# Patient Record
Sex: MALE | Race: WHITE | NOT HISPANIC OR LATINO | Employment: OTHER | URBAN - METROPOLITAN AREA
[De-identification: names, ages, dates, MRNs, and addresses within clinical notes are randomized per-mention and may not be internally consistent; named-entity substitution may affect disease eponyms.]

---

## 2017-03-12 DIAGNOSIS — J20.9 BRONCHITIS WITH BRONCHOSPASM: ICD-10-CM

## 2017-03-12 DIAGNOSIS — J44.9 CHRONIC OBSTRUCTIVE PULMONARY DISEASE, UNSPECIFIED COPD TYPE: ICD-10-CM

## 2017-03-13 RX ORDER — FLUTICASONE PROPIONATE 50 MCG
SPRAY, SUSPENSION (ML) NASAL
Refills: 0 | OUTPATIENT
Start: 2017-03-13

## 2017-12-11 ENCOUNTER — HOSPITAL ENCOUNTER (INPATIENT)
Facility: HOSPITAL | Age: 67
LOS: 6 days | Discharge: HOME | DRG: 189 | End: 2017-12-17
Attending: INTERNAL MEDICINE | Admitting: INTERNAL MEDICINE
Payer: MEDICARE

## 2017-12-11 DIAGNOSIS — J96.01 ACUTE HYPOXEMIC RESPIRATORY FAILURE: Primary | ICD-10-CM

## 2017-12-11 DIAGNOSIS — J44.1 COPD EXACERBATION: ICD-10-CM

## 2017-12-11 DIAGNOSIS — R06.00 DYSPNEA: ICD-10-CM

## 2017-12-11 PROBLEM — Z87.891 HISTORY OF PRIOR CIGARETTE SMOKING: Status: ACTIVE | Noted: 2017-12-11

## 2017-12-11 PROBLEM — Z79.52 CURRENT CHRONIC USE OF SYSTEMIC STEROIDS: Status: ACTIVE | Noted: 2017-12-11

## 2017-12-11 LAB
BILIRUB UR QL STRIP: NEGATIVE
CLARITY UR: CLEAR
COLOR UR: YELLOW
FLUAV AG SPEC QL IA: NEGATIVE
FLUBV AG SPEC QL IA: NEGATIVE
GLUCOSE UR QL STRIP: ABNORMAL
HGB UR QL STRIP: NEGATIVE
KETONES UR QL STRIP: NEGATIVE
LEUKOCYTE ESTERASE UR QL STRIP: NEGATIVE
NITRITE UR QL STRIP: NEGATIVE
PH UR STRIP: 8 [PH] (ref 5–8)
PROT UR QL STRIP: ABNORMAL
SP GR UR STRIP: <=1.005 (ref 1–1.03)
SPECIMEN SOURCE: NORMAL
URN SPEC COLLECT METH UR: ABNORMAL
UROBILINOGEN UR STRIP-ACNC: NEGATIVE EU/DL

## 2017-12-11 PROCEDURE — 94761 N-INVAS EAR/PLS OXIMETRY MLT: CPT

## 2017-12-11 PROCEDURE — 12000002 HC ACUTE/MED SURGE SEMI-PRIVATE ROOM

## 2017-12-11 PROCEDURE — 63600175 PHARM REV CODE 636 W HCPCS: Performed by: INTERNAL MEDICINE

## 2017-12-11 PROCEDURE — 36415 COLL VENOUS BLD VENIPUNCTURE: CPT

## 2017-12-11 PROCEDURE — 93005 ELECTROCARDIOGRAM TRACING: CPT

## 2017-12-11 PROCEDURE — 87040 BLOOD CULTURE FOR BACTERIA: CPT | Mod: 59

## 2017-12-11 PROCEDURE — 27000221 HC OXYGEN, UP TO 24 HOURS

## 2017-12-11 PROCEDURE — 25000003 PHARM REV CODE 250: Performed by: INTERNAL MEDICINE

## 2017-12-11 PROCEDURE — 99900035 HC TECH TIME PER 15 MIN (STAT)

## 2017-12-11 PROCEDURE — 99900026 HC AIRWAY MAINTENANCE (STAT)

## 2017-12-11 PROCEDURE — 81003 URINALYSIS AUTO W/O SCOPE: CPT

## 2017-12-11 PROCEDURE — 99223 1ST HOSP IP/OBS HIGH 75: CPT | Mod: AI,,, | Performed by: INTERNAL MEDICINE

## 2017-12-11 PROCEDURE — 87400 INFLUENZA A/B EACH AG IA: CPT | Mod: 59

## 2017-12-11 RX ORDER — IBUPROFEN 200 MG
200 CAPSULE ORAL DAILY
Status: DISCONTINUED | OUTPATIENT
Start: 2017-12-12 | End: 2017-12-12

## 2017-12-11 RX ORDER — IBUPROFEN 200 MG
16 TABLET ORAL
Status: DISCONTINUED | OUTPATIENT
Start: 2017-12-11 | End: 2017-12-17 | Stop reason: HOSPADM

## 2017-12-11 RX ORDER — IBUPROFEN 200 MG
24 TABLET ORAL
Status: DISCONTINUED | OUTPATIENT
Start: 2017-12-11 | End: 2017-12-17 | Stop reason: HOSPADM

## 2017-12-11 RX ORDER — ACETAMINOPHEN 650 MG/1
650 SUPPOSITORY RECTAL EVERY 4 HOURS PRN
Status: DISCONTINUED | OUTPATIENT
Start: 2017-12-11 | End: 2017-12-17 | Stop reason: HOSPADM

## 2017-12-11 RX ORDER — IPRATROPIUM BROMIDE AND ALBUTEROL SULFATE 2.5; .5 MG/3ML; MG/3ML
3 SOLUTION RESPIRATORY (INHALATION) EVERY 6 HOURS PRN
Status: DISCONTINUED | OUTPATIENT
Start: 2017-12-11 | End: 2017-12-14

## 2017-12-11 RX ORDER — NAPROXEN SODIUM 220 MG/1
81 TABLET, FILM COATED ORAL DAILY
Status: DISCONTINUED | OUTPATIENT
Start: 2017-12-12 | End: 2017-12-17 | Stop reason: HOSPADM

## 2017-12-11 RX ORDER — METHYLPREDNISOLONE SOD SUCC 125 MG
62.5 VIAL (EA) INJECTION EVERY 8 HOURS
Status: DISCONTINUED | OUTPATIENT
Start: 2017-12-11 | End: 2017-12-13

## 2017-12-11 RX ORDER — ROFLUMILAST 500 UG/1
500 TABLET ORAL DAILY
Status: DISCONTINUED | OUTPATIENT
Start: 2017-12-12 | End: 2017-12-12

## 2017-12-11 RX ORDER — IPRATROPIUM BROMIDE AND ALBUTEROL SULFATE 2.5; .5 MG/3ML; MG/3ML
3 SOLUTION RESPIRATORY (INHALATION) EVERY 6 HOURS PRN
Status: DISCONTINUED | OUTPATIENT
Start: 2017-12-11 | End: 2017-12-11 | Stop reason: SDUPTHER

## 2017-12-11 RX ORDER — FLUTICASONE PROPIONATE 50 MCG
1 SPRAY, SUSPENSION (ML) NASAL DAILY
Status: DISCONTINUED | OUTPATIENT
Start: 2017-12-12 | End: 2017-12-17 | Stop reason: HOSPADM

## 2017-12-11 RX ORDER — GLUCAGON 1 MG
1 KIT INJECTION
Status: DISCONTINUED | OUTPATIENT
Start: 2017-12-11 | End: 2017-12-17 | Stop reason: HOSPADM

## 2017-12-11 RX ORDER — ENOXAPARIN SODIUM 100 MG/ML
40 INJECTION SUBCUTANEOUS EVERY 24 HOURS
Status: DISCONTINUED | OUTPATIENT
Start: 2017-12-11 | End: 2017-12-17 | Stop reason: HOSPADM

## 2017-12-11 RX ORDER — SODIUM CHLORIDE 0.9 % (FLUSH) 0.9 %
5 SYRINGE (ML) INJECTION
Status: DISCONTINUED | OUTPATIENT
Start: 2017-12-11 | End: 2017-12-17 | Stop reason: HOSPADM

## 2017-12-11 RX ORDER — CODEINE PHOSPHATE AND GUAIFENESIN 10; 100 MG/5ML; MG/5ML
10 SOLUTION ORAL EVERY 6 HOURS PRN
Status: DISCONTINUED | OUTPATIENT
Start: 2017-12-11 | End: 2017-12-11 | Stop reason: CLARIF

## 2017-12-11 RX ORDER — FLUTICASONE FUROATE AND VILANTEROL 100; 25 UG/1; UG/1
1 POWDER RESPIRATORY (INHALATION) DAILY
Status: DISCONTINUED | OUTPATIENT
Start: 2017-12-12 | End: 2017-12-17 | Stop reason: HOSPADM

## 2017-12-11 RX ORDER — HYDROCODONE POLISTIREX AND CHLORPHENIRAMINE POLISTIREX 10; 8 MG/5ML; MG/5ML
5 SUSPENSION, EXTENDED RELEASE ORAL EVERY 12 HOURS PRN
Status: DISCONTINUED | OUTPATIENT
Start: 2017-12-11 | End: 2017-12-17 | Stop reason: HOSPADM

## 2017-12-11 RX ORDER — ACETAMINOPHEN 325 MG/1
650 TABLET ORAL EVERY 6 HOURS PRN
Status: DISCONTINUED | OUTPATIENT
Start: 2017-12-11 | End: 2017-12-17 | Stop reason: HOSPADM

## 2017-12-11 RX ADMIN — METHYLPREDNISOLONE SODIUM SUCCINATE 62.5 MG: 125 INJECTION, POWDER, FOR SOLUTION INTRAMUSCULAR; INTRAVENOUS at 04:12

## 2017-12-11 RX ADMIN — CEFTRIAXONE 1 G: 1 INJECTION, POWDER, FOR SOLUTION INTRAMUSCULAR; INTRAVENOUS at 04:12

## 2017-12-11 RX ADMIN — ENOXAPARIN SODIUM 40 MG: 100 INJECTION SUBCUTANEOUS at 04:12

## 2017-12-11 RX ADMIN — METHYLPREDNISOLONE SODIUM SUCCINATE 62.5 MG: 125 INJECTION, POWDER, FOR SOLUTION INTRAMUSCULAR; INTRAVENOUS at 11:12

## 2017-12-11 RX ADMIN — AZITHROMYCIN MONOHYDRATE 500 MG: 500 INJECTION, POWDER, LYOPHILIZED, FOR SOLUTION INTRAVENOUS at 05:12

## 2017-12-11 NOTE — NURSING
Patient arrived from EMS from [facility]. Patient on 3LO2 via NC, primary nurse, Kelsey at the bedside performing head to toe assessment, patient appears aaox3 and piv intact in rue. Yellow socks applied and will continue with assessment.

## 2017-12-11 NOTE — H&P
"PCP: Jory Glez MD    History & Physical    Chief Complaint: Worsening SOB    History of Present Illness:  Patient is a 67 y.o. male admitted to Hospitalist Service from [facility] Emergency Room with complaint of worsening SOB. Patient reportedly has past medical history significant for COPD and prior history of smoking. Patient reported progressive worsening in SOB for few days. Symptoms started with "cold" symptoms. Patient has been using breathing inhalers many times and nebulizer without much improvement. No sick contact or travel history. Patient denied chest pain, abdominal pain, nausea, vomiting, headache, vision changes, focal neuro-deficits, cough or fever.     Past Medical History:   Diagnosis Date    Bronchitis     COPD (chronic obstructive pulmonary disease)      Past Surgical History:   Procedure Laterality Date    HERNIA REPAIR      WRIST SURGERY       Family History   Problem Relation Age of Onset    Cancer Mother     Asthma Mother     Heart disease Father     COPD Father     Cancer Father     Cancer Brother      colon cancer    No Known Problems Brother     No Known Problems Brother      Social History   Substance Use Topics    Smoking status: Former Smoker     Packs/day: 2.00     Years: 40.00     Quit date: 3/24/2009    Smokeless tobacco: Never Used    Alcohol use Yes      Comment: 6-12 beers/night; some days none.      Review of patient's allergies indicates:  No Known Allergies  PTA Medications   Medication Sig    albuterol (ACCUNEB) 1.25 mg/3 mL Nebu Take 1.25 mg by nebulization every 6 (six) hours as needed. Rescue    ALBUTEROL SULFATE (VENTOLIN INHL) Inhale into the lungs.    aspirin 81 MG Chew Take 1 tablet (81 mg total) by mouth once daily.    calcium citrate (CALCITRATE) 200 mg (950 mg) tablet Take 1 tablet by mouth once daily.    fluticasone (FLONASE) 50 mcg/actuation nasal spray 1 spray by Each Nare route once daily.    fluticasone-vilanterol (BREO " ELLIPTA) 100-25 mcg/dose diskus inhaler Inhale 1 puff into the lungs once daily.    LEVALBUTEROL HCL (XOPENEX INHL) Inhale into the lungs.    predniSONE (DELTASONE) 5 MG tablet     roflumilast (DALIRESP) 500 mcg Tab Take 1 tablet (500 mcg total) by mouth once daily.    guaifenesin-codeine 100-10 mg/5 ml (TUSSI-ORGANIDIN NR)  mg/5 mL syrup Take 10 mLs by mouth every 6 (six) hours as needed for Cough.     Review of Systems:  Constitutional: no fever or chills  Eyes: no visual changes  Ears, nose, mouth, throat, and face: no nasal congestion or sore throat  Respiratory: see HPI  Cardiovascular: no chest pain or palpitations  Gastrointestinal: No N/V/D or abdominal pain  Genitourinary: no hematuria or dysuria  Integument/breast: no rash or pruritis  Hematologic/lymphatic: no easy bruising or lymphadenopathy  Musculoskeletal: no arthralgias or myalgias  Neurological: no seizures or tremors.  Behavioral/Psych: no auditory or visual hallucinations  Endocrine: no heat or cold intolerance     OBJECTIVE:     Vital Signs (Most Recent)  Temp: 97 °F (36.1 °C) (12/11/17 1414)  Pulse: 98 (12/11/17 1414)  Resp: 20 (12/11/17 1414)  BP: (!) 154/76 (12/11/17 1414)  SpO2: 96 % (12/11/17 1414)    Physical Exam:  General appearance: well developed, appears stated age  Head: normocephalic, atraumatic  Eyes:  conjunctivae/corneas clear. PERRL.  Nose: Nares normal. Septum midline.  Throat: lips, mucosa, and tongue normal; teeth and gums normal, no throat erythema.  Neck: supple, symmetrical, trachea midline, no JVD and thyroid not enlarged, symmetric, no tenderness/mass/nodules  Lungs:  Bilateral scattered rhonchi with prolonged expirations.  Chest wall: no tenderness  Heart: regular rate and rhythm, S1, S2 normal, no murmur, click, rub or gallop  Abdomen: soft, non-tender non-distented; bowel sounds normal; no masses,  no organomegaly  Extremities: no cyanosis, clubbing or edema.   Pulses: 2+ and symmetric  Skin: Skin color,  texture, turgor normal. No rashes or lesions.  Lymph nodes: Cervical, supraclavicular, and axillary nodes normal.  Neurologic: Normal strength and tone. No focal numbness or weakness. CNII-XII intact.      Laboratory:   CBC:   Recent Labs  Lab 12/11/17 0514 12/11/17 0710   WBC 10.30  --    RBC 4.66  --    HGB 14.5  --    HCT 44.0 36     --    MCV 94  --    MCH 31.1*  --    MCHC 33.0  --      CMP:   Recent Labs  Lab 12/11/17 0514   *   CALCIUM 8.5*   ALBUMIN 4.1   PROT 7.3      K 3.4*   CO2 28*   *   BUN 11   CREATININE 0.8   ALKPHOS 59   ALT 23   AST 26   BILITOT 0.8     Coagulation:   Recent Labs  Lab 12/11/17 0514   LABPROT 10.2   INR 1.0     Cardiac markers:   Recent Labs  Lab 12/11/17 0514   TROPONINI 0.01     Hemoglobin A1C   Date Value Ref Range Status   08/27/2015 5.6 4.5 - 6.2 % Final     Microbiology Results (last 7 days)     ** No results found for the last 168 hours. **        Diagnostic Results:  Chest X-Ray: No   abnormality identified.    CTA chest:   1. No CT evidence for central or apparent peripheral pulmonary thromboembolus.   2. No aortic dissection or aneurysm identified.     3.  Centrilobular emphysema with bronchial wall thickening.  Assessment/Plan:     Active Hospital Problems    Diagnosis  POA    *Acute hypoxemic respiratory failure [J96.01]  Yes    COPD exacerbation [J44.1]  Yes    History of prior cigarette smoking [Z87.891]  Not Applicable    Current chronic use of systemic steroids [Z79.52]  Supplemental O2 via nasal canula; titrate O2 saturation to >92%.   Start Solumedrol 62.5 mg IV q 8 hrs.   DC PO Prednisone.   Continue beta 2 agonist bronchodilator treatments.   Continue IV antibiotics - Azithromycin and Ceftriaxone.  Continue Daliresp.  Check sputum GS and Cx.   Continue routine medications as before.   Check Influenza rapid screen.  Not Applicable        VTE Risk Mitigation         Ordered     enoxaparin injection 40 mg  Daily     Route:   Subcutaneous        12/11/17 1451     Medium Risk of VTE  Once      12/11/17 1451        Sharon Costello MD  Department of Hospital Medicine   [facility]

## 2017-12-12 LAB
ANION GAP SERPL CALC-SCNC: 10 MMOL/L
BASOPHILS # BLD AUTO: 0.1 K/UL
BASOPHILS NFR BLD: 0.4 %
BUN SERPL-MCNC: 14 MG/DL
CALCIUM SERPL-MCNC: 9.1 MG/DL
CHLORIDE SERPL-SCNC: 106 MMOL/L
CO2 SERPL-SCNC: 26 MMOL/L
CREAT SERPL-MCNC: 0.8 MG/DL
DIFFERENTIAL METHOD: ABNORMAL
EOSINOPHIL # BLD AUTO: 0 K/UL
EOSINOPHIL NFR BLD: 0 %
ERYTHROCYTE [DISTWIDTH] IN BLOOD BY AUTOMATED COUNT: 12.4 %
EST. GFR  (AFRICAN AMERICAN): >60 ML/MIN/1.73 M^2
EST. GFR  (NON AFRICAN AMERICAN): >60 ML/MIN/1.73 M^2
GLUCOSE SERPL-MCNC: 149 MG/DL
HCT VFR BLD AUTO: 40.6 %
HGB BLD-MCNC: 13.8 G/DL
LYMPHOCYTES # BLD AUTO: 0.8 K/UL
LYMPHOCYTES NFR BLD: 5.2 %
MCH RBC QN AUTO: 32 PG
MCHC RBC AUTO-ENTMCNC: 34.1 G/DL
MCV RBC AUTO: 94 FL
MONOCYTES # BLD AUTO: 0.3 K/UL
MONOCYTES NFR BLD: 1.9 %
NEUTROPHILS # BLD AUTO: 13.8 K/UL
NEUTROPHILS NFR BLD: 92.5 %
PLATELET # BLD AUTO: 292 K/UL
PMV BLD AUTO: 8.5 FL
POTASSIUM SERPL-SCNC: 4.7 MMOL/L
RBC # BLD AUTO: 4.33 M/UL
SODIUM SERPL-SCNC: 142 MMOL/L
WBC # BLD AUTO: 14.9 K/UL

## 2017-12-12 PROCEDURE — 25000003 PHARM REV CODE 250: Performed by: INTERNAL MEDICINE

## 2017-12-12 PROCEDURE — 27000221 HC OXYGEN, UP TO 24 HOURS

## 2017-12-12 PROCEDURE — 80048 BASIC METABOLIC PNL TOTAL CA: CPT

## 2017-12-12 PROCEDURE — 94640 AIRWAY INHALATION TREATMENT: CPT

## 2017-12-12 PROCEDURE — 63600175 PHARM REV CODE 636 W HCPCS: Performed by: INTERNAL MEDICINE

## 2017-12-12 PROCEDURE — 87070 CULTURE OTHR SPECIMN AEROBIC: CPT

## 2017-12-12 PROCEDURE — 36415 COLL VENOUS BLD VENIPUNCTURE: CPT

## 2017-12-12 PROCEDURE — 85025 COMPLETE CBC W/AUTO DIFF WBC: CPT

## 2017-12-12 PROCEDURE — 99232 SBSQ HOSP IP/OBS MODERATE 35: CPT | Mod: ,,, | Performed by: INTERNAL MEDICINE

## 2017-12-12 PROCEDURE — 87205 SMEAR GRAM STAIN: CPT

## 2017-12-12 PROCEDURE — 99900035 HC TECH TIME PER 15 MIN (STAT)

## 2017-12-12 PROCEDURE — 12000002 HC ACUTE/MED SURGE SEMI-PRIVATE ROOM

## 2017-12-12 PROCEDURE — 94761 N-INVAS EAR/PLS OXIMETRY MLT: CPT

## 2017-12-12 PROCEDURE — 25000242 PHARM REV CODE 250 ALT 637 W/ HCPCS: Performed by: INTERNAL MEDICINE

## 2017-12-12 RX ORDER — CALCIUM CARBONATE 500(1250)
500 TABLET ORAL ONCE
Status: COMPLETED | OUTPATIENT
Start: 2017-12-12 | End: 2017-12-12

## 2017-12-12 RX ADMIN — AZITHROMYCIN MONOHYDRATE 500 MG: 500 INJECTION, POWDER, LYOPHILIZED, FOR SOLUTION INTRAVENOUS at 06:12

## 2017-12-12 RX ADMIN — HYDROCODONE POLISTIREX AND CHLORPHENIRAMINE POLISITREX 5 ML: 10; 8 SUSPENSION, EXTENDED RELEASE ORAL at 06:12

## 2017-12-12 RX ADMIN — METHYLPREDNISOLONE SODIUM SUCCINATE 62.5 MG: 125 INJECTION, POWDER, FOR SOLUTION INTRAMUSCULAR; INTRAVENOUS at 02:12

## 2017-12-12 RX ADMIN — FLUTICASONE PROPIONATE 1 SPRAY: 50 SPRAY, METERED NASAL at 09:12

## 2017-12-12 RX ADMIN — IPRATROPIUM BROMIDE AND ALBUTEROL SULFATE 3 ML: .5; 3 SOLUTION RESPIRATORY (INHALATION) at 11:12

## 2017-12-12 RX ADMIN — METHYLPREDNISOLONE SODIUM SUCCINATE 62.5 MG: 125 INJECTION, POWDER, FOR SOLUTION INTRAMUSCULAR; INTRAVENOUS at 09:12

## 2017-12-12 RX ADMIN — FLUTICASONE FUROATE AND VILANTEROL TRIFENATATE 1 PUFF: 100; 25 POWDER RESPIRATORY (INHALATION) at 08:12

## 2017-12-12 RX ADMIN — METHYLPREDNISOLONE SODIUM SUCCINATE 62.5 MG: 125 INJECTION, POWDER, FOR SOLUTION INTRAMUSCULAR; INTRAVENOUS at 06:12

## 2017-12-12 RX ADMIN — ENOXAPARIN SODIUM 40 MG: 100 INJECTION SUBCUTANEOUS at 05:12

## 2017-12-12 RX ADMIN — Medication 500 MG: at 11:12

## 2017-12-12 RX ADMIN — ASPIRIN 81 MG CHEWABLE TABLET 81 MG: 81 TABLET CHEWABLE at 09:12

## 2017-12-12 RX ADMIN — CEFTRIAXONE 1 G: 1 INJECTION, POWDER, FOR SOLUTION INTRAMUSCULAR; INTRAVENOUS at 05:12

## 2017-12-12 NOTE — PROGRESS NOTES
"Progress Note  Hospital Medicine  Patient Name:Brad Nowak  MRN:  [id number]  Patient Class: IP- Inpatient  Admit Date: 12/11/2017  Length of Stay: 1 days  Expected Discharge Date:   Attending Physician: Sharon Costello MD  Primary Care Provider:  Jory Glez MD    SUBJECTIVE:     Principal Problem: Acute hypoxemic respiratory failure  Initial history of present illness: Patient is a 67 y.o. male admitted to Hospitalist Service from [facility] Emergency Room with complaint of worsening SOB. Patient reportedly has past medical history significant for COPD and prior history of smoking. Patient reported progressive worsening in SOB for few days. Symptoms started with "cold" symptoms. Patient has been using breathing inhalers many times and nebulizer without much improvement. No sick contact or travel history. Patient denied chest pain, abdominal pain, nausea, vomiting, headache, vision changes, focal neuro-deficits, cough or fever.     PMH/PSH/SH/FH/Meds: reviewed.    Symptoms/Review of Systems: Tmax 101F. Reports significant cough, wheezing and SOB. No chest pain or headache, fever or abdominal pain.     Diet:  Adequate intake.    Activity level: Normal.    Pain:  Patient reports no pain.       OBJECTIVE:   Vital Signs (Most Recent):      Temp: 96.3 °F (35.7 °C) (12/12/17 0800)  Pulse: 76 (12/12/17 0832)  Resp: 18 (12/12/17 0832)  BP: 133/85 (12/12/17 0800)  SpO2: 98 % (12/12/17 0832)       Vital Signs Range (Last 24H):  Temp:  [96.3 °F (35.7 °C)-101 °F (38.3 °C)]   Pulse:  []   Resp:  [16-20]   BP: (133-181)/(68-85)   SpO2:  [88 %-98 %]     Weight: 74 kg (163 lb 3.2 oz)  Body mass index is 25.56 kg/m².    Intake/Output Summary (Last 24 hours) at 12/12/17 0945  Last data filed at 12/12/17 0700   Gross per 24 hour   Intake              900 ml   Output             1050 ml   Net             -150 ml     Physical Examination:  General appearance: well developed, appears stated age  Head: normocephalic, " atraumatic  Eyes:  conjunctivae/corneas clear. PERRL.  Nose: Nares normal. Septum midline.  Throat: lips, mucosa, and tongue normal; teeth and gums normal, no throat erythema.  Neck: supple, symmetrical, trachea midline, no JVD and thyroid not enlarged, symmetric, no tenderness/mass/nodules  Lungs:  Bilateral scattered rhonchi with prolonged expirations.  Chest wall: no tenderness  Heart: regular rate and rhythm, S1, S2 normal, no murmur, click, rub or gallop  Abdomen: soft, non-tender non-distented; bowel sounds normal; no masses,  no organomegaly  Extremities: no cyanosis, clubbing or edema.   Pulses: 2+ and symmetric  Skin: Skin color, texture, turgor normal. No rashes or lesions.  Lymph nodes: Cervical, supraclavicular, and axillary nodes normal.  Neurologic: Normal strength and tone. No focal numbness or weakness. CNII-XII intact.    CBC:    Recent Labs  Lab 12/11/17  0514 12/11/17  0710 12/12/17  0551   WBC 10.30  --  14.90*   RBC 4.66  --  4.33*   HGB 14.5  --  13.8*   HCT 44.0 36 40.6     --  292   MCV 94  --  94   MCH 31.1*  --  32.0*   MCHC 33.0  --  34.1   BMP    Recent Labs  Lab 12/11/17  0514 12/12/17  0551   * 149*    142   K 3.4* 4.7   * 106   CO2 28* 26   BUN 11 14   CREATININE 0.8 0.8   CALCIUM 8.5* 9.1      Diagnostic Results:  Microbiology Results (last 7 days)     Procedure Component Value Units Date/Time    Culture, Respiratory with Gram Stain [[id number]] Collected:  12/12/17 0430    Order Status:  Sent Specimen:  Respiratory from Sputum Updated:  12/12/17 0937    Blood culture (site 1) [[id number]] Collected:  12/11/17 1542    Order Status:  Completed Specimen:  Blood from Antecubital, Left  Arm Updated:  12/12/17 0915     Blood Culture, Routine No Growth to date    Narrative:       Site # 1, aerobic and anaerobic    Blood culture (site 2) [[id number]] Collected:  12/11/17 1542    Order Status:  Completed Specimen:  Blood from Antecubital, Right  Arm Updated:  12/12/17  0915     Blood Culture, Routine No Growth to date    Narrative:       Site # 2, aerobic only         Chest X-Ray: No   abnormality identified.     CTA chest:   1. No CT evidence for central or apparent peripheral pulmonary thromboembolus.   2. No aortic dissection or aneurysm identified.     3.  Centrilobular emphysema with bronchial wall thickening.    Assessment/Plan:      *Acute hypoxemic respiratory failure [J96.01]   Yes    COPD exacerbation [J44.1]   Yes    History of prior cigarette smoking [Z87.891]   Not Applicable    Current chronic use of systemic steroids [Z79.52]  Supplemental O2 via nasal canula; titrate O2 saturation to >92%.   Continue Solumedrol 62.5 mg IV q 8 hrs.    Continue beta 2 agonist bronchodilator treatments.   Continue IV antibiotics - Azithromycin and Ceftriaxone.  Check sputum GS and Cx.   Continue routine medications as before.   Check Influenza rapid screen.    Hypokalemia - corrected  Follow BMP.   Not Applicable             VTE Risk Mitigation         Ordered     enoxaparin injection 40 mg  Daily     Route:  Subcutaneous        12/11/17 1451     Medium Risk of VTE  Once      12/11/17 1451        Sharon Costello MD  Department of Hospital Medicine   [facility]

## 2017-12-12 NOTE — PLAN OF CARE
Problem: Patient Care Overview  Goal: Plan of Care Review  Outcome: Ongoing (interventions implemented as appropriate)  Patient aao x 4. Denies pain. Dyspnea on exertion. Plan of care reviewed with patient, verbalized understanding. IV antibiotics given. Lovenox VTE prophylaxis. Remained free from fall and injury. Bed in lowest position and call light within reach. Vitals stable. Will continue to monitor.

## 2017-12-12 NOTE — PLAN OF CARE
12/12/17 1152   Patient Assessment/Suction   Level of Consciousness (AVPU) alert   Expansion/Accessory Muscles/Retractions abdominal muscle use   All Lung Fields Breath Sounds diminished   Rhythm/Pattern, Respiratory shortness of breath reported   PRE-TX-O2-ETCO2   O2 Device (Oxygen Therapy) nasal cannula   $ Is the patient on Low Flow Oxygen? Yes   SpO2 96 %   Pulse 94   Resp 16   Aerosol Therapy   $ Aerosol Therapy Charges Aerosol Treatment   Respiratory Treatment Status given   SVN/Inhaler Treatment Route mask   Position During Treatment HOB at 30 degrees   Patient Tolerance good   Post-Treatment   Post-treatment Heart Rate (beats/min) 94   Post-treatment Resp Rate (breaths/min) 18   All Fields Breath Sounds unchanged   prn tx given

## 2017-12-12 NOTE — PLAN OF CARE
Problem: Patient Care Overview  Goal: Plan of Care Review  Pt from [facility].  COPD exac.  Reports SOB on exertion or ambulation.  Oxygen in use and tolerating.  Urinating with out difficulty.  Flu neg.  UA sent as ordered.  Awaiting blood cultures.  Remained free from injury.  POC discussed and pt verbalized understanding.  Tele in use.  Call light in reach.  Denies c/o pain.

## 2017-12-12 NOTE — PLAN OF CARE
Patient lives alone, reports independence, still works full time, denies POA, living will or HH> next of kin is DaughterMaria C [facility]- [phone]. PCP is Dr. Glez, pharmacy is [facility] and patient plans to return home with no needs. Patient instructed on discharge planning folder ad left folder in room.        12/12/17 1238   Discharge Assessment   Assessment Type Discharge Planning Assessment   Confirmed/corrected address and phone number on facesheet? Yes   Assessment information obtained from? Patient   Communicated expected length of stay with patient/caregiver yes   Prior to hospitilization cognitive status: Alert/Oriented   Prior to hospitalization functional status: Independent   Current cognitive status: Alert/Oriented   Current Functional Status: Independent   Lives With alone   Able to Return to Prior Arrangements yes   Is patient able to care for self after discharge? Yes   Patient's perception of discharge disposition home or selfcare   Readmission Within The Last 30 Days no previous admission in last 30 days   Patient currently being followed by outpatient case management? No   Patient currently receives any other outside agency services? No   Equipment Currently Used at Home none   Do you have any problems affording any of your prescribed medications? No   Is the patient taking medications as prescribed? yes   Does the patient have transportation home? Yes   Transportation Available family or friend will provide   Does the patient receive services at the [facility]? No   Discharge Plan A Home   Patient/Family In Agreement With Plan yes   Does the patient have transportation to healthcare appointments? Yes

## 2017-12-12 NOTE — PLAN OF CARE
Problem: Patient Care Overview  Goal: Plan of Care Review  Outcome: Ongoing (interventions implemented as appropriate)  Pt AAOx4, PIV in place.  Tele monitored and maintained throughout shift.  I/O assessed and caluclated.  No c/o of pain during shift.  Explained need for respiratory culture, no productive sputum present at this time, will attempt to collect throughout remainder of shift.  VS stable.  Pt has remained free of injuries and falls throughout shift.  Environment free of clutter, side rails up x2, and call light within reach.  Pt has verbalized understanding of plan of care.

## 2017-12-12 NOTE — PLAN OF CARE
12/11/17 2055   Patient Assessment/Suction   Level of Consciousness (AVPU) alert   Respiratory Effort Normal;Unlabored   Expansion/Accessory Muscles/Retractions expansion symmetric;no retractions;no use of accessory muscles   All Lung Fields Breath Sounds clear;diminished   PRE-TX-O2-ETCO2   O2 Device (Oxygen Therapy) nasal cannula   Flow (L/min) 2   Oxygen Concentration (%) 28   SpO2 (!) 94 %   Pulse Oximetry Type Intermittent   Pulse 101   Resp 16   Aerosol Therapy   $ Aerosol Therapy Charges PRN treatment not required   Respiratory Treatment Status PRN treatment not required   Ready to Wean/Extubation Screen   FIO2<=50 (chart decimal) 0.28

## 2017-12-12 NOTE — PLAN OF CARE
12/12/17 0832   Patient Assessment/Suction   Level of Consciousness (AVPU) alert   PRE-TX-O2-ETCO2   O2 Device (Oxygen Therapy) nasal cannula   $ Is the patient on Low Flow Oxygen? Yes   Flow (L/min) 2   Oxygen Concentration (%) 28   SpO2 98 %   Pulse Oximetry Type Intermittent   $ Pulse Oximetry - Multiple Charge Pulse Oximetry - Multiple   Pulse 76   Resp 18   Inhaler   $ Inhaler Charges MDI (Metered Dose Inahler) Treatment;Mouth rinsed post treatment   Respiratory Treatment Status given   SVN/Inhaler Treatment Route mouthpiece   Patient Tolerance good   Ready to Wean/Extubation Screen   FIO2<=50 (chart decimal) 0.28

## 2017-12-13 PROBLEM — J44.1 ACUTE EXACERBATION OF COPD WITH ASTHMA: Status: ACTIVE | Noted: 2017-12-13

## 2017-12-13 PROBLEM — R09.89 CHRONIC SINUS COMPLAINTS: Status: ACTIVE | Noted: 2017-12-13

## 2017-12-13 PROBLEM — J82.83 EOSINOPHILIC ASTHMA: Status: ACTIVE | Noted: 2017-12-13

## 2017-12-13 PROBLEM — Z77.090 ASBESTOS EXPOSURE: Status: ACTIVE | Noted: 2017-12-13

## 2017-12-13 PROBLEM — J45.901 ACUTE EXACERBATION OF COPD WITH ASTHMA: Status: ACTIVE | Noted: 2017-12-13

## 2017-12-13 LAB
ANION GAP SERPL CALC-SCNC: 10 MMOL/L
BASOPHILS # BLD AUTO: 0.1 K/UL
BASOPHILS NFR BLD: 0.3 %
BUN SERPL-MCNC: 19 MG/DL
CALCIUM SERPL-MCNC: 8.6 MG/DL
CHLORIDE SERPL-SCNC: 106 MMOL/L
CO2 SERPL-SCNC: 25 MMOL/L
CREAT SERPL-MCNC: 0.7 MG/DL
DIFFERENTIAL METHOD: ABNORMAL
EOSINOPHIL # BLD AUTO: 0 K/UL
EOSINOPHIL NFR BLD: 0 %
ERYTHROCYTE [DISTWIDTH] IN BLOOD BY AUTOMATED COUNT: 12.8 %
EST. GFR  (AFRICAN AMERICAN): >60 ML/MIN/1.73 M^2
EST. GFR  (NON AFRICAN AMERICAN): >60 ML/MIN/1.73 M^2
GLUCOSE SERPL-MCNC: 135 MG/DL
HCT VFR BLD AUTO: 41.7 %
HGB BLD-MCNC: 14 G/DL
LYMPHOCYTES # BLD AUTO: 1 K/UL
LYMPHOCYTES NFR BLD: 4.8 %
MCH RBC QN AUTO: 31.5 PG
MCHC RBC AUTO-ENTMCNC: 33.6 G/DL
MCV RBC AUTO: 94 FL
MONOCYTES # BLD AUTO: 0.6 K/UL
MONOCYTES NFR BLD: 2.6 %
NEUTROPHILS # BLD AUTO: 20 K/UL
NEUTROPHILS NFR BLD: 92.3 %
PLATELET # BLD AUTO: 291 K/UL
PMV BLD AUTO: 9.5 FL
POTASSIUM SERPL-SCNC: 4.1 MMOL/L
RBC # BLD AUTO: 4.45 M/UL
SODIUM SERPL-SCNC: 141 MMOL/L
WBC # BLD AUTO: 21.7 K/UL

## 2017-12-13 PROCEDURE — 94761 N-INVAS EAR/PLS OXIMETRY MLT: CPT

## 2017-12-13 PROCEDURE — 12000002 HC ACUTE/MED SURGE SEMI-PRIVATE ROOM

## 2017-12-13 PROCEDURE — 25000242 PHARM REV CODE 250 ALT 637 W/ HCPCS: Performed by: INTERNAL MEDICINE

## 2017-12-13 PROCEDURE — 99223 1ST HOSP IP/OBS HIGH 75: CPT | Mod: ,,, | Performed by: INTERNAL MEDICINE

## 2017-12-13 PROCEDURE — 80048 BASIC METABOLIC PNL TOTAL CA: CPT

## 2017-12-13 PROCEDURE — 94640 AIRWAY INHALATION TREATMENT: CPT

## 2017-12-13 PROCEDURE — 25000003 PHARM REV CODE 250: Performed by: INTERNAL MEDICINE

## 2017-12-13 PROCEDURE — 36415 COLL VENOUS BLD VENIPUNCTURE: CPT

## 2017-12-13 PROCEDURE — 85025 COMPLETE CBC W/AUTO DIFF WBC: CPT

## 2017-12-13 PROCEDURE — 99900035 HC TECH TIME PER 15 MIN (STAT)

## 2017-12-13 PROCEDURE — 99232 SBSQ HOSP IP/OBS MODERATE 35: CPT | Mod: ,,, | Performed by: INTERNAL MEDICINE

## 2017-12-13 PROCEDURE — 63600175 PHARM REV CODE 636 W HCPCS: Performed by: INTERNAL MEDICINE

## 2017-12-13 PROCEDURE — 27000221 HC OXYGEN, UP TO 24 HOURS

## 2017-12-13 RX ORDER — FLUTICASONE FUROATE AND VILANTEROL 200; 25 UG/1; UG/1
1 POWDER RESPIRATORY (INHALATION) DAILY
Qty: 1 EACH | Refills: 11 | Status: SHIPPED | OUTPATIENT
Start: 2017-12-13 | End: 2018-07-05

## 2017-12-13 RX ORDER — MONTELUKAST SODIUM 10 MG/1
10 TABLET ORAL NIGHTLY
Qty: 30 TABLET | Refills: 11 | Status: SHIPPED | OUTPATIENT
Start: 2017-12-13 | End: 2018-09-20 | Stop reason: SDUPTHER

## 2017-12-13 RX ORDER — METHYLPREDNISOLONE SOD SUCC 125 MG
62.5 VIAL (EA) INJECTION EVERY 12 HOURS
Status: DISCONTINUED | OUTPATIENT
Start: 2017-12-13 | End: 2017-12-16

## 2017-12-13 RX ORDER — PREDNISONE 20 MG/1
TABLET ORAL
Qty: 42 TABLET | Refills: 1 | Status: SHIPPED | OUTPATIENT
Start: 2017-12-13 | End: 2017-12-28 | Stop reason: SDUPTHER

## 2017-12-13 RX ADMIN — ENOXAPARIN SODIUM 40 MG: 100 INJECTION SUBCUTANEOUS at 05:12

## 2017-12-13 RX ADMIN — IPRATROPIUM BROMIDE AND ALBUTEROL SULFATE 3 ML: .5; 3 SOLUTION RESPIRATORY (INHALATION) at 05:12

## 2017-12-13 RX ADMIN — FLUTICASONE PROPIONATE 1 SPRAY: 50 SPRAY, METERED NASAL at 08:12

## 2017-12-13 RX ADMIN — METHYLPREDNISOLONE SODIUM SUCCINATE 62.5 MG: 125 INJECTION, POWDER, FOR SOLUTION INTRAMUSCULAR; INTRAVENOUS at 09:12

## 2017-12-13 RX ADMIN — METHYLPREDNISOLONE SODIUM SUCCINATE 62.5 MG: 125 INJECTION, POWDER, FOR SOLUTION INTRAMUSCULAR; INTRAVENOUS at 05:12

## 2017-12-13 RX ADMIN — HYDROCODONE POLISTIREX AND CHLORPHENIRAMINE POLISITREX 5 ML: 10; 8 SUSPENSION, EXTENDED RELEASE ORAL at 12:12

## 2017-12-13 RX ADMIN — CEFTRIAXONE 1 G: 1 INJECTION, POWDER, FOR SOLUTION INTRAMUSCULAR; INTRAVENOUS at 05:12

## 2017-12-13 RX ADMIN — ASPIRIN 81 MG CHEWABLE TABLET 81 MG: 81 TABLET CHEWABLE at 08:12

## 2017-12-13 RX ADMIN — FLUTICASONE FUROATE AND VILANTEROL TRIFENATATE 1 PUFF: 100; 25 POWDER RESPIRATORY (INHALATION) at 08:12

## 2017-12-13 RX ADMIN — IPRATROPIUM BROMIDE AND ALBUTEROL SULFATE 3 ML: .5; 3 SOLUTION RESPIRATORY (INHALATION) at 11:12

## 2017-12-13 RX ADMIN — AZITHROMYCIN MONOHYDRATE 500 MG: 500 INJECTION, POWDER, LYOPHILIZED, FOR SOLUTION INTRAVENOUS at 06:12

## 2017-12-13 NOTE — PROGRESS NOTES
"Progress Note  Hospital Medicine  Patient Name:Brad Nowak  MRN:  [id number]  Patient Class: IP- Inpatient  Admit Date: 12/11/2017  Length of Stay: 2 days  Expected Discharge Date:   Attending Physician: Sharon Costello MD  Primary Care Provider:  Jory Glez MD    SUBJECTIVE:     Principal Problem: Acute hypoxemic respiratory failure  Initial history of present illness: Patient is a 67 y.o. male admitted to Hospitalist Service from [facility] Emergency Room with complaint of worsening SOB. Patient reportedly has past medical history significant for COPD and prior history of smoking. Patient reported progressive worsening in SOB for few days. Symptoms started with "cold" symptoms. Patient has been using breathing inhalers many times and nebulizer without much improvement. No sick contact or travel history. Patient denied chest pain, abdominal pain, nausea, vomiting, headache, vision changes, focal neuro-deficits, cough or fever.     PMH/PSH/SH/FH/Meds: reviewed.    Symptoms/Review of Systems: Fever better. Patient not feeling any better. Still with SOB and wheezing. No chest pain or headache, fever or abdominal pain.     Diet:  Adequate intake.    Activity level: Normal.    Pain:  Patient reports no pain.       OBJECTIVE:   Vital Signs (Most Recent):      Temp: 97.7 °F (36.5 °C) (12/13/17 0746)  Pulse: 75 (12/13/17 0814)  Resp: 18 (12/13/17 0814)  BP: 138/67 (12/13/17 0746)  SpO2: (!) 94 % (12/13/17 0814)       Vital Signs Range (Last 24H):  Temp:  [96.1 °F (35.6 °C)-97.7 °F (36.5 °C)]   Pulse:  []   Resp:  [14-18]   BP: (136-148)/(67-79)   SpO2:  [93 %-97 %]     Weight: 77 kg (169 lb 12.8 oz)  Body mass index is 26.59 kg/m².    Intake/Output Summary (Last 24 hours) at 12/13/17 0950  Last data filed at 12/13/17 0800   Gross per 24 hour   Intake             1615 ml   Output              675 ml   Net              940 ml     Physical Examination:  General appearance: well developed, appears stated " age  Head: normocephalic, atraumatic  Eyes:  conjunctivae/corneas clear. PERRL.  Nose: Nares normal. Septum midline.  Throat: lips, mucosa, and tongue normal; teeth and gums normal, no throat erythema.  Neck: supple, symmetrical, trachea midline, no JVD and thyroid not enlarged, symmetric, no tenderness/mass/nodules  Lungs:  Bilateral scattered rhonchi with prolonged expirations.  Chest wall: no tenderness  Heart: regular rate and rhythm, S1, S2 normal, no murmur, click, rub or gallop  Abdomen: soft, non-tender non-distented; bowel sounds normal; no masses,  no organomegaly  Extremities: no cyanosis, clubbing or edema.   Pulses: 2+ and symmetric  Skin: Skin color, texture, turgor normal. No rashes or lesions.  Lymph nodes: Cervical, supraclavicular, and axillary nodes normal.  Neurologic: Normal strength and tone. No focal numbness or weakness. CNII-XII intact.    CBC:    Recent Labs  Lab 12/11/17  0514 12/11/17  0710 12/12/17  0551 12/13/17  0541   WBC 10.30  --  14.90* 21.70*   RBC 4.66  --  4.33* 4.45*   HGB 14.5  --  13.8* 14.0   HCT 44.0 36 40.6 41.7     --  292 291   MCV 94  --  94 94   MCH 31.1*  --  32.0* 31.5*   MCHC 33.0  --  34.1 33.6   BMP    Recent Labs  Lab 12/11/17  0514 12/12/17  0551 12/13/17  0541   * 149* 135*    142 141   K 3.4* 4.7 4.1   * 106 106   CO2 28* 26 25   BUN 11 14 19   CREATININE 0.8 0.8 0.7   CALCIUM 8.5* 9.1 8.6*      Diagnostic Results:  Microbiology Results (last 7 days)     Procedure Component Value Units Date/Time    Culture, Respiratory with Gram Stain [[id number]] Collected:  12/12/17 0430    Order Status:  Completed Specimen:  Respiratory from Sputum Updated:  12/13/17 0801     Respiratory Culture Normal respiratory aakash     Gram Stain (Respiratory) <10 epithelial cells per low power field.     Gram Stain (Respiratory) Rare WBC's     Gram Stain (Respiratory) Rare Gram positive rods    Blood culture (site 1) [[id number]] Collected:  12/11/17 1225     Order Status:  Completed Specimen:  Blood from Antecubital, Left  Arm Updated:  12/13/17 0612     Blood Culture, Routine No Growth to date     Blood Culture, Routine No Growth to date    Narrative:       Site # 1, aerobic and anaerobic    Blood culture (site 2) [[id number]] Collected:  12/11/17 1542    Order Status:  Completed Specimen:  Blood from Antecubital, Right  Arm Updated:  12/13/17 0612     Blood Culture, Routine No Growth to date     Blood Culture, Routine No Growth to date    Narrative:       Site # 2, aerobic only         Chest X-Ray: No   abnormality identified.     CTA chest:   1. No CT evidence for central or apparent peripheral pulmonary thromboembolus.   2. No aortic dissection or aneurysm identified.     3.  Centrilobular emphysema with bronchial wall thickening.    Assessment/Plan:      *Acute hypoxemic respiratory failure [J96.01]   Yes    COPD exacerbation [J44.1]   Yes    History of prior cigarette smoking [Z87.891]   Not Applicable    Current chronic use of systemic steroids [Z79.52]  Supplemental O2 via nasal canula; titrate O2 saturation to >92%.   Reduce Solumedrol 62.5 mg IV q 12 hrs.  Consult Dr. Eddy from pulmonary medicine.  Continue beta 2 agonist bronchodilator treatments.   Continue IV antibiotics - Azithromycin and Ceftriaxone.  Check sputum GS and Cx.   Continue routine medications as before.   Influenza rapid screen - negative.    Hypokalemia - corrected  Follow BMP.   Not Applicable             VTE Risk Mitigation         Ordered     enoxaparin injection 40 mg  Daily     Route:  Subcutaneous        12/11/17 1451     Medium Risk of VTE  Once      12/11/17 1451        Sharon Costello MD  Department of Hospital Medicine   [facility]

## 2017-12-13 NOTE — PLAN OF CARE
12/13/17 0814   PRE-TX-O2-ETCO2   O2 Device (Oxygen Therapy) nasal cannula   $ Is the patient on Low Flow Oxygen? Yes   Flow (L/min) 3   Oxygen Concentration (%) 32   SpO2 (!) 94 %   Pulse Oximetry Type Intermittent   $ Pulse Oximetry - Multiple Charge Pulse Oximetry - Multiple   Pulse 75   Resp 18   Inhaler   $ Inhaler Charges MDI (Metered Dose Inahler) Treatment   Respiratory Treatment Status given;mouth rinsed post treatment   SVN/Inhaler Treatment Route mouthpiece   Patient Tolerance good   Ready to Wean/Extubation Screen   FIO2<=50 (chart decimal) 0.32

## 2017-12-13 NOTE — PLAN OF CARE
12/12/17 2040   Patient Assessment/Suction   Level of Consciousness (AVPU) alert   All Lung Fields Breath Sounds diminished   PRE-TX-O2-ETCO2   O2 Device (Oxygen Therapy) nasal cannula   Flow (L/min) 3   Oxygen Concentration (%) 32   SpO2 97 %   Pulse Oximetry Type Intermittent   Pulse 87   Resp 16   Aerosol Therapy   $ Aerosol Therapy Charges PRN treatment not required   Respiratory Treatment Status PRN treatment not required   Ready to Wean/Extubation Screen   FIO2<=50 (chart decimal) 0.32

## 2017-12-13 NOTE — PLAN OF CARE
Problem: Patient Care Overview  Goal: Plan of Care Review  Outcome: Ongoing (interventions implemented as appropriate)  Patient AAOx4. POC reviewed with patient. Verbalized understanding. 02 at 3/l min Nc. No complaints of SOB. Tele monitor intact.  Patient denies pain during shift.  Patient tolerating a regular diet well. No complaints of N/V.  Patient up to the bathroom with stand by assistance. Hourly rounding on patient to promote safety. Safety maintained throughout the shift. Patient positions and repositions self independently. No Skin break down.   Bed locked and in lowest position. Call light in reach. Side rails up x1; patient refuses 2 side rails up. NON skid socks on when OOB. Patient remained free of falls/ trauma.  Will continue to monitor.

## 2017-12-13 NOTE — PLAN OF CARE
Prn tx given     12/13/17 1106   Patient Assessment/Suction   Level of Consciousness (AVPU) alert   All Lung Fields Breath Sounds crackles fine   PRE-TX-O2-ETCO2   O2 Device (Oxygen Therapy) nasal cannula   $ Is the patient on Low Flow Oxygen? Yes   Flow (L/min) 3   Oxygen Concentration (%) 32   SpO2 96 %   Pulse Oximetry Type Intermittent   $ Pulse Oximetry - Multiple Charge Pulse Oximetry - Multiple   Pulse 78   Resp 18   Aerosol Therapy   $ Aerosol Therapy Charges Aerosol Treatment   Respiratory Treatment Status given   SVN/Inhaler Treatment Route mask   Position During Treatment HOB at 30 degrees   Patient Tolerance good   Post-Treatment   Post-treatment Heart Rate (beats/min) 81   Post-treatment Resp Rate (breaths/min) 18   All Fields Breath Sounds aeration increased   Ready to Wean/Extubation Screen   FIO2<=50 (chart decimal) 0.32

## 2017-12-13 NOTE — CONSULTS
12/13/2017      Admit Date: 12/11/2017  Brad Nowak  New Patient Consult    No chief complaint on file.      History of Present Illness:  Cc is sob x 3 days.    Pt worked refinery as .  Stopped smoking yrs ago. FH + asthma with no prior h/o asthma.  Has had cough and wheezes and sob going back prior to [place].  Pt noted needed freq steroids so was kept on prednisone 5 mg daily with need to increase dose ever 2-3 months because of increase sob.  Pt uses breo 100 and xopenex works better than ventolin.  Pt has chr nasal drainage but no bad infections.  abx not used.  He denies spiriva helped in past and denies using daliresp.     Pt rides bike to work - bike weighs 300 lbs with tools for trade.  Pt very active and still works - lives with daughter.        PFSH:  Past Medical History:   Diagnosis Date    Bronchitis     COPD (chronic obstructive pulmonary disease)      Past Surgical History:   Procedure Laterality Date    HERNIA REPAIR      WRIST SURGERY       Social History   Substance Use Topics    Smoking status: Former Smoker     Packs/day: 2.00     Years: 40.00     Quit date: 3/24/2009    Smokeless tobacco: Never Used    Alcohol use Yes      Comment: 6-12 beers/night; some days none.     Family History   Problem Relation Age of Onset    Cancer Mother     Asthma Mother     Heart disease Father     COPD Father     Cancer Father     Cancer Brother      colon cancer    No Known Problems Brother     No Known Problems Brother      Review of patient's allergies indicates:  No Known Allergies    Performance Status:Performance Status:The patient's activity level is no limits with regular activity.    Review of Systems:  a review of eleven systems covering constitutional, Psych, Eye, HEENT, Respiratory, Cardiac, GI, , Musculoskeletal, Endocrine, Dermatologicwas negative except the above mentioned abnormalities and for any pertinent findings as listed below: as above, rest good.   "      Exam:Comprehensive exam done. BP (!) 152/71   Pulse 86   Temp 97.5 °F (36.4 °C)   Resp 20   Ht 5' 7" (1.702 m)   Wt 77 kg (169 lb 12.8 oz)   SpO2 95%   BMI 26.59 kg/m²   Exam included Vitals as listed, and patient's appearance and affect and alertness and mood, oral exam for yeast and hygiene and pharynx lesions and Mallapatti (M) score, neck with inspection for jvd and masses and thyroid abnormalities and lymph nodes (supraclavicular and infraclavicular nodes also examined and noted if abn), chest exam included symmetry and effort and fremitus and percussion and auscultation, cardiac exam included rhythm and gallops and murmur and rubs and jvd and edema, abdominal exam for mass and hepatosplenomegaly and tenderness and hernias and bowel sounds, Musculoskeletal exam with muscle tone and posture and mobility/gait and  strenght, and skin for rashes and cyanosis and pallor and turgor, extremity for clubbing.  Findings were normal except as listed below:  M1, sl obese, no neck abn, no nodes neck/arm, no distress, chest symmetric, nl fremitus/percussion, good bs, RRR with no murmur or gallop or rub. No hs megaly nor mass, no clubbing nor deformity.  Good affect, moves all 4, euthymic.nares and pharynx good with few teeth.    Radiographs reviewed: view by direct vision  Diffused emphysema seen on cxr.  Results for orders placed during the hospital encounter of 08/26/14   X-Ray Chest 1 View    Narrative COMPARISON: Chest radiograph and CT thorax 8/10/14    FINDINGS: AP portable view of the chest. Monitoring leads and tubing overlie the chest.  No detrimental change.  The lungs are symmetrically hyperinflated with increased lucency of the upper zones suggesting sequela of underlying COPD.  No large   consolidation or new focal opacity.  No large pleural effusion or pneumothorax.  Cardiomediastinal silhouette is within normal limits.  No acute osseous process seen.    Impression No detrimental change or " radiographic acute intrathoracic process seen on this single view.      Electronically signed by: NICOLE JAMES MD, MD  Date:     08/26/14  Time:    23:04    ]    Labs       Recent Labs  Lab 12/13/17 0541   WBC 21.70*   HGB 14.0   HCT 41.7          Recent Labs  Lab 12/13/17 0541      K 4.1      CO2 25   BUN 19   CREATININE 0.7   *   CALCIUM 8.6*   No results for input(s): PH, PCO2, PO2, HCO3 in the last 24 hours.  Microbiology Results (last 7 days)     Procedure Component Value Units Date/Time    Culture, Respiratory with Gram Stain [[id number]] Collected:  12/12/17 0430    Order Status:  Completed Specimen:  Respiratory from Sputum Updated:  12/13/17 0801     Respiratory Culture Normal respiratory aakash     Gram Stain (Respiratory) <10 epithelial cells per low power field.     Gram Stain (Respiratory) Rare WBC's     Gram Stain (Respiratory) Rare Gram positive rods    Blood culture (site 1) [[id number]] Collected:  12/11/17 1542    Order Status:  Completed Specimen:  Blood from Antecubital, Left  Arm Updated:  12/13/17 0612     Blood Culture, Routine No Growth to date     Blood Culture, Routine No Growth to date    Narrative:       Site # 1, aerobic and anaerobic    Blood culture (site 2) [[id number]] Collected:  12/11/17 1542    Order Status:  Completed Specimen:  Blood from Antecubital, Right  Arm Updated:  12/13/17 0612     Blood Culture, Routine No Growth to date     Blood Culture, Routine No Growth to date    Narrative:       Site # 2, aerobic only        Results for MADELEINE EVANS (MRN [id number]) as of 12/13/2017 16:21   Ref. Range 8/26/2014 22:00   WBC Latest Ref Range: 3.90 - 12.70 K/uL 8.34   RBC Latest Ref Range: 4.60 - 6.20 M/uL 4.51 (L)   Hemoglobin Latest Ref Range: 14.0 - 18.0 g/dL 13.9 (L)   Hematocrit Latest Ref Range: 40.0 - 54.0 % 42.5   MCV Latest Ref Range: 82 - 98 fL 94   MCH Latest Ref Range: 27.0 - 31.0 pg 30.8   MCHC Latest Ref Range: 32.0 - 36.0 % 32.7   RDW Latest Ref  Range: 11.5 - 14.5 % 12.7   Platelets Latest Ref Range: 150 - 350 K/uL 272   MPV Latest Ref Range: 9.2 - 12.9 fL 10.8   Gran% Latest Ref Range: 38.0 - 73.0 % 54.6   Gran # Latest Ref Range: 1.8 - 7.7 K/uL 4.6   Lymph% Latest Ref Range: 18.0 - 48.0 % 22.5   Lymph # Latest Ref Range: 1.0 - 4.8 K/uL 1.9   Mono% Latest Ref Range: 4.0 - 15.0 % 9.5   Mono # Latest Ref Range: 0.3 - 1.0 K/uL 0.8   Eosinophil% Latest Ref Range: 0.0 - 8.0 % 12.2 (H)   Eos # Latest Ref Range: 0.0 - 0.5 K/uL 1.0 (H)   Basophil% Latest Ref Range: 0.0 - 1.9 % 0.8   Baso # Latest Ref Range: 0.00 - 0.20 K/uL 0.07     Impression:  Active Hospital Problems    Diagnosis  POA    *Acute hypoxemic respiratory failure [J96.01]  Yes    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               Plan:   Pt has eosinophillic asthma and copd.  Pt should respond to singulair.  Higher dose steroid action plan needed.  He would likely do very well with il5 rx.      outpt soon on prednisone 60/d x 7 with taper, going to breo 200, and singulair would be good with current rx.  Would not strop prednisone.  outpt f/u recommended. I do no see asbestos complications.

## 2017-12-14 LAB
ANION GAP SERPL CALC-SCNC: 6 MMOL/L
BACTERIA SPEC AEROBE CULT: NORMAL
BASOPHILS # BLD AUTO: 0 K/UL
BASOPHILS NFR BLD: 0.3 %
BUN SERPL-MCNC: 17 MG/DL
CALCIUM SERPL-MCNC: 8.3 MG/DL
CHLORIDE SERPL-SCNC: 105 MMOL/L
CO2 SERPL-SCNC: 29 MMOL/L
CREAT SERPL-MCNC: 0.7 MG/DL
DIFFERENTIAL METHOD: ABNORMAL
EOSINOPHIL # BLD AUTO: 0 K/UL
EOSINOPHIL NFR BLD: 0 %
ERYTHROCYTE [DISTWIDTH] IN BLOOD BY AUTOMATED COUNT: 12.7 %
EST. GFR  (AFRICAN AMERICAN): >60 ML/MIN/1.73 M^2
EST. GFR  (NON AFRICAN AMERICAN): >60 ML/MIN/1.73 M^2
GLUCOSE SERPL-MCNC: 133 MG/DL
GRAM STN SPEC: NORMAL
HCT VFR BLD AUTO: 39.7 %
HGB BLD-MCNC: 13.3 G/DL
LYMPHOCYTES # BLD AUTO: 0.6 K/UL
LYMPHOCYTES NFR BLD: 4.1 %
MCH RBC QN AUTO: 31.8 PG
MCHC RBC AUTO-ENTMCNC: 33.6 G/DL
MCV RBC AUTO: 95 FL
MONOCYTES # BLD AUTO: 0.3 K/UL
MONOCYTES NFR BLD: 1.8 %
NEUTROPHILS # BLD AUTO: 13.6 K/UL
NEUTROPHILS NFR BLD: 93.8 %
PLATELET # BLD AUTO: 292 K/UL
PMV BLD AUTO: 8.5 FL
POTASSIUM SERPL-SCNC: 5.3 MMOL/L
RBC # BLD AUTO: 4.2 M/UL
SODIUM SERPL-SCNC: 140 MMOL/L
WBC # BLD AUTO: 14.5 K/UL

## 2017-12-14 PROCEDURE — 25000242 PHARM REV CODE 250 ALT 637 W/ HCPCS: Performed by: INTERNAL MEDICINE

## 2017-12-14 PROCEDURE — 99232 SBSQ HOSP IP/OBS MODERATE 35: CPT | Mod: ,,, | Performed by: INTERNAL MEDICINE

## 2017-12-14 PROCEDURE — 36415 COLL VENOUS BLD VENIPUNCTURE: CPT

## 2017-12-14 PROCEDURE — 94640 AIRWAY INHALATION TREATMENT: CPT

## 2017-12-14 PROCEDURE — 85025 COMPLETE CBC W/AUTO DIFF WBC: CPT

## 2017-12-14 PROCEDURE — 25000003 PHARM REV CODE 250: Performed by: INTERNAL MEDICINE

## 2017-12-14 PROCEDURE — 80048 BASIC METABOLIC PNL TOTAL CA: CPT

## 2017-12-14 PROCEDURE — 27000221 HC OXYGEN, UP TO 24 HOURS

## 2017-12-14 PROCEDURE — 12000002 HC ACUTE/MED SURGE SEMI-PRIVATE ROOM

## 2017-12-14 PROCEDURE — 94761 N-INVAS EAR/PLS OXIMETRY MLT: CPT

## 2017-12-14 PROCEDURE — 63600175 PHARM REV CODE 636 W HCPCS: Performed by: INTERNAL MEDICINE

## 2017-12-14 RX ORDER — AZITHROMYCIN 250 MG/1
500 TABLET, FILM COATED ORAL DAILY
Status: DISCONTINUED | OUTPATIENT
Start: 2017-12-15 | End: 2017-12-17 | Stop reason: HOSPADM

## 2017-12-14 RX ORDER — IPRATROPIUM BROMIDE AND ALBUTEROL SULFATE 2.5; .5 MG/3ML; MG/3ML
3 SOLUTION RESPIRATORY (INHALATION) EVERY 4 HOURS
Status: DISCONTINUED | OUTPATIENT
Start: 2017-12-14 | End: 2017-12-16

## 2017-12-14 RX ADMIN — ASPIRIN 81 MG CHEWABLE TABLET 81 MG: 81 TABLET CHEWABLE at 08:12

## 2017-12-14 RX ADMIN — CEFTRIAXONE 1 G: 1 INJECTION, POWDER, FOR SOLUTION INTRAMUSCULAR; INTRAVENOUS at 03:12

## 2017-12-14 RX ADMIN — ENOXAPARIN SODIUM 40 MG: 100 INJECTION SUBCUTANEOUS at 06:12

## 2017-12-14 RX ADMIN — METHYLPREDNISOLONE SODIUM SUCCINATE 62.5 MG: 125 INJECTION, POWDER, FOR SOLUTION INTRAMUSCULAR; INTRAVENOUS at 08:12

## 2017-12-14 RX ADMIN — METHYLPREDNISOLONE SODIUM SUCCINATE 62.5 MG: 125 INJECTION, POWDER, FOR SOLUTION INTRAMUSCULAR; INTRAVENOUS at 10:12

## 2017-12-14 RX ADMIN — HYDROCODONE POLISTIREX AND CHLORPHENIRAMINE POLISITREX 5 ML: 10; 8 SUSPENSION, EXTENDED RELEASE ORAL at 01:12

## 2017-12-14 RX ADMIN — FLUTICASONE FUROATE AND VILANTEROL TRIFENATATE 1 PUFF: 100; 25 POWDER RESPIRATORY (INHALATION) at 08:12

## 2017-12-14 RX ADMIN — AZITHROMYCIN MONOHYDRATE 500 MG: 500 INJECTION, POWDER, LYOPHILIZED, FOR SOLUTION INTRAVENOUS at 06:12

## 2017-12-14 RX ADMIN — IPRATROPIUM BROMIDE AND ALBUTEROL SULFATE 3 ML: .5; 3 SOLUTION RESPIRATORY (INHALATION) at 08:12

## 2017-12-14 RX ADMIN — FLUTICASONE PROPIONATE 1 SPRAY: 50 SPRAY, METERED NASAL at 08:12

## 2017-12-14 NOTE — PLAN OF CARE
Problem: Patient Care Overview  Goal: Plan of Care Review  Outcome: Ongoing (interventions implemented as appropriate)  Patient aao x 4. Denies pain. Plan of care reviewed with patient, verbalized understanding. IV antibiotics given. Lovenox as VTE prophylaxis. Pulmonology consult today.  Respiratory treatments. O2 intact. Patient ambulates to restroom independently. Remained free from fall and injury. Bed in lowest position and call light within reach.

## 2017-12-14 NOTE — PLAN OF CARE
Problem: Patient Care Overview  Goal: Plan of Care Review  Outcome: Outcome(s) achieved Date Met: 12/14/17  Patient AAOx4. POC reviewed with patient. Verbalized understanding. IV antibiotics.  02 at 2/l min nasal canula. No complaints of SOB during shift. Tele monitor intact.  Patient denies pain during shift.  Patient tolerating a regular diet well. No complaints of N/V.  Patient up to the bathroom with stand by assistance. Hourly rounding on patient to promote safety. Safety maintained throughout the shift. Patient positions and repositions self independently. No Skin break down during shift.   Bed locked and in lowest position. Call light in reach. Side rails up x1; patient refuses 2 side rails up. NON skid socks on when OOB. Patient remained free of falls/ trauma.  Will continue to monitor.

## 2017-12-14 NOTE — PROGRESS NOTES
"Progress Note  Hospital Medicine  Patient Name:Brad Nowak  MRN:  [id number]  Patient Class: IP- Inpatient  Admit Date: 12/11/2017  Length of Stay: 3 days  Expected Discharge Date:   Attending Physician: Sharon Costello MD  Primary Care Provider:  Jory Glez MD    SUBJECTIVE:     Principal Problem: Acute hypoxemic respiratory failure  Initial history of present illness: Patient is a 67 y.o. male admitted to Hospitalist Service from [facility] Emergency Room with complaint of worsening SOB. Patient reportedly has past medical history significant for COPD and prior history of smoking. Patient reported progressive worsening in SOB for few days. Symptoms started with "cold" symptoms. Patient has been using breathing inhalers many times and nebulizer without much improvement. No sick contact or travel history. Patient denied chest pain, abdominal pain, nausea, vomiting, headache, vision changes, focal neuro-deficits, cough or fever.     PMH/PSH/SH/FH/Meds: reviewed.    Symptoms/Review of Systems: Fever better. Lungs sound better but patient not feeling any better. Still with SOB and SHOEMAKER. No chest pain or headache, fever or abdominal pain.     Diet:  Adequate intake.    Activity level: Normal.    Pain:  Patient reports no pain.       OBJECTIVE:   Vital Signs (Most Recent):      Temp: 98.1 °F (36.7 °C) (12/14/17 0831)  Pulse: 77 (12/14/17 0831)  Resp: 18 (12/14/17 0831)  BP: (!) 141/65 (12/14/17 0831)  SpO2: (!) 94 % (12/14/17 0831)       Vital Signs Range (Last 24H):  Temp:  [97.5 °F (36.4 °C)-98.6 °F (37 °C)]   Pulse:  [60-92]   Resp:  [18-20]   BP: (130-156)/(63-72)   SpO2:  [94 %-98 %]     Weight: 74.1 kg (163 lb 4.8 oz)  Body mass index is 25.58 kg/m².    Intake/Output Summary (Last 24 hours) at 12/14/17 0913  Last data filed at 12/14/17 0512   Gross per 24 hour   Intake             1100 ml   Output              550 ml   Net              550 ml     Physical Examination:  General appearance: well " developed, appears stated age  Head: normocephalic, atraumatic  Eyes:  conjunctivae/corneas clear. PERRL.  Nose: Nares normal. Septum midline.  Throat: lips, mucosa, and tongue normal; teeth and gums normal, no throat erythema.  Neck: supple, symmetrical, trachea midline, no JVD and thyroid not enlarged, symmetric, no tenderness/mass/nodules  Lungs:  Improved air movement B/L, no significant wheezing  Chest wall: no tenderness  Heart: regular rate and rhythm, S1, S2 normal, no murmur, click, rub or gallop  Abdomen: soft, non-tender non-distented; bowel sounds normal; no masses,  no organomegaly  Extremities: no cyanosis, clubbing or edema.   Pulses: 2+ and symmetric  Skin: Skin color, texture, turgor normal. No rashes or lesions.  Lymph nodes: Cervical, supraclavicular, and axillary nodes normal.  Neurologic: Normal strength and tone. No focal numbness or weakness. CNII-XII intact.    CBC:    Recent Labs  Lab 12/12/17  0551 12/13/17  0541 12/14/17  0528   WBC 14.90* 21.70* 14.50*   RBC 4.33* 4.45* 4.20*   HGB 13.8* 14.0 13.3*   HCT 40.6 41.7 39.7*    291 292   MCV 94 94 95   MCH 32.0* 31.5* 31.8*   MCHC 34.1 33.6 33.6   BMP    Recent Labs  Lab 12/12/17  0551 12/13/17  0541 12/14/17  0528   * 135* 133*    141 140   K 4.7 4.1 5.3*    106 105   CO2 26 25 29   BUN 14 19 17   CREATININE 0.8 0.7 0.7   CALCIUM 9.1 8.6* 8.3*      Diagnostic Results:  Microbiology Results (last 7 days)     Procedure Component Value Units Date/Time    Blood culture (site 2) [[id number]] Collected:  12/11/17 1542    Order Status:  Completed Specimen:  Blood from Antecubital, Right  Arm Updated:  12/14/17 0612     Blood Culture, Routine No Growth to date     Blood Culture, Routine No Growth to date     Blood Culture, Routine No Growth to date    Narrative:       Site # 2, aerobic only    Blood culture (site 1) [[id number]] Collected:  12/11/17 1542    Order Status:  Completed Specimen:  Blood from Antecubital, Left  Arm  Updated:  12/14/17 0612     Blood Culture, Routine No Growth to date     Blood Culture, Routine No Growth to date     Blood Culture, Routine No Growth to date    Narrative:       Site # 1, aerobic and anaerobic    Culture, Respiratory with Gram Stain [[id number]] Collected:  12/12/17 0430    Order Status:  Completed Specimen:  Respiratory from Sputum Updated:  12/13/17 0801     Respiratory Culture Normal respiratory aakash     Gram Stain (Respiratory) <10 epithelial cells per low power field.     Gram Stain (Respiratory) Rare WBC's     Gram Stain (Respiratory) Rare Gram positive rods         Chest X-Ray: No   abnormality identified.     CTA chest:   1. No CT evidence for central or apparent peripheral pulmonary thromboembolus.   2. No aortic dissection or aneurysm identified.     3.  Centrilobular emphysema with bronchial wall thickening.    Assessment/Plan:      *Acute hypoxemic respiratory failure [J96.01]   Yes    COPD exacerbation [J44.1]   Yes    History of prior cigarette smoking [Z87.891]   Not Applicable    Current chronic use of systemic steroids [Z79.52]  Supplemental O2 via nasal canula; titrate O2 saturation to >92%.   On Solumedrol 62.5 mg IV q 12 hrs.  Follow Dr. Eddy's recommendations.  Continue beta 2 agonist bronchodilator treatments.   Continue IV antibiotics - Azithromycin and Ceftriaxone.  Check sputum GS and Cx.   Continue routine medications as before.   Influenza rapid screen - negative.    Hypokalemia - corrected  Follow BMP.   Not Applicable      Disposition: DC home once okay with Dr. Eddy         VTE Risk Mitigation         Ordered     enoxaparin injection 40 mg  Daily     Route:  Subcutaneous        12/11/17 1451     Medium Risk of VTE  Once      12/11/17 1451        Sharon Costello MD  Department of Hospital Medicine   [facility]

## 2017-12-14 NOTE — PLAN OF CARE
12/13/17 2059   PRE-TX-O2-ETCO2   O2 Device (Oxygen Therapy) nasal cannula   $ Is the patient on Low Flow Oxygen? Yes   Flow (L/min) 3   Oxygen Concentration (%) 32   SpO2 96 %   Pulse Oximetry Type Intermittent   $ Pulse Oximetry - Multiple Charge Pulse Oximetry - Multiple   Ready to Wean/Extubation Screen   FIO2<=50 (chart decimal) 0.32

## 2017-12-15 ENCOUNTER — OUTSIDE PLACE OF SERVICE (OUTPATIENT)
Dept: PULMONOLOGY | Facility: CLINIC | Age: 67
End: 2017-12-15
Payer: MEDICARE

## 2017-12-15 LAB
ANION GAP SERPL CALC-SCNC: 8 MMOL/L
BASOPHILS # BLD AUTO: 0 K/UL
BASOPHILS NFR BLD: 0 %
BUN SERPL-MCNC: 16 MG/DL
CALCIUM SERPL-MCNC: 8.2 MG/DL
CHLORIDE SERPL-SCNC: 105 MMOL/L
CO2 SERPL-SCNC: 26 MMOL/L
CREAT SERPL-MCNC: 0.7 MG/DL
DIASTOLIC DYSFUNCTION: YES
DIFFERENTIAL METHOD: ABNORMAL
EOSINOPHIL # BLD AUTO: 0 K/UL
EOSINOPHIL NFR BLD: 0 %
ERYTHROCYTE [DISTWIDTH] IN BLOOD BY AUTOMATED COUNT: 12.3 %
EST. GFR  (AFRICAN AMERICAN): >60 ML/MIN/1.73 M^2
EST. GFR  (NON AFRICAN AMERICAN): >60 ML/MIN/1.73 M^2
GLUCOSE SERPL-MCNC: 240 MG/DL
HCT VFR BLD AUTO: 39.1 %
HGB BLD-MCNC: 13.3 G/DL
LYMPHOCYTES # BLD AUTO: 0.5 K/UL
LYMPHOCYTES NFR BLD: 3.7 %
MCH RBC QN AUTO: 31.8 PG
MCHC RBC AUTO-ENTMCNC: 33.9 G/DL
MCV RBC AUTO: 94 FL
MONOCYTES # BLD AUTO: 0.2 K/UL
MONOCYTES NFR BLD: 1.9 %
NEUTROPHILS # BLD AUTO: 11.7 K/UL
NEUTROPHILS NFR BLD: 94.4 %
PLATELET # BLD AUTO: 269 K/UL
PMV BLD AUTO: 8.7 FL
POTASSIUM SERPL-SCNC: 4.1 MMOL/L
RBC # BLD AUTO: 4.17 M/UL
RETIRED EF AND QEF - SEE NOTES: 67 (ref 55–65)
SODIUM SERPL-SCNC: 139 MMOL/L
WBC # BLD AUTO: 12.4 K/UL

## 2017-12-15 PROCEDURE — 25000003 PHARM REV CODE 250: Performed by: INTERNAL MEDICINE

## 2017-12-15 PROCEDURE — 93306 TTE W/DOPPLER COMPLETE: CPT

## 2017-12-15 PROCEDURE — 94640 AIRWAY INHALATION TREATMENT: CPT

## 2017-12-15 PROCEDURE — 99232 SBSQ HOSP IP/OBS MODERATE 35: CPT | Mod: ,,, | Performed by: INTERNAL MEDICINE

## 2017-12-15 PROCEDURE — 80048 BASIC METABOLIC PNL TOTAL CA: CPT

## 2017-12-15 PROCEDURE — 12000002 HC ACUTE/MED SURGE SEMI-PRIVATE ROOM

## 2017-12-15 PROCEDURE — 25000242 PHARM REV CODE 250 ALT 637 W/ HCPCS: Performed by: INTERNAL MEDICINE

## 2017-12-15 PROCEDURE — 94761 N-INVAS EAR/PLS OXIMETRY MLT: CPT

## 2017-12-15 PROCEDURE — 94060 EVALUATION OF WHEEZING: CPT

## 2017-12-15 PROCEDURE — 63600175 PHARM REV CODE 636 W HCPCS: Performed by: NURSE PRACTITIONER

## 2017-12-15 PROCEDURE — 36415 COLL VENOUS BLD VENIPUNCTURE: CPT

## 2017-12-15 PROCEDURE — 63600175 PHARM REV CODE 636 W HCPCS: Performed by: INTERNAL MEDICINE

## 2017-12-15 PROCEDURE — 85025 COMPLETE CBC W/AUTO DIFF WBC: CPT

## 2017-12-15 PROCEDURE — 27000221 HC OXYGEN, UP TO 24 HOURS

## 2017-12-15 RX ORDER — MORPHINE SULFATE 2 MG/ML
1 INJECTION, SOLUTION INTRAMUSCULAR; INTRAVENOUS ONCE
Status: COMPLETED | OUTPATIENT
Start: 2017-12-15 | End: 2017-12-15

## 2017-12-15 RX ADMIN — IPRATROPIUM BROMIDE AND ALBUTEROL SULFATE 3 ML: .5; 3 SOLUTION RESPIRATORY (INHALATION) at 04:12

## 2017-12-15 RX ADMIN — ASPIRIN 81 MG CHEWABLE TABLET 81 MG: 81 TABLET CHEWABLE at 09:12

## 2017-12-15 RX ADMIN — AZITHROMYCIN 500 MG: 250 TABLET, FILM COATED ORAL at 09:12

## 2017-12-15 RX ADMIN — IPRATROPIUM BROMIDE AND ALBUTEROL SULFATE 3 ML: .5; 3 SOLUTION RESPIRATORY (INHALATION) at 07:12

## 2017-12-15 RX ADMIN — HYDROCODONE POLISTIREX AND CHLORPHENIRAMINE POLISITREX 5 ML: 10; 8 SUSPENSION, EXTENDED RELEASE ORAL at 04:12

## 2017-12-15 RX ADMIN — FLUTICASONE FUROATE AND VILANTEROL TRIFENATATE 1 PUFF: 100; 25 POWDER RESPIRATORY (INHALATION) at 01:12

## 2017-12-15 RX ADMIN — ENOXAPARIN SODIUM 40 MG: 100 INJECTION SUBCUTANEOUS at 04:12

## 2017-12-15 RX ADMIN — IPRATROPIUM BROMIDE AND ALBUTEROL SULFATE 3 ML: .5; 3 SOLUTION RESPIRATORY (INHALATION) at 03:12

## 2017-12-15 RX ADMIN — METHYLPREDNISOLONE SODIUM SUCCINATE 62.5 MG: 125 INJECTION, POWDER, FOR SOLUTION INTRAMUSCULAR; INTRAVENOUS at 09:12

## 2017-12-15 RX ADMIN — Medication 1 MG: at 08:12

## 2017-12-15 RX ADMIN — IPRATROPIUM BROMIDE AND ALBUTEROL SULFATE 3 ML: .5; 3 SOLUTION RESPIRATORY (INHALATION) at 01:12

## 2017-12-15 RX ADMIN — FLUTICASONE PROPIONATE 1 SPRAY: 50 SPRAY, METERED NASAL at 09:12

## 2017-12-15 RX ADMIN — METHYLPREDNISOLONE SODIUM SUCCINATE 62.5 MG: 125 INJECTION, POWDER, FOR SOLUTION INTRAMUSCULAR; INTRAVENOUS at 08:12

## 2017-12-15 NOTE — PROGRESS NOTES
Progress Note  PULMONARY    Admit Date: 12/11/2017 12/14/2017      SUBJECTIVE:     Dec 14, miserable sob/mahmood.      PFSH and Allergies reviewed.    OBJECTIVE:     Vitals (Most recent):  Vitals:    12/14/17 1553   BP: 122/87   Pulse: 79   Resp: 18   Temp: 98.1 °F (36.7 °C)       Vitals (24 hour range):  Temp:  [97.5 °F (36.4 °C)-98.6 °F (37 °C)]   Pulse:  [60-92]   Resp:  [18-20]   BP: (122-156)/(65-87)   SpO2:  [94 %-98 %]       Intake/Output Summary (Last 24 hours) at 12/14/17 1825  Last data filed at 12/14/17 0512   Gross per 24 hour   Intake              550 ml   Output              550 ml   Net                0 ml          Physical Exam:  The patient's neuro status (alertness,orientation,cognitive function,motor skills,), pharyngeal exam (oral lesions, hygiene, abn dentition,), Neck (jvd,mass,thyroid,nodes in neck and above/below clavicle),RESPIRATORY(symmetry,effort,fremitus,percussion,auscultation),  Cor(rhythm,heart tones including gallops,perfusion,edema)ABD(distention,hepatic&splenomegaly,tenderness,masses), Skin(rash,cyanosis),Psyc(affect,judgement,).  Exam negative except for these pertinent findings:    Alert,no c/o save sob, nl jvd nor edema, good bs, no distress, nl symmetry/percussion, no clubbing.    Radiographs reviewed: view by direct vision  No new  Results for orders placed during the hospital encounter of 08/26/14   X-Ray Chest 1 View    Narrative COMPARISON: Chest radiograph and CT thorax 8/10/14    FINDINGS: AP portable view of the chest. Monitoring leads and tubing overlie the chest.  No detrimental change.  The lungs are symmetrically hyperinflated with increased lucency of the upper zones suggesting sequela of underlying COPD.  No large   consolidation or new focal opacity.  No large pleural effusion or pneumothorax.  Cardiomediastinal silhouette is within normal limits.  No acute osseous process seen.    Impression No detrimental change or radiographic acute intrathoracic process seen on  this single view.      Electronically signed by: NICOLE JAMES MD, MD  Date:     08/26/14  Time:    23:04    ]    Labs       Recent Labs  Lab 12/14/17 0528   WBC 14.50*   HGB 13.3*   HCT 39.7*        Recent Labs  Lab 12/14/17 0528      K 5.3*      CO2 29   BUN 17   CREATININE 0.7   *   CALCIUM 8.3*   No results for input(s): PH, PCO2, PO2, HCO3 in the last 24 hours.  Microbiology Results (last 7 days)     Procedure Component Value Units Date/Time    Culture, Respiratory with Gram Stain [[id number]] Collected:  12/12/17 0430    Order Status:  Completed Specimen:  Respiratory from Sputum Updated:  12/14/17 0926     Respiratory Culture Normal respiratory aakash     Gram Stain (Respiratory) <10 epithelial cells per low power field.     Gram Stain (Respiratory) Rare WBC's     Gram Stain (Respiratory) Rare Gram positive rods    Blood culture (site 2) [[id number]] Collected:  12/11/17 1542    Order Status:  Completed Specimen:  Blood from Antecubital, Right  Arm Updated:  12/14/17 0612     Blood Culture, Routine No Growth to date     Blood Culture, Routine No Growth to date     Blood Culture, Routine No Growth to date    Narrative:       Site # 2, aerobic only    Blood culture (site 1) [[id number]] Collected:  12/11/17 1542    Order Status:  Completed Specimen:  Blood from Antecubital, Left  Arm Updated:  12/14/17 0612     Blood Culture, Routine No Growth to date     Blood Culture, Routine No Growth to date     Blood Culture, Routine No Growth to date    Narrative:       Site # 1, aerobic and anaerobic          Impression:  Active Hospital Problems    Diagnosis  POA    *Acute hypoxemic respiratory failure [J96.01]  Yes    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               Plan:   Dec 14, still miserable mhamood despite good exam.    Cta good save severe looking emphysema- pt relates dramatic more sob from baseline- works .      Check raman am.  Nebs q 4.  Steroids (pt had eos 1000 2014 and usually takes 5 mg/d).    bnp was nl.  If still sob - ?echo?     He should have pul f/u - il5 rx may help. May need eval for hypereosinophilia???  Needs no smokes.                                    .

## 2017-12-15 NOTE — PROGRESS NOTES
"Progress Note  Pulmonary/Critical Care      Admit Date: 12/11/2017    SUBJECTIVE:     HPI/Interval history (See H&P for complete P,F,SHx) :     Stable overnight, is better but still dyspneic with minimal activity.  To get spirometry today.    Review of Systems: List if applicable    Pain scale: 0/10    Review of Systems   Respiratory: Positive for cough, shortness of breath and wheezing.    All other systems reviewed and are negative.      OBJECTIVE:     Vital Signs Range (Last 24H):  Temp:  [97 °F (36.1 °C)-98.2 °F (36.8 °C)]   Pulse:  [72-96]   Resp:  [14-20]   BP: (122-154)/(69-87)   SpO2:  [94 %-98 %]     I & O (Last 24H):    Intake/Output Summary (Last 24 hours) at 12/15/17 1027  Last data filed at 12/15/17 0600   Gross per 24 hour   Intake             1820 ml   Output             1375 ml   Net              445 ml       Estimated body mass index is 25.58 kg/m² as calculated from the following:    Height as of this encounter: 5' 7" (1.702 m).    Weight as of this encounter: 74.1 kg (163 lb 4.8 oz).    Vent Settings- Oxygen Concentration (%):  [32] 32    ABG    Recent Labs  Lab 12/11/17  0710   PH 7.415   PO2 54*   PCO2 41.6   HCO3 26.7   BE 2       Physical Exam:  Physical Exam   Constitutional: He is oriented to person, place, and time and well-developed, well-nourished, and in no distress. No distress.   HENT:   Head: Normocephalic and atraumatic.   Eyes: Conjunctivae are normal. Pupils are equal, round, and reactive to light.   Neck: Normal range of motion. No JVD present. No tracheal deviation present.   Cardiovascular: Normal rate, regular rhythm and normal heart sounds.    Pulmonary/Chest: Effort normal. No stridor. No respiratory distress. He has wheezes. He has no rales. He exhibits no tenderness.   Late expiratory, worse with forced breath   Abdominal: Soft. Bowel sounds are normal. He exhibits no distension. There is no tenderness.   Musculoskeletal: Normal range of motion. He exhibits no edema, " tenderness or deformity.   Neurological: He is alert and oriented to person, place, and time.   Skin: He is not diaphoretic.   Vitals reviewed.      Laboratory/Diagnostic Data:    Recent Results (from the past 336 hour(s))   CBC with Automated Differential    Collection Time: 12/15/17  5:46 AM   Result Value Ref Range    WBC 12.40 3.90 - 12.70 K/uL    Hemoglobin 13.3 (L) 14.0 - 18.0 g/dL    Hematocrit 39.1 (L) 40.0 - 54.0 %    Platelets 269 150 - 350 K/uL   CBC with Automated Differential    Collection Time: 12/14/17  5:28 AM   Result Value Ref Range    WBC 14.50 (H) 3.90 - 12.70 K/uL    Hemoglobin 13.3 (L) 14.0 - 18.0 g/dL    Hematocrit 39.7 (L) 40.0 - 54.0 %    Platelets 292 150 - 350 K/uL   CBC with Automated Differential    Collection Time: 12/13/17  5:41 AM   Result Value Ref Range    WBC 21.70 (H) 3.90 - 12.70 K/uL    Hemoglobin 14.0 14.0 - 18.0 g/dL    Hematocrit 41.7 40.0 - 54.0 %    Platelets 291 150 - 350 K/uL       Recent Results (from the past 336 hour(s))   Basic Metabolic Panel (BMP)    Collection Time: 12/15/17  5:46 AM   Result Value Ref Range    Sodium 139 136 - 145 mmol/L    Potassium 4.1 3.5 - 5.1 mmol/L    Chloride 105 95 - 110 mmol/L    CO2 26 23 - 29 mmol/L    BUN, Bld 16 8 - 23 mg/dL    Creatinine 0.7 0.5 - 1.4 mg/dL    Calcium 8.2 (L) 8.7 - 10.5 mg/dL    Anion Gap 8 8 - 16 mmol/L   Basic Metabolic Panel (BMP)    Collection Time: 12/14/17  5:28 AM   Result Value Ref Range    Sodium 140 136 - 145 mmol/L    Potassium 5.3 (H) 3.5 - 5.1 mmol/L    Chloride 105 95 - 110 mmol/L    CO2 29 23 - 29 mmol/L    BUN, Bld 17 8 - 23 mg/dL    Creatinine 0.7 0.5 - 1.4 mg/dL    Calcium 8.3 (L) 8.7 - 10.5 mg/dL    Anion Gap 6 (L) 8 - 16 mmol/L   Basic Metabolic Panel (BMP)    Collection Time: 12/13/17  5:41 AM   Result Value Ref Range    Sodium 141 136 - 145 mmol/L    Potassium 4.1 3.5 - 5.1 mmol/L    Chloride 106 95 - 110 mmol/L    CO2 25 23 - 29 mmol/L    BUN, Bld 19 8 - 23 mg/dL    Creatinine 0.7 0.5 - 1.4  mg/dL    Calcium 8.6 (L) 8.7 - 10.5 mg/dL    Anion Gap 10 8 - 16 mmol/L       Lab Results   Component Value Date    ALT 23 12/11/2017    AST 26 12/11/2017    ALKPHOS 59 12/11/2017    BILITOT 0.8 12/11/2017       No results for input(s): PT in the last 24 hours.    Invalid input(s):  INR,  APTT    Microbiology    Microbiology Results (last 7 days)     Procedure Component Value Units Date/Time    Blood culture (site 2) [[id number]] Collected:  12/11/17 1542    Order Status:  Completed Specimen:  Blood from Antecubital, Right  Arm Updated:  12/15/17 0612     Blood Culture, Routine No Growth to date     Blood Culture, Routine No Growth to date     Blood Culture, Routine No Growth to date     Blood Culture, Routine No Growth to date    Narrative:       Site # 2, aerobic only    Blood culture (site 1) [[id number]] Collected:  12/11/17 1542    Order Status:  Completed Specimen:  Blood from Antecubital, Left  Arm Updated:  12/15/17 0612     Blood Culture, Routine No Growth to date     Blood Culture, Routine No Growth to date     Blood Culture, Routine No Growth to date     Blood Culture, Routine No Growth to date    Narrative:       Site # 1, aerobic and anaerobic    Culture, Respiratory with Gram Stain [[id number]] Collected:  12/12/17 0430    Order Status:  Completed Specimen:  Respiratory from Sputum Updated:  12/14/17 0926     Respiratory Culture Normal respiratory aakash     Gram Stain (Respiratory) <10 epithelial cells per low power field.     Gram Stain (Respiratory) Rare WBC's     Gram Stain (Respiratory) Rare Gram positive rods          Radiology    Prior studies reviewed      Imaging Results    None       .  pts (as a   Medications:     albuterol-ipratropium 2.5mg-0.5mg/3mL  3 mL Nebulization Q4H    aspirin  81 mg Oral Daily    azithromycin  500 mg Oral Daily    enoxaparin  40 mg Subcutaneous Daily    fluticasone  1 spray Each Nare Daily    fluticasone-vilanterol  1 puff Inhalation Daily    methylPREDNISolone  sodium succinate  62.5 mg Intravenous Q12H           acetaminophen, acetaminophen, dextrose 50%, dextrose 50%, glucagon (human recombinant), glucose, glucose, hydrocodone-chlorpheniramine, influenza, sodium chloride 0.9%    ASSESSMENT/PLAN:     Active Problems:    Active Hospital Problems    Diagnosis  POA    *Acute hypoxemic respiratory failure [J96.01]  Yes    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     COPD/emphysema - with asthma  - probably better but likely with severe disease  - continue present treatments  - await spirometry  - would consider checking ECHO to assess cardiac status as well  - once stable will need to look at outpt options for therapy  - may be a candidate for anti IL-5 treatment ( elevated eosinophils)    I will continue to follow with the pt.  Please call me at [phone] if you have any questions.      Loy Blackburn MD

## 2017-12-15 NOTE — PLAN OF CARE
Problem: Patient Care Overview  Goal: Plan of Care Review  Outcome: Ongoing (interventions implemented as appropriate)  Pt is easily exerted with locomotion, clear diminished breathe sounds auscultated, pt is encourage to cough and deep breathe, less than 3 seconds capillary refills, maintaining strict I &O's, telemetry monitoring in progress, continue to monitor, observe and note any changes, safety maintain

## 2017-12-15 NOTE — PROGRESS NOTES
"Progress Note  Hospital Medicine  Patient Name:Brad Nowak  MRN:  [id number]  Patient Class: IP- Inpatient  Admit Date: 12/11/2017  Length of Stay: 4 days  Expected Discharge Date:   Attending Physician: Sharon Costello MD  Primary Care Provider:  Jory Glez MD    SUBJECTIVE:     Principal Problem: Acute hypoxemic respiratory failure  Initial history of present illness: Patient is a 67 y.o. male admitted to Hospitalist Service from [facility] Emergency Room with complaint of worsening SOB. Patient reportedly has past medical history significant for COPD and prior history of smoking. Patient reported progressive worsening in SOB for few days. Symptoms started with "cold" symptoms. Patient has been using breathing inhalers many times and nebulizer without much improvement. No sick contact or travel history. Patient denied chest pain, abdominal pain, nausea, vomiting, headache, vision changes, focal neuro-deficits, cough or fever.     PMH/PSH/SH/FH/Meds: reviewed.    Symptoms/Review of Systems: Fever better. Lungs sound better but patient not feeling any better. Still with SOB and SHOEMAKER. Went for PFTs. No chest pain or headache, fever or abdominal pain.     Diet:  Adequate intake.    Activity level: Normal.    Pain:  Patient reports no pain.       OBJECTIVE:   Vital Signs (Most Recent):      Temp: 97.7 °F (36.5 °C) (12/15/17 0325)  Pulse: 92 (12/15/17 0348)  Resp: 19 (12/15/17 0348)  BP: (!) 144/73 (12/15/17 0325)  SpO2: (!) 94 % (12/15/17 0348)       Vital Signs Range (Last 24H):  Temp:  [97 °F (36.1 °C)-98.2 °F (36.8 °C)]   Pulse:  [75-94]   Resp:  [14-20]   BP: (122-154)/(65-87)   SpO2:  [94 %-98 %]     Weight: 74.1 kg (163 lb 4.8 oz)  Body mass index is 25.58 kg/m².    Intake/Output Summary (Last 24 hours) at 12/15/17 0736  Last data filed at 12/14/17 2006   Gross per 24 hour   Intake             1140 ml   Output              575 ml   Net              565 ml     Physical Examination:  General " appearance: well developed, appears stated age  Head: normocephalic, atraumatic  Eyes:  conjunctivae/corneas clear. PERRL.  Nose: Nares normal. Septum midline.  Throat: lips, mucosa, and tongue normal; teeth and gums normal, no throat erythema.  Neck: supple, symmetrical, trachea midline, no JVD and thyroid not enlarged, symmetric, no tenderness/mass/nodules  Lungs:  Improved air movement B/L, no significant wheezing  Chest wall: no tenderness  Heart: regular rate and rhythm, S1, S2 normal, no murmur, click, rub or gallop  Abdomen: soft, non-tender non-distented; bowel sounds normal; no masses,  no organomegaly  Extremities: no cyanosis, clubbing or edema.   Pulses: 2+ and symmetric  Skin: Skin color, texture, turgor normal. No rashes or lesions.  Lymph nodes: Cervical, supraclavicular, and axillary nodes normal.  Neurologic: Normal strength and tone. No focal numbness or weakness. CNII-XII intact.    CBC:    Recent Labs  Lab 12/13/17  0541 12/14/17  0528 12/15/17  0546   WBC 21.70* 14.50* 12.40   RBC 4.45* 4.20* 4.17*   HGB 14.0 13.3* 13.3*   HCT 41.7 39.7* 39.1*    292 269   MCV 94 95 94   MCH 31.5* 31.8* 31.8*   MCHC 33.6 33.6 33.9   BMP    Recent Labs  Lab 12/13/17  0541 12/14/17  0528 12/15/17  0546   * 133* 240*    140 139   K 4.1 5.3* 4.1    105 105   CO2 25 29 26   BUN 19 17 16   CREATININE 0.7 0.7 0.7   CALCIUM 8.6* 8.3* 8.2*      Diagnostic Results:  Microbiology Results (last 7 days)     Procedure Component Value Units Date/Time    Blood culture (site 2) [[id number]] Collected:  12/11/17 1542    Order Status:  Completed Specimen:  Blood from Antecubital, Right  Arm Updated:  12/15/17 0612     Blood Culture, Routine No Growth to date     Blood Culture, Routine No Growth to date     Blood Culture, Routine No Growth to date     Blood Culture, Routine No Growth to date    Narrative:       Site # 2, aerobic only    Blood culture (site 1) [[id number]] Collected:  12/11/17 1542    Order  Status:  Completed Specimen:  Blood from Antecubital, Left  Arm Updated:  12/15/17 0612     Blood Culture, Routine No Growth to date     Blood Culture, Routine No Growth to date     Blood Culture, Routine No Growth to date     Blood Culture, Routine No Growth to date    Narrative:       Site # 1, aerobic and anaerobic    Culture, Respiratory with Gram Stain [[id number]] Collected:  12/12/17 0430    Order Status:  Completed Specimen:  Respiratory from Sputum Updated:  12/14/17 0926     Respiratory Culture Normal respiratory aakash     Gram Stain (Respiratory) <10 epithelial cells per low power field.     Gram Stain (Respiratory) Rare WBC's     Gram Stain (Respiratory) Rare Gram positive rods         Chest X-Ray: No   abnormality identified.     CTA chest:   1. No CT evidence for central or apparent peripheral pulmonary thromboembolus.   2. No aortic dissection or aneurysm identified.     3.  Centrilobular emphysema with bronchial wall thickening.    Assessment/Plan:      *Acute hypoxemic respiratory failure [J96.01]   Yes    COPD exacerbation [J44.1]   Yes    History of prior cigarette smoking [Z87.891]   Not Applicable    Current chronic use of systemic steroids [Z79.52]  Supplemental O2 via nasal canula; titrate O2 saturation to >92%.   On Solumedrol 62.5 mg IV q 12 hrs.  Follow pulmonary recommendations. Follow PFTs.  Obtain 2 D ECHO.  Continue beta 2 agonist bronchodilator treatments.   Continue IV antibiotics - Azithromycin and Ceftriaxone.  Check sputum GS and Cx.   Continue routine medications as before.   Influenza rapid screen - negative.    Hypokalemia - corrected  Follow BMP.   Not Applicable      Disposition: DC home once okay pulmonary team.         VTE Risk Mitigation         Ordered     enoxaparin injection 40 mg  Daily     Route:  Subcutaneous        12/11/17 1451     Medium Risk of VTE  Once      12/11/17 1451        Sharon Costello MD  Department of Hospital Medicine   [facility]

## 2017-12-15 NOTE — PROGRESS NOTES
12/14/17 2011   Patient Assessment/Suction   Level of Consciousness (AVPU) alert   Respiratory Effort Normal;Unlabored   Expansion/Accessory Muscles/Retractions no use of accessory muscles;no retractions   All Lung Fields Breath Sounds clear;diminished   Rhythm/Pattern, Respiratory dyspnea on exertion   Cough Frequency infrequent   Cough Type good;nonproductive   PRE-TX-O2-ETCO2   O2 Device (Oxygen Therapy) nasal cannula   Flow (L/min) 2   Oxygen Concentration (%) 28   SpO2 97 %   Pulse Oximetry Type Intermittent   Pulse 94   Resp 18   Aerosol Therapy   $ Aerosol Therapy Charges Aerosol Treatment   Respiratory Treatment Status given   SVN/Inhaler Treatment Route mask   Position During Treatment HOB at 45 degrees   Patient Tolerance good   Post-Treatment   Post-treatment Heart Rate (beats/min) 95   Post-treatment Resp Rate (breaths/min) 17   All Fields Breath Sounds unchanged   Ready to Wean/Extubation Screen   FIO2<=50 (chart decimal) 0.28

## 2017-12-15 NOTE — PLAN OF CARE
Problem: Patient Care Overview  Goal: Plan of Care Review  Outcome: Ongoing (interventions implemented as appropriate)  Plan of care reviewed with the patient. Pt verbalized understanding. Pt AAO x4. VSS, afebrile on shift. No complaints of pain on shift. Pt did not want cough syrup. IV antibiotics given as ordered. Pt had BM today. Safety maintained. No falls/trauma on shift. Pt repositions independently. Tele monitoring. Will continue to monitor.

## 2017-12-16 LAB
ANION GAP SERPL CALC-SCNC: 7 MMOL/L
BASOPHILS # BLD AUTO: 0 K/UL
BASOPHILS NFR BLD: 0 %
BUN SERPL-MCNC: 16 MG/DL
CALCIUM SERPL-MCNC: 8.8 MG/DL
CHLORIDE SERPL-SCNC: 105 MMOL/L
CO2 SERPL-SCNC: 27 MMOL/L
CREAT SERPL-MCNC: 0.7 MG/DL
DIFFERENTIAL METHOD: ABNORMAL
EOSINOPHIL # BLD AUTO: 0 K/UL
EOSINOPHIL NFR BLD: 0 %
ERYTHROCYTE [DISTWIDTH] IN BLOOD BY AUTOMATED COUNT: 12.3 %
EST. GFR  (AFRICAN AMERICAN): >60 ML/MIN/1.73 M^2
EST. GFR  (NON AFRICAN AMERICAN): >60 ML/MIN/1.73 M^2
GLUCOSE SERPL-MCNC: 155 MG/DL
HCT VFR BLD AUTO: 39.1 %
HGB BLD-MCNC: 13.3 G/DL
LYMPHOCYTES # BLD AUTO: 0.5 K/UL
LYMPHOCYTES NFR BLD: 3.6 %
MCH RBC QN AUTO: 31.9 PG
MCHC RBC AUTO-ENTMCNC: 34.1 G/DL
MCV RBC AUTO: 94 FL
MONOCYTES # BLD AUTO: 0.4 K/UL
MONOCYTES NFR BLD: 3 %
NEUTROPHILS # BLD AUTO: 13.6 K/UL
NEUTROPHILS NFR BLD: 93.4 %
PLATELET # BLD AUTO: 284 K/UL
PMV BLD AUTO: 8.6 FL
POTASSIUM SERPL-SCNC: 4.9 MMOL/L
RBC # BLD AUTO: 4.18 M/UL
SODIUM SERPL-SCNC: 139 MMOL/L
WBC # BLD AUTO: 14.5 K/UL

## 2017-12-16 PROCEDURE — 25000003 PHARM REV CODE 250: Performed by: INTERNAL MEDICINE

## 2017-12-16 PROCEDURE — 99232 SBSQ HOSP IP/OBS MODERATE 35: CPT | Mod: ,,, | Performed by: INTERNAL MEDICINE

## 2017-12-16 PROCEDURE — 94640 AIRWAY INHALATION TREATMENT: CPT

## 2017-12-16 PROCEDURE — 63600175 PHARM REV CODE 636 W HCPCS: Performed by: INTERNAL MEDICINE

## 2017-12-16 PROCEDURE — 12000002 HC ACUTE/MED SURGE SEMI-PRIVATE ROOM

## 2017-12-16 PROCEDURE — 27000221 HC OXYGEN, UP TO 24 HOURS

## 2017-12-16 PROCEDURE — 80048 BASIC METABOLIC PNL TOTAL CA: CPT

## 2017-12-16 PROCEDURE — 85025 COMPLETE CBC W/AUTO DIFF WBC: CPT

## 2017-12-16 PROCEDURE — 94761 N-INVAS EAR/PLS OXIMETRY MLT: CPT

## 2017-12-16 PROCEDURE — 36415 COLL VENOUS BLD VENIPUNCTURE: CPT

## 2017-12-16 PROCEDURE — 25000242 PHARM REV CODE 250 ALT 637 W/ HCPCS: Performed by: INTERNAL MEDICINE

## 2017-12-16 RX ORDER — PREDNISONE 20 MG/1
60 TABLET ORAL DAILY
Status: DISCONTINUED | OUTPATIENT
Start: 2017-12-17 | End: 2017-12-17 | Stop reason: HOSPADM

## 2017-12-16 RX ORDER — RAMELTEON 8 MG/1
8 TABLET ORAL NIGHTLY PRN
Status: DISCONTINUED | OUTPATIENT
Start: 2017-12-16 | End: 2017-12-17 | Stop reason: HOSPADM

## 2017-12-16 RX ORDER — IPRATROPIUM BROMIDE AND ALBUTEROL SULFATE 2.5; .5 MG/3ML; MG/3ML
3 SOLUTION RESPIRATORY (INHALATION) EVERY 6 HOURS
Status: DISCONTINUED | OUTPATIENT
Start: 2017-12-17 | End: 2017-12-17 | Stop reason: HOSPADM

## 2017-12-16 RX ADMIN — FLUTICASONE FUROATE AND VILANTEROL TRIFENATATE 1 PUFF: 100; 25 POWDER RESPIRATORY (INHALATION) at 07:12

## 2017-12-16 RX ADMIN — ASPIRIN 81 MG CHEWABLE TABLET 81 MG: 81 TABLET CHEWABLE at 08:12

## 2017-12-16 RX ADMIN — IPRATROPIUM BROMIDE AND ALBUTEROL SULFATE 3 ML: .5; 3 SOLUTION RESPIRATORY (INHALATION) at 03:12

## 2017-12-16 RX ADMIN — ENOXAPARIN SODIUM 40 MG: 100 INJECTION SUBCUTANEOUS at 04:12

## 2017-12-16 RX ADMIN — IPRATROPIUM BROMIDE AND ALBUTEROL SULFATE 3 ML: .5; 3 SOLUTION RESPIRATORY (INHALATION) at 12:12

## 2017-12-16 RX ADMIN — AZITHROMYCIN 500 MG: 250 TABLET, FILM COATED ORAL at 08:12

## 2017-12-16 RX ADMIN — IPRATROPIUM BROMIDE AND ALBUTEROL SULFATE 3 ML: .5; 3 SOLUTION RESPIRATORY (INHALATION) at 04:12

## 2017-12-16 RX ADMIN — RAMELTEON 8 MG: 8 TABLET, FILM COATED ORAL at 09:12

## 2017-12-16 RX ADMIN — METHYLPREDNISOLONE SODIUM SUCCINATE 62.5 MG: 125 INJECTION, POWDER, FOR SOLUTION INTRAMUSCULAR; INTRAVENOUS at 08:12

## 2017-12-16 RX ADMIN — IPRATROPIUM BROMIDE AND ALBUTEROL SULFATE 3 ML: .5; 3 SOLUTION RESPIRATORY (INHALATION) at 11:12

## 2017-12-16 RX ADMIN — FLUTICASONE PROPIONATE 1 SPRAY: 50 SPRAY, METERED NASAL at 08:12

## 2017-12-16 RX ADMIN — IPRATROPIUM BROMIDE AND ALBUTEROL SULFATE 3 ML: .5; 3 SOLUTION RESPIRATORY (INHALATION) at 06:12

## 2017-12-16 NOTE — PLAN OF CARE
Problem: Patient Care Overview  Goal: Plan of Care Review  Outcome: Ongoing (interventions implemented as appropriate)  Patient receiving aerosol tx via duoneb now and q4hr and Breo mdi x 1 puff now and qday.  Hr 92 and 02 saturation 98% on nc at 2lpm.

## 2017-12-16 NOTE — PLAN OF CARE
12/15/17 1901   Patient Assessment/Suction   Level of Consciousness (AVPU) alert   Respiratory Effort Normal;Unlabored   Expansion/Accessory Muscles/Retractions no use of accessory muscles   All Lung Fields Breath Sounds clear;diminished   PRE-TX-O2-ETCO2   O2 Device (Oxygen Therapy) nasal cannula   Flow (L/min) 3   SpO2 97 %   Pulse 107   Resp 20   Aerosol Therapy   $ Aerosol Therapy Charges Aerosol Treatment   Respiratory Treatment Status given   SVN/Inhaler Treatment Route mask;with oxygen   Position During Treatment HOB at 45 degrees   Patient Tolerance good   Post-Treatment   Post-treatment Heart Rate (beats/min) 103   Post-treatment Resp Rate (breaths/min) 20   All Fields Breath Sounds unchanged   Pt tolerates NC and treatments well.

## 2017-12-16 NOTE — PLAN OF CARE
Problem: Patient Care Overview  Goal: Plan of Care Review  Outcome: Ongoing (interventions implemented as appropriate)  Plan of care reviewed with the patient. Verbalized understanding. BP elevated. Breath sounds diminished. No complaints of pain on shift. Pt ambulated in [place]. Tele monitoring continuing. Pt ran some sinus tachycardic on shift. Safety maintained. IV steroids given per order and PO antibiotics given. Pt coughing up clear mucus. Will continue to monitor.

## 2017-12-17 VITALS
WEIGHT: 162.81 LBS | DIASTOLIC BLOOD PRESSURE: 70 MMHG | HEIGHT: 67 IN | OXYGEN SATURATION: 96 % | HEART RATE: 84 BPM | RESPIRATION RATE: 18 BRPM | TEMPERATURE: 98 F | BODY MASS INDEX: 25.55 KG/M2 | SYSTOLIC BLOOD PRESSURE: 151 MMHG

## 2017-12-17 LAB
ANION GAP SERPL CALC-SCNC: 7 MMOL/L
BACTERIA BLD CULT: NORMAL
BACTERIA BLD CULT: NORMAL
BASOPHILS # BLD AUTO: 0 K/UL
BASOPHILS NFR BLD: 0 %
BUN SERPL-MCNC: 21 MG/DL
CALCIUM SERPL-MCNC: 8.6 MG/DL
CHLORIDE SERPL-SCNC: 107 MMOL/L
CO2 SERPL-SCNC: 30 MMOL/L
CREAT SERPL-MCNC: 0.7 MG/DL
DIFFERENTIAL METHOD: ABNORMAL
EOSINOPHIL # BLD AUTO: 0 K/UL
EOSINOPHIL NFR BLD: 0 %
ERYTHROCYTE [DISTWIDTH] IN BLOOD BY AUTOMATED COUNT: 12.7 %
EST. GFR  (AFRICAN AMERICAN): >60 ML/MIN/1.73 M^2
EST. GFR  (NON AFRICAN AMERICAN): >60 ML/MIN/1.73 M^2
GLUCOSE SERPL-MCNC: 101 MG/DL
HCT VFR BLD AUTO: 37.1 %
HGB BLD-MCNC: 12.5 G/DL
LYMPHOCYTES # BLD AUTO: 1.4 K/UL
LYMPHOCYTES NFR BLD: 9.7 %
MCH RBC QN AUTO: 31.7 PG
MCHC RBC AUTO-ENTMCNC: 33.6 G/DL
MCV RBC AUTO: 94 FL
MONOCYTES # BLD AUTO: 0.9 K/UL
MONOCYTES NFR BLD: 6.6 %
NEUTROPHILS # BLD AUTO: 11.9 K/UL
NEUTROPHILS NFR BLD: 83.7 %
PLATELET # BLD AUTO: 268 K/UL
PMV BLD AUTO: 8.5 FL
POTASSIUM SERPL-SCNC: 4.3 MMOL/L
RBC # BLD AUTO: 3.93 M/UL
SODIUM SERPL-SCNC: 144 MMOL/L
WBC # BLD AUTO: 14.2 K/UL

## 2017-12-17 PROCEDURE — 90471 IMMUNIZATION ADMIN: CPT | Performed by: INTERNAL MEDICINE

## 2017-12-17 PROCEDURE — 94761 N-INVAS EAR/PLS OXIMETRY MLT: CPT

## 2017-12-17 PROCEDURE — 27000221 HC OXYGEN, UP TO 24 HOURS

## 2017-12-17 PROCEDURE — 25000003 PHARM REV CODE 250: Performed by: INTERNAL MEDICINE

## 2017-12-17 PROCEDURE — 63600175 PHARM REV CODE 636 W HCPCS: Performed by: INTERNAL MEDICINE

## 2017-12-17 PROCEDURE — 99232 SBSQ HOSP IP/OBS MODERATE 35: CPT | Mod: ,,, | Performed by: INTERNAL MEDICINE

## 2017-12-17 PROCEDURE — 94640 AIRWAY INHALATION TREATMENT: CPT

## 2017-12-17 PROCEDURE — 85025 COMPLETE CBC W/AUTO DIFF WBC: CPT

## 2017-12-17 PROCEDURE — 3E0234Z INTRODUCTION OF SERUM, TOXOID AND VACCINE INTO MUSCLE, PERCUTANEOUS APPROACH: ICD-10-PCS | Performed by: HOSPITALIST

## 2017-12-17 PROCEDURE — 36415 COLL VENOUS BLD VENIPUNCTURE: CPT

## 2017-12-17 PROCEDURE — 90662 IIV NO PRSV INCREASED AG IM: CPT | Performed by: INTERNAL MEDICINE

## 2017-12-17 PROCEDURE — G0008 ADMIN INFLUENZA VIRUS VAC: HCPCS | Performed by: INTERNAL MEDICINE

## 2017-12-17 PROCEDURE — 63600175 PHARM REV CODE 636 W HCPCS: Performed by: HOSPITALIST

## 2017-12-17 PROCEDURE — 80048 BASIC METABOLIC PNL TOTAL CA: CPT

## 2017-12-17 PROCEDURE — 25000242 PHARM REV CODE 250 ALT 637 W/ HCPCS: Performed by: HOSPITALIST

## 2017-12-17 RX ORDER — HYDROCODONE POLISTIREX AND CHLORPHENIRAMINE POLISTIREX 10; 8 MG/5ML; MG/5ML
5 SUSPENSION, EXTENDED RELEASE ORAL EVERY 12 HOURS PRN
Qty: 115 ML | Refills: 0 | Status: ON HOLD | OUTPATIENT
Start: 2017-12-17 | End: 2020-05-06 | Stop reason: HOSPADM

## 2017-12-17 RX ADMIN — IPRATROPIUM BROMIDE AND ALBUTEROL SULFATE 3 ML: .5; 3 SOLUTION RESPIRATORY (INHALATION) at 07:12

## 2017-12-17 RX ADMIN — ASPIRIN 81 MG CHEWABLE TABLET 81 MG: 81 TABLET CHEWABLE at 08:12

## 2017-12-17 RX ADMIN — FLUTICASONE FUROATE AND VILANTEROL TRIFENATATE 1 PUFF: 100; 25 POWDER RESPIRATORY (INHALATION) at 07:12

## 2017-12-17 RX ADMIN — INFLUENZA A VIRUSA/MICHIGAN/45/2015 X-275 (H1N1) ANTIGEN (FORMALDEHYDE INACTIVATED), INFLUENZA A VIRUS A/HONG KONG/4801/2014 X-263B (H3N2) ANTIGEN (FORMALDEHYDE INACTIVATED), AND INFLUENZA B VIRUS B/BRISBANE/60/2008 ANTIGEN (FORMALDEHYDE INACTIVATED) 0.5 ML: 60; 60; 60 INJECTION, SUSPENSION INTRAMUSCULAR at 12:12

## 2017-12-17 RX ADMIN — PREDNISONE 60 MG: 20 TABLET ORAL at 08:12

## 2017-12-17 RX ADMIN — IPRATROPIUM BROMIDE AND ALBUTEROL SULFATE 3 ML: .5; 3 SOLUTION RESPIRATORY (INHALATION) at 12:12

## 2017-12-17 RX ADMIN — FLUTICASONE PROPIONATE 1 SPRAY: 50 SPRAY, METERED NASAL at 08:12

## 2017-12-17 RX ADMIN — AZITHROMYCIN 500 MG: 250 TABLET, FILM COATED ORAL at 08:12

## 2017-12-17 NOTE — PROGRESS NOTES
"Progress Note  Pulmonary/Critical Care      Admit Date: 12/11/2017    SUBJECTIVE:     HPI/Interval history (See H&P for complete P,F,SHx) :     12/15/2017 - Stable overnight, is better but still dyspneic with minimal activity.  To get spirometry today.    12/16/2017 - Feels better, has been able to ambulate today without much trouble.  Neded a dose of morphine last night due to some pain.  Could not locate spirometry in EMR    12/17/2017 - Feels much better and would like to go home today.  Has been able to ambulate without difficulty.    Review of Systems: List if applicable    Pain scale: 0/10    Review of Systems   Respiratory: Positive for cough, shortness of breath and wheezing.    All other systems reviewed and are negative.      OBJECTIVE:     Vital Signs Range (Last 24H):  Temp:  [97.6 °F (36.4 °C)-98.1 °F (36.7 °C)]   Pulse:  []   Resp:  [18-20]   BP: (143-162)/(67-75)   SpO2:  [93 %-98 %]     I & O (Last 24H):    Intake/Output Summary (Last 24 hours) at 12/17/17 0811  Last data filed at 12/17/17 0555   Gross per 24 hour   Intake              630 ml   Output             1800 ml   Net            -1170 ml       Estimated body mass index is 25.5 kg/m² as calculated from the following:    Height as of this encounter: 5' 7" (1.702 m).    Weight as of this encounter: 73.8 kg (162 lb 12.8 oz).    Vent Settings- Oxygen Concentration (%):  [28] 28    ABG    Recent Labs  Lab 12/11/17  0710   PH 7.415   PO2 54*   PCO2 41.6   HCO3 26.7   BE 2       Physical Exam:  Physical Exam   Constitutional: He is oriented to person, place, and time and well-developed, well-nourished, and in no distress. No distress.   HENT:   Head: Normocephalic and atraumatic.   Eyes: Conjunctivae are normal. Pupils are equal, round, and reactive to light.   Neck: Normal range of motion. No JVD present. No tracheal deviation present.   Cardiovascular: Normal rate, regular rhythm and normal heart sounds.    Pulmonary/Chest: Effort normal. No " stridor. No respiratory distress. He has wheezes. He has no rales. He exhibits no tenderness.   Late expiratory, worse with forced breath   Abdominal: Soft. Bowel sounds are normal. He exhibits no distension. There is no tenderness.   Musculoskeletal: Normal range of motion. He exhibits no edema, tenderness or deformity.   Neurological: He is alert and oriented to person, place, and time.   Skin: He is not diaphoretic.   Vitals reviewed.      Laboratory/Diagnostic Data:    Recent Results (from the past 336 hour(s))   CBC with Automated Differential    Collection Time: 12/17/17  5:19 AM   Result Value Ref Range    WBC 14.20 (H) 3.90 - 12.70 K/uL    Hemoglobin 12.5 (L) 14.0 - 18.0 g/dL    Hematocrit 37.1 (L) 40.0 - 54.0 %    Platelets 268 150 - 350 K/uL   CBC with Automated Differential    Collection Time: 12/16/17  5:46 AM   Result Value Ref Range    WBC 14.50 (H) 3.90 - 12.70 K/uL    Hemoglobin 13.3 (L) 14.0 - 18.0 g/dL    Hematocrit 39.1 (L) 40.0 - 54.0 %    Platelets 284 150 - 350 K/uL   CBC with Automated Differential    Collection Time: 12/15/17  5:46 AM   Result Value Ref Range    WBC 12.40 3.90 - 12.70 K/uL    Hemoglobin 13.3 (L) 14.0 - 18.0 g/dL    Hematocrit 39.1 (L) 40.0 - 54.0 %    Platelets 269 150 - 350 K/uL       Recent Results (from the past 336 hour(s))   Basic Metabolic Panel (BMP)    Collection Time: 12/17/17  5:19 AM   Result Value Ref Range    Sodium 144 136 - 145 mmol/L    Potassium 4.3 3.5 - 5.1 mmol/L    Chloride 107 95 - 110 mmol/L    CO2 30 (H) 23 - 29 mmol/L    BUN, Bld 21 8 - 23 mg/dL    Creatinine 0.7 0.5 - 1.4 mg/dL    Calcium 8.6 (L) 8.7 - 10.5 mg/dL    Anion Gap 7 (L) 8 - 16 mmol/L   Basic Metabolic Panel (BMP)    Collection Time: 12/16/17  5:46 AM   Result Value Ref Range    Sodium 139 136 - 145 mmol/L    Potassium 4.9 3.5 - 5.1 mmol/L    Chloride 105 95 - 110 mmol/L    CO2 27 23 - 29 mmol/L    BUN, Bld 16 8 - 23 mg/dL    Creatinine 0.7 0.5 - 1.4 mg/dL    Calcium 8.8 8.7 - 10.5 mg/dL     Anion Gap 7 (L) 8 - 16 mmol/L   Basic Metabolic Panel (BMP)    Collection Time: 12/15/17  5:46 AM   Result Value Ref Range    Sodium 139 136 - 145 mmol/L    Potassium 4.1 3.5 - 5.1 mmol/L    Chloride 105 95 - 110 mmol/L    CO2 26 23 - 29 mmol/L    BUN, Bld 16 8 - 23 mg/dL    Creatinine 0.7 0.5 - 1.4 mg/dL    Calcium 8.2 (L) 8.7 - 10.5 mg/dL    Anion Gap 8 8 - 16 mmol/L       Lab Results   Component Value Date    ALT 23 12/11/2017    AST 26 12/11/2017    ALKPHOS 59 12/11/2017    BILITOT 0.8 12/11/2017       No results for input(s): PT in the last 24 hours.    Invalid input(s):  INR,  APTT    Microbiology    Microbiology Results (last 7 days)     Procedure Component Value Units Date/Time    Blood culture (site 1) [[id number]] Collected:  12/11/17 1542    Order Status:  Completed Specimen:  Blood from Antecubital, Left  Arm Updated:  12/17/17 0612     Blood Culture, Routine No growth after 5 days.    Narrative:       Site # 1, aerobic and anaerobic    Blood culture (site 2) [[id number]] Collected:  12/11/17 1542    Order Status:  Completed Specimen:  Blood from Antecubital, Right  Arm Updated:  12/17/17 0612     Blood Culture, Routine No growth after 5 days.    Narrative:       Site # 2, aerobic only    Culture, Respiratory with Gram Stain [[id number]] Collected:  12/12/17 0430    Order Status:  Completed Specimen:  Respiratory from Sputum Updated:  12/14/17 0926     Respiratory Culture Normal respiratory aakash     Gram Stain (Respiratory) <10 epithelial cells per low power field.     Gram Stain (Respiratory) Rare WBC's     Gram Stain (Respiratory) Rare Gram positive rods          Radiology    Prior studies reviewed      Imaging Results    None       .  pts (as a   Medications:     albuterol-ipratropium 2.5mg-0.5mg/3mL  3 mL Nebulization Q6H    aspirin  81 mg Oral Daily    azithromycin  500 mg Oral Daily    enoxaparin  40 mg Subcutaneous Daily    fluticasone  1 spray Each Nare Daily    fluticasone-vilanterol  1  puff Inhalation Daily    predniSONE  60 mg Oral Daily           acetaminophen, acetaminophen, dextrose 50%, dextrose 50%, glucagon (human recombinant), glucose, glucose, hydrocodone-chlorpheniramine, influenza, ramelteon, sodium chloride 0.9%    ASSESSMENT/PLAN:     Active Problems:    Active Hospital Problems    Diagnosis  POA    *Acute hypoxemic respiratory failure [J96.01]  Yes    Eosinophilic asthma [J82]  Yes    Acute exacerbation of COPD with asthma [J44.1, J45.901]  Yes      Resolved Hospital Problems    Diagnosis Date Resolved POA   No resolved problems to display.     COPD/emphysema - with asthma  - probably better but likely with severe disease  - will change to oral medications  - Spirometry pending  - ECHO report pending  - once stable will need to look at outpt options for therapy  - may be a candidate for anti IL-5 treatment ( elevated eosinophils)  - ok to DC  On      Prednisone (60 x 3, 50 x 3, 40 x 3, 20 x 3, 10 x 3)    Breo 100/25 1 puff daily    Prn beta agonist    Po zithromax x 5 days    F/u with Dr Eddy in 1-2 weeks    I will continue to follow with the pt.  Please call me at [phone] if you have any questions.      Loy Blackburn MD

## 2017-12-17 NOTE — PLAN OF CARE
12/17/17 0016   Patient Assessment/Suction   Level of Consciousness (AVPU) alert   Respiratory Effort Normal;Unlabored   Expansion/Accessory Muscles/Retractions no use of accessory muscles   All Lung Fields Breath Sounds clear;diminished   PRE-TX-O2-ETCO2   O2 Device (Oxygen Therapy) room air   SpO2 (!) 93 %   Pulse 88   Resp 18   Aerosol Therapy   $ Aerosol Therapy Charges Aerosol Treatment   Respiratory Treatment Status given   SVN/Inhaler Treatment Route mask;with oxygen   Position During Treatment HOB at 30 degrees   Patient Tolerance good   Post-Treatment   Post-treatment Heart Rate (beats/min) 86   Post-treatment Resp Rate (breaths/min) 18   All Fields Breath Sounds unchanged   Pt tolerates NC/RA and treatments well.

## 2017-12-17 NOTE — PROGRESS NOTES
"Progress Note  [facility] Medicine    Admit Date: 12/11/2017    SUBJECTIVE:     Follow-up For:  Acute hypoxemic respiratory failure      Interval history (See H&P for complete P,F,SHx) :  Patient doing well, no acute events. Ambulation without oxygen.    Review of Systems: Systems were reviewed and otherwise negative unless indicated below.  Resp: Cough        OBJECTIVE:     Vital Signs Range (Last 24H):  Temp:  [97.6 °F (36.4 °C)-98.2 °F (36.8 °C)]   Pulse:  []   Resp:  [18-20]   BP: (140-164)/(67-75)   SpO2:  [94 %-99 %]     I & O (Last 24H):    Intake/Output Summary (Last 24 hours) at 12/16/17 2236  Last data filed at 12/16/17 1800   Gross per 24 hour   Intake              630 ml   Output             1200 ml   Net             -570 ml       Estimated body mass index is 25.64 kg/m² as calculated from the following:    Height as of this encounter: 5' 7" (1.702 m).    Weight as of this encounter: 74.3 kg (163 lb 11.2 oz).    Physical Exam:  General- Patient alert and oriented x3 in NAD  HEENT- PERRLA, EOMI, OP clear, MMM  Neck- No JVD, Lymphadenopathy, Thyromegaly  CV- Regular rate and rhythm, No Murmur/griffin/rubs  Resp- Lungs CTA Bilaterally, No increased WOB  GI- Non tender/non-distended, BS normoactive x4 quads, no HSM  Extrem- No cyanosis, clubbing, edema. Pulses 2+ and symmetric  Neuro- Strength 5/5 flexors/extensors, DTRs 2+ and symmetric, Intact sensation to light touch grossly  Skin-  No rashes, masses or lesions noted    Laboratory/Diagnostic Data:  Reviewed and noted in plan where applicable- Please see chart for full lab data.    Medications:  Medication list was reviewed and changes noted under Assessment/Plan.    ASSESSMENT/PLAN:     Active Problems:    Active Hospital Problems    Diagnosis  POA    *Acute hypoxemic respiratory failure [J96.01]-  Likely d/t COPD. Improving. Wean steroids, space nebs to q6. Pulmonary consulted- discussed case with them. Likely able to D/C if doing " well in AM.  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  Yes    Acute exacerbation of COPD with asthma [J44.1, J45.901]-  Tx per above.  Yes      Resolved Hospital Problems    Diagnosis Date Resolved POA   No resolved problems to display.         Disposition- Home    VTE Risk Mitigation         Ordered     enoxaparin injection 40 mg  Daily     Route:  Subcutaneous        12/11/17 1451     Medium Risk of VTE  Once      12/11/17 1451

## 2017-12-17 NOTE — DISCHARGE INSTRUCTIONS
Thank you for choosing [facility] for your medical care. The primary doctor who is taking care of you at the time of your discharge is Sharon Costello MD.     You were admitted to the hospital with Acute hypoxemic respiratory failure.     Please note your discharge instructions, including diet/activity restrictions, follow-up appointments, and medication changes.  If you have any questions about your medical issues, prescriptions, or any other questions, please feel free to contact the [facility] Medicine Dept at [phone] and we will help.    If you are previously with Home health, outpatient PT/OT or under a therapy program, you are cleared to return to those programs.    Please direct all long term medication refills and follow up to your primary care provider, Jory Glez MD. Thank you again for letting us take care of your health care needs.

## 2017-12-17 NOTE — NURSING
Plan of care reviewed with the patient. Pt AAOx4. Vss, afebrile. Denies pain throughout shift. Breath sounds clear. Flu shot administered. IV discontinued. Discharge instructions given, pharmacy verified for medications. Pt verbalized understanding. Pt remained free of falls/trauma. Pt's brother transporting pt home. All items gathered at bedside. Wheelchair assistance provided.

## 2017-12-17 NOTE — PROGRESS NOTES
"Progress Note  Pulmonary/Critical Care      Admit Date: 12/11/2017    SUBJECTIVE:     HPI/Interval history (See H&P for complete P,F,SHx) :     12/15/2017 - Stable overnight, is better but still dyspneic with minimal activity.  To get spirometry today.    12/16/2017 - Feels better, has been able to ambulate today without much trouble.  Neded a dose of morphine last night due to some pain.  Could not locate spirometry in EMR    Review of Systems: List if applicable    Pain scale: 0/10    Review of Systems   Respiratory: Positive for cough, shortness of breath and wheezing.    All other systems reviewed and are negative.      OBJECTIVE:     Vital Signs Range (Last 24H):  Temp:  [97.6 °F (36.4 °C)-98.2 °F (36.8 °C)]   Pulse:  []   Resp:  [18-20]   BP: (140-164)/(67-75)   SpO2:  [94 %-99 %]     I & O (Last 24H):    Intake/Output Summary (Last 24 hours) at 12/16/17 1800  Last data filed at 12/16/17 0455   Gross per 24 hour   Intake                0 ml   Output              600 ml   Net             -600 ml       Estimated body mass index is 25.64 kg/m² as calculated from the following:    Height as of this encounter: 5' 7" (1.702 m).    Weight as of this encounter: 74.3 kg (163 lb 11.2 oz).    Vent Settings- Oxygen Concentration (%):  [28] 28    ABG    Recent Labs  Lab 12/11/17  0710   PH 7.415   PO2 54*   PCO2 41.6   HCO3 26.7   BE 2       Physical Exam:  Physical Exam   Constitutional: He is oriented to person, place, and time and well-developed, well-nourished, and in no distress. No distress.   HENT:   Head: Normocephalic and atraumatic.   Eyes: Conjunctivae are normal. Pupils are equal, round, and reactive to light.   Neck: Normal range of motion. No JVD present. No tracheal deviation present.   Cardiovascular: Normal rate, regular rhythm and normal heart sounds.    Pulmonary/Chest: Effort normal. No stridor. No respiratory distress. He has wheezes. He has no rales. He exhibits no tenderness.   Late expiratory, " worse with forced breath   Abdominal: Soft. Bowel sounds are normal. He exhibits no distension. There is no tenderness.   Musculoskeletal: Normal range of motion. He exhibits no edema, tenderness or deformity.   Neurological: He is alert and oriented to person, place, and time.   Skin: He is not diaphoretic.   Vitals reviewed.      Laboratory/Diagnostic Data:    Recent Results (from the past 336 hour(s))   CBC with Automated Differential    Collection Time: 12/16/17  5:46 AM   Result Value Ref Range    WBC 14.50 (H) 3.90 - 12.70 K/uL    Hemoglobin 13.3 (L) 14.0 - 18.0 g/dL    Hematocrit 39.1 (L) 40.0 - 54.0 %    Platelets 284 150 - 350 K/uL   CBC with Automated Differential    Collection Time: 12/15/17  5:46 AM   Result Value Ref Range    WBC 12.40 3.90 - 12.70 K/uL    Hemoglobin 13.3 (L) 14.0 - 18.0 g/dL    Hematocrit 39.1 (L) 40.0 - 54.0 %    Platelets 269 150 - 350 K/uL   CBC with Automated Differential    Collection Time: 12/14/17  5:28 AM   Result Value Ref Range    WBC 14.50 (H) 3.90 - 12.70 K/uL    Hemoglobin 13.3 (L) 14.0 - 18.0 g/dL    Hematocrit 39.7 (L) 40.0 - 54.0 %    Platelets 292 150 - 350 K/uL       Recent Results (from the past 336 hour(s))   Basic Metabolic Panel (BMP)    Collection Time: 12/16/17  5:46 AM   Result Value Ref Range    Sodium 139 136 - 145 mmol/L    Potassium 4.9 3.5 - 5.1 mmol/L    Chloride 105 95 - 110 mmol/L    CO2 27 23 - 29 mmol/L    BUN, Bld 16 8 - 23 mg/dL    Creatinine 0.7 0.5 - 1.4 mg/dL    Calcium 8.8 8.7 - 10.5 mg/dL    Anion Gap 7 (L) 8 - 16 mmol/L   Basic Metabolic Panel (BMP)    Collection Time: 12/15/17  5:46 AM   Result Value Ref Range    Sodium 139 136 - 145 mmol/L    Potassium 4.1 3.5 - 5.1 mmol/L    Chloride 105 95 - 110 mmol/L    CO2 26 23 - 29 mmol/L    BUN, Bld 16 8 - 23 mg/dL    Creatinine 0.7 0.5 - 1.4 mg/dL    Calcium 8.2 (L) 8.7 - 10.5 mg/dL    Anion Gap 8 8 - 16 mmol/L   Basic Metabolic Panel (BMP)    Collection Time: 12/14/17  5:28 AM   Result Value Ref  Range    Sodium 140 136 - 145 mmol/L    Potassium 5.3 (H) 3.5 - 5.1 mmol/L    Chloride 105 95 - 110 mmol/L    CO2 29 23 - 29 mmol/L    BUN, Bld 17 8 - 23 mg/dL    Creatinine 0.7 0.5 - 1.4 mg/dL    Calcium 8.3 (L) 8.7 - 10.5 mg/dL    Anion Gap 6 (L) 8 - 16 mmol/L       Lab Results   Component Value Date    ALT 23 12/11/2017    AST 26 12/11/2017    ALKPHOS 59 12/11/2017    BILITOT 0.8 12/11/2017       No results for input(s): PT in the last 24 hours.    Invalid input(s):  INR,  APTT    Microbiology    Microbiology Results (last 7 days)     Procedure Component Value Units Date/Time    Blood culture (site 2) [[id number]] Collected:  12/11/17 1542    Order Status:  Completed Specimen:  Blood from Antecubital, Right  Arm Updated:  12/16/17 0612     Blood Culture, Routine No Growth to date     Blood Culture, Routine No Growth to date     Blood Culture, Routine No Growth to date     Blood Culture, Routine No Growth to date     Blood Culture, Routine No Growth to date    Narrative:       Site # 2, aerobic only    Blood culture (site 1) [[id number]] Collected:  12/11/17 1542    Order Status:  Completed Specimen:  Blood from Antecubital, Left  Arm Updated:  12/16/17 0612     Blood Culture, Routine No Growth to date     Blood Culture, Routine No Growth to date     Blood Culture, Routine No Growth to date     Blood Culture, Routine No Growth to date     Blood Culture, Routine No Growth to date    Narrative:       Site # 1, aerobic and anaerobic    Culture, Respiratory with Gram Stain [[id number]] Collected:  12/12/17 0430    Order Status:  Completed Specimen:  Respiratory from Sputum Updated:  12/14/17 0926     Respiratory Culture Normal respiratory aakash     Gram Stain (Respiratory) <10 epithelial cells per low power field.     Gram Stain (Respiratory) Rare WBC's     Gram Stain (Respiratory) Rare Gram positive rods          Radiology    Prior studies reviewed      Imaging Results    None       .  pts (as a   Medications:      albuterol-ipratropium 2.5mg-0.5mg/3mL  3 mL Nebulization Q4H    aspirin  81 mg Oral Daily    azithromycin  500 mg Oral Daily    enoxaparin  40 mg Subcutaneous Daily    fluticasone  1 spray Each Nare Daily    fluticasone-vilanterol  1 puff Inhalation Daily    methylPREDNISolone sodium succinate  62.5 mg Intravenous Q12H           acetaminophen, acetaminophen, dextrose 50%, dextrose 50%, glucagon (human recombinant), glucose, glucose, hydrocodone-chlorpheniramine, influenza, sodium chloride 0.9%    ASSESSMENT/PLAN:     Active Problems:    Active Hospital Problems    Diagnosis  POA    *Acute hypoxemic respiratory failure [J96.01]  Yes    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     COPD/emphysema - with asthma  - probably better but likely with severe disease  - will change to oral medications  - Spirometry pending  - ECHO report pending  - once stable will need to look at outpt options for therapy  - may be a candidate for anti IL-5 treatment ( elevated eosinophils)  - if stable could possibly be DC tomorrow    I will continue to follow with the pt.  Please call me at [phone] if you have any questions.      Loy Blackburn MD

## 2017-12-17 NOTE — PLAN OF CARE
12/17/2017    Patient: Brad Nowak    YOB: 1950    To whom it may concern,    Brad Nowak was admitted to the hospital on 12/11/2017 and was discharged on 12/17/2017. Patient was under my care at the hospital and is cleared to return to work on 12/19/17, with no restrictions. If you have any questions or concerns, please don't hesitate to contact the department of hospital medicine at [facility] at [phone].     Sincerely,    Floyd Castillo MD.    Department of Hospital Medicine

## 2017-12-17 NOTE — PLAN OF CARE
Problem: Patient Care Overview  Goal: Plan of Care Review  Outcome: Ongoing (interventions implemented as appropriate)  Patient receiving aerosol tx via duoneb now and q4hr.  Hr 66 and 02 saturation 97% on room air.  Patient receiving Breo mdi now and qday.  02 at 2lpm nc.

## 2017-12-18 ENCOUNTER — TELEPHONE (OUTPATIENT)
Dept: PULMONOLOGY | Facility: CLINIC | Age: 67
End: 2017-12-18

## 2017-12-18 NOTE — PLAN OF CARE
12/18/17 0748   Final Note   Assessment Type Final Discharge Note   Discharge Disposition Home

## 2017-12-18 NOTE — HOSPITAL COURSE
Patient is a 67-year-old  male with a past medical history significant for COPD and emphysema who presents to hospital with acute hypoxic respiratory failure.  Patient was initially started on aggressive therapy including antibiotics, steroids, and inhaled beta agonist.  Patient continued to improve over the course the next 3-5 days.  Pulmonary was consulted and provided further evaluation and management.  Patient did well and reverted back to his clinical baseline.  His antibiotics and steroids were converted to oral and the patient finished his treatment course in the hospital of antibiotics.  He was discharged home on tapering dose of steroids as well as nebs and a follow-up with his primary care provider.  Patient was stable on room air at time of discharge.  Plan of care is reviewed with the patient in detail at time of discharge.

## 2017-12-18 NOTE — TELEPHONE ENCOUNTER
----- Message from Argenis Sykes sent at 12/18/2017 11:33 AM CST -----  Contact: self [phone]  You saw him in the hospital and instructed him to see you for a follow up.  He would like to be seen on 12/27 or 12/28.  Please call him.  Thank you!

## 2017-12-18 NOTE — DISCHARGE SUMMARY
[facility] Hospital Medicine  Discharge Summary      Patient Name: Brad Nowak  MRN: [id number]  Admission Date: 12/11/2017  Hospital Length of Stay: 6 days  Discharge Date and Time: 12/17/2017 12:20 PM  Attending Physician: No att. providers found   Discharging Provider: Floyd Castillo MD  Primary Care Provider: Jory Glez MD      HPI:   No notes on file    * No surgery found *      Hospital Course:   Patient is a 67-year-old  male with a past medical history significant for COPD and emphysema who presents to hospital with acute hypoxic respiratory failure.  Patient was initially started on aggressive therapy including antibiotics, steroids, and inhaled beta agonist.  Patient continued to improve over the course the next 3-5 days.  Pulmonary was consulted and provided further evaluation and management.  Patient did well and reverted back to his clinical baseline.  His antibiotics and steroids were converted to oral and the patient finished his treatment course in the hospital of antibiotics.  He was discharged home on tapering dose of steroids as well as nebs and a follow-up with his primary care provider.  Patient was stable on room air at time of discharge.  Plan of care is reviewed with the patient in detail at time of discharge.     Consults:   Consults         Status Ordering Provider     Inpatient consult to Pulmonology  Once     Provider:  Miguel Ángel Eddy MD    Completed YOLANDA SARAH          No new Assessment & Plan notes have been filed under this hospital service since the last note was generated.  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    Specialty:  Family Medicine  Contact information:  [address]  [place] [id number] [phone]                 Patient Instructions:     Diet general     Activity as tolerated     Call MD for:  temperature >100.4     Call MD for:  difficulty breathing or increased cough         Significant Diagnostic Studies: Labs:   BMP:   Recent Labs  Lab 12/16/17  0546 12/17/17  0519   * 101    144   K 4.9 4.3    107   CO2 27 30*   BUN 16 21   CREATININE 0.7 0.7   CALCIUM 8.8 8.6*    and CBC   Recent Labs  Lab 12/16/17 0546 12/17/17  0519   WBC 14.50* 14.20*   HGB 13.3* 12.5*   HCT 39.1* 37.1*    268       Pending Diagnostic Studies:     None         Medications:  Reconciled Home Medications:   Discharge Medication List as of 12/17/2017 12:21 PM      START taking these medications    Details   fluticasone-vilanterol (BREO ELLIPTA) 200-25 mcg/dose DsDv diskus inhaler Inhale 1 puff into the lungs once daily., Starting Wed 12/13/2017, Normal      hydrocodone-chlorpheniramine (TUSSIONEX) 10-8 mg/5 mL suspension Take 5 mLs by mouth every 12 (twelve) hours as needed for Cough., Starting Sun 12/17/2017, Normal      montelukast (SINGULAIR) 10 mg tablet Take 1 tablet (10 mg total) by mouth every evening., Starting Wed 12/13/2017, Normal      !! predniSONE (DELTASONE) 20 MG tablet Take 3 daily for 7 days then 2 daily for 7 days then 1 daily for 7 days., Normal       !! - Potential duplicate medications found. Please discuss with provider.      CONTINUE these medications which have NOT CHANGED    Details   albuterol (ACCUNEB) 1.25 mg/3 mL Nebu Take 1.25 mg by nebulization every 6 (six) hours as needed. Rescue, Historical Med      ALBUTEROL SULFATE (VENTOLIN INHL) Inhale into the lungs., Historical Med      aspirin 81 MG Chew Take 1 tablet  (81 mg total) by mouth once daily., Starting 1/22/2016, Until Discontinued, No Print      calcium citrate (CALCITRATE) 200 mg (950 mg) tablet Take 1 tablet by mouth once daily., Historical Med      fluticasone (FLONASE) 50 mcg/actuation nasal spray 1 spray by Each Nare route once daily., Starting 1/22/2016, Until Discontinued, Normal      LEVALBUTEROL HCL (XOPENEX INHL) Inhale into the lungs., Historical Med      !! predniSONE (DELTASONE) 5 MG tablet Starting 1/8/2016, Until Discontinued, Historical Med      guaifenesin-codeine 100-10 mg/5 ml (TUSSI-ORGANIDIN NR)  mg/5 mL syrup Take 10 mLs by mouth every 6 (six) hours as needed for Cough., Starting 10/3/2014, Until Discontinued, Print       !! - Potential duplicate medications found. Please discuss with provider.      STOP taking these medications       fluticasone-vilanterol (BREO ELLIPTA) 100-25 mcg/dose diskus inhaler Comments:   Reason for Stopping:         roflumilast (DALIRESP) 500 mcg Tab Comments:   Reason for Stopping:               Indwelling Lines/Drains at time of discharge:   Lines/Drains/Airways          No matching active lines, drains, or airways          Time spent on the discharge of patient: 32 minutes  Patient was seen and examined on the date of discharge and determined to be suitable for discharge.         Floyd Castillo MD  Department of Hospital Medicine  [facility]

## 2017-12-28 ENCOUNTER — OFFICE VISIT (OUTPATIENT)
Dept: PULMONOLOGY | Facility: CLINIC | Age: 67
End: 2017-12-28
Payer: MEDICARE

## 2017-12-28 VITALS
DIASTOLIC BLOOD PRESSURE: 70 MMHG | HEIGHT: 67 IN | BODY MASS INDEX: 25.06 KG/M2 | HEART RATE: 102 BPM | SYSTOLIC BLOOD PRESSURE: 147 MMHG | WEIGHT: 159.63 LBS | OXYGEN SATURATION: 97 %

## 2017-12-28 DIAGNOSIS — Z92.241 STEROID-DEPENDENT CHRONIC OBSTRUCTIVE PULMONARY DISEASE: ICD-10-CM

## 2017-12-28 DIAGNOSIS — J82.83 EOSINOPHILIC ASTHMA: ICD-10-CM

## 2017-12-28 DIAGNOSIS — J44.1 ACUTE EXACERBATION OF COPD WITH ASTHMA: ICD-10-CM

## 2017-12-28 DIAGNOSIS — Z77.090 ASBESTOS EXPOSURE: ICD-10-CM

## 2017-12-28 DIAGNOSIS — Z79.52 CURRENT CHRONIC USE OF SYSTEMIC STEROIDS: Primary | ICD-10-CM

## 2017-12-28 DIAGNOSIS — J44.9 STEROID-DEPENDENT CHRONIC OBSTRUCTIVE PULMONARY DISEASE: ICD-10-CM

## 2017-12-28 DIAGNOSIS — J45.901 ACUTE EXACERBATION OF COPD WITH ASTHMA: ICD-10-CM

## 2017-12-28 PROCEDURE — 99213 OFFICE O/P EST LOW 20 MIN: CPT | Mod: PBBFAC,PO | Performed by: INTERNAL MEDICINE

## 2017-12-28 PROCEDURE — 99214 OFFICE O/P EST MOD 30 MIN: CPT | Mod: S$PBB,,, | Performed by: INTERNAL MEDICINE

## 2017-12-28 PROCEDURE — 99999 PR PBB SHADOW E&M-EST. PATIENT-LVL III: CPT | Mod: PBBFAC,,, | Performed by: INTERNAL MEDICINE

## 2017-12-28 RX ORDER — ATORVASTATIN CALCIUM 20 MG/1
TABLET, FILM COATED ORAL
Status: ON HOLD | COMMUNITY
Start: 2017-11-10 | End: 2020-05-06 | Stop reason: HOSPADM

## 2017-12-28 RX ORDER — PREDNISONE 20 MG/1
TABLET ORAL
Qty: 42 TABLET | Refills: 1 | Status: SHIPPED | OUTPATIENT
Start: 2017-12-28 | End: 2018-07-05 | Stop reason: SDUPTHER

## 2017-12-28 RX ORDER — PREDNISONE 5 MG/1
5 TABLET ORAL DAILY
Qty: 30 TABLET | Refills: 11 | Status: SHIPPED | OUTPATIENT
Start: 2017-12-28 | End: 2018-04-05 | Stop reason: SDUPTHER

## 2017-12-28 RX ORDER — CYCLOBENZAPRINE HCL 10 MG
TABLET ORAL
Status: ON HOLD | COMMUNITY
Start: 2017-11-13 | End: 2020-05-06 | Stop reason: HOSPADM

## 2017-12-28 NOTE — PATIENT INSTRUCTIONS
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    Need shingles vaccine.    Need to continue breo and singulair and prednisone 5 mg daily.     Taper down 20 mg prednisone pills as instructed. May need to repeat prednisone course.     Lungs measure 50% capacity on breathing test.    Add incruse to teat emphysema better.

## 2017-12-28 NOTE — PROGRESS NOTES
12/28/2017    [facility] Follow Up    Chief Complaint   Patient presents with    Hospital Follow Up    COPD    Emphysema       HPI: pt very active, bikes to work with tool boxes with no problem. Had had hospital stays every 6 months til started prednisone continiously  5 yrs ago.  Pt recently flared with recent NSR stay. Pt had pft with fev1 50% at 1.53 liter 12/15/2017.  Very mahmood still.  On breo and singulair.  Use spiriva in past.    From consult last month:Plan:   Pt has eosinophillic asthma and copd.  Pt should respond to singulair.  Higher dose steroid action plan needed.  He would likely do very well with il5 rx.     outpt soon on prednisone 60/d x 7 with taper, going to breo 200, and singulair would be good with current rx.  Would not strop prednisone.  outpt f/u recommended. I do no see asbestos complications.  History of Present Illness:  Cc is sob x 3 days.   Pt worked refinery as .  Stopped smoking yrs ago. FH + asthma with no prior h/o asthma.  Has had cough and wheezes and sob going back prior to [place].  Pt noted needed freq steroids so was kept on prednisone 5 mg daily with need to increase dose ever 2-3 months because of increase sob.  Pt uses breo 100 and xopenex works better than ventolin.  Pt has chr nasal drainage but no bad infections.  abx not used.  He denies spiriva helped in past and denies using daliresp.    Pt rides bike to work - bike weighs 300 lbs with tools for trade.  Pt very active and still works - lives with daughter.            The chief compliant  problem is varies with instablilty at time   PFSH:  Past Medical History:   Diagnosis Date    Bronchitis     COPD (chronic obstructive pulmonary disease)          Past Surgical History:   Procedure Laterality Date    HERNIA REPAIR      WRIST SURGERY       Social History   Substance Use Topics    Smoking status: Former Smoker     Packs/day: 2.00     Years: 40.00     Quit date: 3/24/2009    Smokeless  "tobacco: Never Used    Alcohol use Yes      Comment: 6-12 beers/night; some days none.     Family History   Problem Relation Age of Onset    Cancer Mother     Asthma Mother     Heart disease Father     COPD Father     Cancer Father     Cancer Brother      colon cancer    No Known Problems Brother     No Known Problems Brother      Review of patient's allergies indicates:  No Known Allergies    Performance Status:The patient's activity level is functions out of house.      Review of Systems:  a review of eleven systems covering constitutional, Eye, HEENT, Psych, Respiratory, Cardiac, GI, , Musculoskeletal, Endocrine, Dermatologic was negative except for pertinent findings as listed ABOVE and below: all good save above.     Exam:Comprehensive exam done. BP (!) 147/70 (BP Location: Right arm, Patient Position: Sitting)   Pulse 102   Ht 5' 7" (1.702 m)   Wt 72.4 kg (159 lb 9.8 oz)   SpO2 97%   BMI 25.00 kg/m²   Exam included Vitals as listed, and patient's appearance and affect and alertness and mood, oral exam for yeast and hygiene and pharynx lesions and Mallapatti (M) score, neck with inspection for jvd and masses and thyroid abnormalities and lymph nodes (supraclavicular and infraclavicular nodes and axillary also examined and noted if abn), chest exam included symmetry and effort and fremitus and percussion and auscultation, cardiac exam included rhythm and gallops and murmur and rubs and jvd and edema, abdominal exam for mass and hepatosplenomegaly and tenderness and hernias and bowel sounds, Musculoskeletal exam with muscle tone and posture and mobility/gait and  strength, and skin for rashes and cyanosis and pallor and turgor, extremity for clubbing.  Findings were normal except for pertinent findings listed below:  M3, good exam. Sl cushingnoid    Radiographs (ct chest and cxr) reviewed: view by direct vision  Ct chest dec 2017 with min pleural plaque right and bilat emphysema.    Labs " reviewed       Results for MADELEINE EVANS (MRN [id number]) as of 12/28/2017 14:15   Ref. Range 12/11/2017 05:14   WBC Latest Ref Range: 3.90 - 12.70 K/uL 10.30   RBC Latest Ref Range: 4.60 - 6.20 M/uL 4.66   Hemoglobin Latest Ref Range: 14.0 - 18.0 g/dL 14.5   Hematocrit Latest Ref Range: 40.0 - 54.0 % 44.0   MCV Latest Ref Range: 82 - 98 fL 94   MCH Latest Ref Range: 27.0 - 31.0 pg 31.1 (H)   MCHC Latest Ref Range: 32.0 - 36.0 g/dL 33.0   RDW Latest Ref Range: 11.5 - 14.5 % 13.0   Platelets Latest Ref Range: 150 - 350 K/uL 295   MPV Latest Ref Range: 9.2 - 12.9 fL 9.1 (L)   Gran% Latest Ref Range: 38.0 - 73.0 % 67.4   Gran # Latest Ref Range: 1.8 - 7.7 K/uL 6.9   Lymph% Latest Ref Range: 18.0 - 48.0 % 18.2   Lymph # Latest Ref Range: 1.0 - 4.8 K/uL 1.9   Mono% Latest Ref Range: 4.0 - 15.0 % 7.9   Mono # Latest Ref Range: 0.3 - 1.0 K/uL 0.8   Eosinophil% Latest Ref Range: 0.0 - 8.0 % 5.3   Eos # Latest Ref Range: 0.0 - 0.5 K/uL 0.5   Basophil% Latest Ref Range: 0.0 - 1.9 % 1.2   Baso # Latest Ref Range: 0.00 - 0.20 K/uL 0.10   Protime Latest Ref Range: 9.7 - 11.8 sec 10.2   Coumadin Monitoring INR Latest Ref Range: 0.8 - 1.2  1.0     PFT results reviewed fev1 50 % at 1.53 with no bd response 12/15/2017      CONCLUSIONS     1 - Concentric remodeling.     2 - No wall motion abnormalities.     3 - Normal left ventricular systolic function (EF 65-70%).     4 - Impaired LV relaxation, normal LAP (grade 1 diastolic dysfunction).     5 - Normal right ventricular systolic function .   This document has been electronically    SIGNED BY: Benjamin Brock MD On: 12/15/2017 16:08    Plan:  Clinical impression is resonably certain and repeated evaluation prn +/- follow up will be needed as below.    Madeleine was seen today for hospital follow up, copd and emphysema.    Diagnoses and all orders for this visit:    Current chronic use of systemic steroids    Eosinophilic asthma    Steroid-dependent chronic obstructive pulmonary disease  -      umeclidinium (INCRUSE ELLIPTA) 62.5 mcg/actuation DsDv; Inhale 1 puff into the lungs once daily. Controller  -     predniSONE (DELTASONE) 20 MG tablet; Take 3 daily for 7 days then 2 daily for 7 days then 1 daily for 7 days.  -     predniSONE (DELTASONE) 5 MG tablet; Take 1 tablet (5 mg total) by mouth once daily.    Acute exacerbation of COPD with asthma  -     umeclidinium (INCRUSE ELLIPTA) 62.5 mcg/actuation DsDv; Inhale 1 puff into the lungs once daily. Controller  -     predniSONE (DELTASONE) 20 MG tablet; Take 3 daily for 7 days then 2 daily for 7 days then 1 daily for 7 days.  -     predniSONE (DELTASONE) 5 MG tablet; Take 1 tablet (5 mg total) by mouth once daily.    Asbestos exposure        Return in about 3 months (around 3/28/2018), or if symptoms worsen or fail to improve.    Discussed with patient above for education the following:      Patient Instructions   You have eosinophilic asthma that is severe persistent steroid dependant, you have emphysema and asbestos disease also.    Need shingles vaccine.    Need to continue breo and singulair and prednisone 5 mg daily.     Taper down 20 mg prednisone pills as instructed. May need to repeat prednisone course.     Lungs measure 50% capacity on breathing test.    Add incruse to teat emphysema better.

## 2018-04-05 ENCOUNTER — OFFICE VISIT (OUTPATIENT)
Dept: PULMONOLOGY | Facility: CLINIC | Age: 68
End: 2018-04-05
Payer: MEDICARE

## 2018-04-05 VITALS
HEIGHT: 67 IN | HEART RATE: 89 BPM | WEIGHT: 169.31 LBS | BODY MASS INDEX: 26.57 KG/M2 | OXYGEN SATURATION: 96 % | SYSTOLIC BLOOD PRESSURE: 154 MMHG | DIASTOLIC BLOOD PRESSURE: 71 MMHG

## 2018-04-05 DIAGNOSIS — J82.83 EOSINOPHILIC ASTHMA: Primary | ICD-10-CM

## 2018-04-05 DIAGNOSIS — Z92.241 STEROID-DEPENDENT CHRONIC OBSTRUCTIVE PULMONARY DISEASE: ICD-10-CM

## 2018-04-05 DIAGNOSIS — J45.901 ACUTE EXACERBATION OF COPD WITH ASTHMA: ICD-10-CM

## 2018-04-05 DIAGNOSIS — J44.1 ACUTE EXACERBATION OF COPD WITH ASTHMA: ICD-10-CM

## 2018-04-05 DIAGNOSIS — J44.9 STEROID-DEPENDENT CHRONIC OBSTRUCTIVE PULMONARY DISEASE: ICD-10-CM

## 2018-04-05 PROCEDURE — 99213 OFFICE O/P EST LOW 20 MIN: CPT | Mod: PBBFAC,PO | Performed by: INTERNAL MEDICINE

## 2018-04-05 PROCEDURE — 99214 OFFICE O/P EST MOD 30 MIN: CPT | Mod: S$PBB,,, | Performed by: INTERNAL MEDICINE

## 2018-04-05 PROCEDURE — 99999 PR PBB SHADOW E&M-EST. PATIENT-LVL III: CPT | Mod: PBBFAC,,, | Performed by: INTERNAL MEDICINE

## 2018-04-05 RX ORDER — PREDNISONE 5 MG/1
10 TABLET ORAL DAILY
Qty: 30 TABLET | Refills: 11 | Status: SHIPPED | OUTPATIENT
Start: 2018-04-05 | End: 2018-07-05 | Stop reason: SDUPTHER

## 2018-04-05 NOTE — PATIENT INSTRUCTIONS
You have eosinophilic asthma and copd.  Your lung capacity is about 50%.      special shot should allow lungs to do better with decreasing steroids.  Shot increases shingles risk.    Prednisone 5 mg daily - try to go to 10 mg daily now for 2 weeks then 1 daily.  Get shingle vaccine once on prednisone 5 mg daily.    trelegy has breo and incurse - would recommend just trelegy when breo/incruse out

## 2018-04-05 NOTE — PROGRESS NOTES
4/5/2018    [facility] Follow Up    Chief Complaint   Patient presents with    Follow-up    COPD    Shortness of Breath       HPI:   April 5 - increase prednisone from 5/d to 20/d since last yr - breathing still poor.  Rest good - but not better on prednisone 20/d.  No big improvement on incruse. Sinus better on flonase.      12/28/2017 pt very active, bikes to work with tool boxes with no problem. Had had hospital stays every 6 months til started prednisone continiously  5 yrs ago.  Pt recently flared with recent NSR stay. Pt had pft with fev1 50% at 1.53 liter 12/15/2017.  Very mahmood still.  On breo and singulair.  Use spiriva in past.     From consult last month:Plan:   Pt has eosinophillic asthma and copd.  Pt should respond to singulair.  Higher dose steroid action plan needed.  He would likely do very well with il5 rx.     outpt soon on prednisone 60/d x 7 with taper, going to breo 200, and singulair would be good with current rx.  Would not strop prednisone.  outpt f/u recommended. I do no see asbestos complications.  History of Present Illness:  Cc is sob x 3 days.   Pt worked refinery as .  Stopped smoking yrs ago. FH + asthma with no prior h/o asthma.  Has had cough and wheezes and sob going back prior to [place].  Pt noted needed freq steroids so was kept on prednisone 5 mg daily with need to increase dose ever 2-3 months because of increase sob.  Pt uses breo 100 and xopenex works better than ventolin.  Pt has chr nasal drainage but no bad infections.  abx not used.  He denies spiriva helped in past and denies using daliresp.    Pt rides bike to work - bike weighs 300 lbs with tools for trade.  Pt very active and still works - lives with daughter.            The chief compliant  problem is varies with instablilty at time   PFSH:  Past Medical History:   Diagnosis Date    Bronchitis     COPD (chronic obstructive pulmonary disease)          Past Surgical History:   Procedure  "Laterality Date    HERNIA REPAIR      WRIST SURGERY       Social History   Substance Use Topics    Smoking status: Former Smoker     Packs/day: 2.00     Years: 40.00     Quit date: 3/24/2009    Smokeless tobacco: Never Used    Alcohol use Yes      Comment: 6-12 beers/night; some days none.     Family History   Problem Relation Age of Onset    Cancer Mother     Asthma Mother     Heart disease Father     COPD Father     Cancer Father     Cancer Brother      colon cancer    No Known Problems Brother     No Known Problems Brother      Review of patient's allergies indicates:  No Known Allergies    Performance Status:The patient's activity level is functions out of house.      Review of Systems:  a review of eleven systems covering constitutional, Eye, HEENT, Psych, Respiratory, Cardiac, GI, , Musculoskeletal, Endocrine, Dermatologic was negative except for pertinent findings as listed ABOVE and below: all good save above.     Exam:Comprehensive exam done. BP (!) 154/71 (BP Location: Left arm, Patient Position: Sitting)   Pulse 89   Ht 5' 7" (1.702 m)   Wt 76.8 kg (169 lb 5 oz)   SpO2 96%   BMI 26.52 kg/m²   Exam included Vitals as listed, and patient's appearance and affect and alertness and mood, oral exam for yeast and hygiene and pharynx lesions and Mallapatti (M) score, neck with inspection for jvd and masses and thyroid abnormalities and lymph nodes (supraclavicular and infraclavicular nodes and axillary also examined and noted if abn), chest exam included symmetry and effort and fremitus and percussion and auscultation, cardiac exam included rhythm and gallops and murmur and rubs and jvd and edema, abdominal exam for mass and hepatosplenomegaly and tenderness and hernias and bowel sounds, Musculoskeletal exam with muscle tone and posture and mobility/gait and  strength, and skin for rashes and cyanosis and pallor and turgor, extremity for clubbing.  Findings were normal except for pertinent " findings listed below:  M3, good exam. Sl cushingnoid    Radiographs (ct chest and cxr) reviewed: view by direct vision  Ct chest dec 2017 with min pleural plaque right and bilat emphysema.    Labs reviewed       Results for MADELEINE EVANS (MRN [id number]) as of 12/28/2017 14:15   Ref. Range 12/11/2017 05:14   WBC Latest Ref Range: 3.90 - 12.70 K/uL 10.30   RBC Latest Ref Range: 4.60 - 6.20 M/uL 4.66   Hemoglobin Latest Ref Range: 14.0 - 18.0 g/dL 14.5   Hematocrit Latest Ref Range: 40.0 - 54.0 % 44.0   MCV Latest Ref Range: 82 - 98 fL 94   MCH Latest Ref Range: 27.0 - 31.0 pg 31.1 (H)   MCHC Latest Ref Range: 32.0 - 36.0 g/dL 33.0   RDW Latest Ref Range: 11.5 - 14.5 % 13.0   Platelets Latest Ref Range: 150 - 350 K/uL 295   MPV Latest Ref Range: 9.2 - 12.9 fL 9.1 (L)   Gran% Latest Ref Range: 38.0 - 73.0 % 67.4   Gran # Latest Ref Range: 1.8 - 7.7 K/uL 6.9   Lymph% Latest Ref Range: 18.0 - 48.0 % 18.2   Lymph # Latest Ref Range: 1.0 - 4.8 K/uL 1.9   Mono% Latest Ref Range: 4.0 - 15.0 % 7.9   Mono # Latest Ref Range: 0.3 - 1.0 K/uL 0.8   Eosinophil% Latest Ref Range: 0.0 - 8.0 % 5.3   Eos # Latest Ref Range: 0.0 - 0.5 K/uL 0.5   Basophil% Latest Ref Range: 0.0 - 1.9 % 1.2   Baso # Latest Ref Range: 0.00 - 0.20 K/uL 0.10   Protime Latest Ref Range: 9.7 - 11.8 sec 10.2   Coumadin Monitoring INR Latest Ref Range: 0.8 - 1.2  1.0     PFT results reviewed fev1 50 % at 1.53 with no bd response 12/15/2017      CONCLUSIONS     1 - Concentric remodeling.     2 - No wall motion abnormalities.     3 - Normal left ventricular systolic function (EF 65-70%).     4 - Impaired LV relaxation, normal LAP (grade 1 diastolic dysfunction).     5 - Normal right ventricular systolic function .   This document has been electronically    SIGNED BY: Benjamin Brock MD On: 12/15/2017 16:08    Plan:  Clinical impression is resonably certain and repeated evaluation prn +/- follow up will be needed as below.    Madeleine was seen today for follow-up,  copd and shortness of breath.    Diagnoses and all orders for this visit:    Steroid-dependent chronic obstructive pulmonary disease    Acute exacerbation of COPD with asthma        Follow-up in about 3 months (around 7/5/2018), or if symptoms worsen or fail to improve.    Discussed with patient above for education the following:      Patient Instructions   You have eosinophilic asthma and copd.  Your lung capacity is about 50%.      special shot should allow lungs to do better with decreasing steroids.  Shot increases shingles risk.    Prednisone 5 mg daily - try to go to 10 mg daily now for 2 weeks then 1 daily.  Get shingle vaccine once on prednisone 5 mg daily.    trelegy has breo and incurse - would recommend just trelegy when breo/incruse out

## 2018-04-06 ENCOUNTER — TELEPHONE (OUTPATIENT)
Dept: PHARMACY | Facility: HOSPITAL | Age: 68
End: 2018-04-06

## 2018-04-06 NOTE — TELEPHONE ENCOUNTER
Cindy- Oses[facility] received a prescription for Fasenra. We are currently working on this prescription. Will call the patient with an update.

## 2018-04-16 NOTE — TELEPHONE ENCOUNTER
DOCUMENTATION ONLY:  Prior authorization for Fasenra approved from 04/01/2018 to 04/14/2019    Case Id: [id number]    Co-pay: $1955.92    Patient Assistance IS required and is being researched.   VIVEK

## 2018-06-21 NOTE — TELEPHONE ENCOUNTER
[facility] has been attempting to reach Mr. Nowak regarding prescription for Fasenra.    After numerous unsuccessful attempts we sent a postcard to the address on file. At this point in time, no further contact will be made from [facility] until patient responds to our mail correspondence.    If there is anything else we can do to further assist, please let us know.     Thank you,  Noreen Gonzalez, PharmD  Specialty Pharmacy Clinical Pharmacist  [facility]  P: [phone]

## 2018-07-05 ENCOUNTER — OFFICE VISIT (OUTPATIENT)
Dept: PULMONOLOGY | Facility: CLINIC | Age: 68
End: 2018-07-05
Payer: MEDICARE

## 2018-07-05 VITALS
SYSTOLIC BLOOD PRESSURE: 137 MMHG | WEIGHT: 174.81 LBS | HEART RATE: 98 BPM | HEIGHT: 67 IN | DIASTOLIC BLOOD PRESSURE: 65 MMHG | OXYGEN SATURATION: 96 % | BODY MASS INDEX: 27.44 KG/M2

## 2018-07-05 DIAGNOSIS — J44.1 ACUTE EXACERBATION OF COPD WITH ASTHMA: ICD-10-CM

## 2018-07-05 DIAGNOSIS — J45.901 ACUTE EXACERBATION OF COPD WITH ASTHMA: ICD-10-CM

## 2018-07-05 DIAGNOSIS — Z92.241 STEROID-DEPENDENT CHRONIC OBSTRUCTIVE PULMONARY DISEASE: ICD-10-CM

## 2018-07-05 DIAGNOSIS — Z79.52 CURRENT CHRONIC USE OF SYSTEMIC STEROIDS: ICD-10-CM

## 2018-07-05 DIAGNOSIS — Z77.090 ASBESTOS EXPOSURE: ICD-10-CM

## 2018-07-05 DIAGNOSIS — J44.9 STEROID-DEPENDENT CHRONIC OBSTRUCTIVE PULMONARY DISEASE: ICD-10-CM

## 2018-07-05 DIAGNOSIS — J82.83 EOSINOPHILIC ASTHMA: Primary | ICD-10-CM

## 2018-07-05 PROCEDURE — 99214 OFFICE O/P EST MOD 30 MIN: CPT | Mod: PBBFAC,PO | Performed by: INTERNAL MEDICINE

## 2018-07-05 PROCEDURE — 99999 PR PBB SHADOW E&M-EST. PATIENT-LVL IV: CPT | Mod: PBBFAC,,, | Performed by: INTERNAL MEDICINE

## 2018-07-05 PROCEDURE — 99214 OFFICE O/P EST MOD 30 MIN: CPT | Mod: S$PBB,,, | Performed by: INTERNAL MEDICINE

## 2018-07-05 RX ORDER — ALBUTEROL SULFATE 0.83 MG/ML
2.5 SOLUTION RESPIRATORY (INHALATION) EVERY 6 HOURS PRN
Qty: 360 EACH | Refills: 3 | Status: SHIPPED | OUTPATIENT
Start: 2018-07-05 | End: 2018-11-12 | Stop reason: ALTCHOICE

## 2018-07-05 RX ORDER — POTASSIUM CHLORIDE 750 MG/1
TABLET, EXTENDED RELEASE ORAL
Status: ON HOLD | COMMUNITY
Start: 2018-06-22 | End: 2020-05-06 | Stop reason: HOSPADM

## 2018-07-05 RX ORDER — ALBUTEROL SULFATE 90 UG/1
AEROSOL, METERED RESPIRATORY (INHALATION)
Qty: 1 INHALER | Refills: 11 | Status: SHIPPED | OUTPATIENT
Start: 2018-07-05 | End: 2019-03-20 | Stop reason: SDUPTHER

## 2018-07-05 RX ORDER — PREDNISONE 5 MG/1
10 TABLET ORAL DAILY
Qty: 60 TABLET | Refills: 11 | Status: SHIPPED | OUTPATIENT
Start: 2018-07-05 | End: 2019-03-20 | Stop reason: SDUPTHER

## 2018-07-05 RX ORDER — LOSARTAN POTASSIUM 25 MG/1
TABLET ORAL
Status: ON HOLD | COMMUNITY
Start: 2018-05-25 | End: 2020-05-06 | Stop reason: HOSPADM

## 2018-07-05 RX ORDER — FLUTICASONE PROPIONATE AND SALMETEROL 500; 50 UG/1; UG/1
1 POWDER RESPIRATORY (INHALATION) 2 TIMES DAILY
Qty: 60 EACH | Refills: 11 | Status: SHIPPED | OUTPATIENT
Start: 2018-07-05 | End: 2018-09-20 | Stop reason: SDUPTHER

## 2018-07-05 RX ORDER — FUROSEMIDE 20 MG/1
TABLET ORAL
Status: ON HOLD | COMMUNITY
Start: 2018-05-21 | End: 2020-05-06 | Stop reason: HOSPADM

## 2018-07-05 RX ORDER — PREDNISONE 20 MG/1
TABLET ORAL
Qty: 42 TABLET | Refills: 1 | Status: SHIPPED | OUTPATIENT
Start: 2018-07-05 | End: 2018-11-07 | Stop reason: SDUPTHER

## 2018-07-05 NOTE — PATIENT INSTRUCTIONS
Need to get special shots to control lungs.     Increase prednisone to 10/d using 20 mg boost as needed as before

## 2018-07-05 NOTE — PROGRESS NOTES
7/5/2018    [facility] Follow Up    Chief Complaint   Patient presents with    Follow-up       HPI:   July 5, 2018- on prednisone 5/d with 20/d up to 3 weekly.  Was in hospital December.  Breathing worse, saw asbestos  and advised 2nd opinion, to see Dr Rodriguez soon.  No fasenra as pharmacy unable to contact pt. Pt ues prednisone 5/d and not using trelegy- has one puff left on 14 day sample given in April and relates trelegy not seems to help.  April 5 - increase prednisone from 5/d to 20/d since last yr - breathing still poor.  Rest good - but not better on prednisone 20/d.  No big improvement on incruse. Sinus better on flonase.      12/28/2017 pt very active, bikes to work with tool boxes with no problem. Had had hospital stays every 6 months til started prednisone continiously  5 yrs ago.  Pt recently flared with recent NSR stay. Pt had pft with fev1 50% at 1.53 liter 12/15/2017.  Very mahmood still.  On breo and singulair.  Use spiriva in past.     From consult last month:Plan:   Pt has eosinophillic asthma and copd.  Pt should respond to singulair.  Higher dose steroid action plan needed.  He would likely do very well with il5 rx.     outpt soon on prednisone 60/d x 7 with taper, going to breo 200, and singulair would be good with current rx.  Would not strop prednisone.  outpt f/u recommended. I do no see asbestos complications.  History of Present Illness:  Cc is sob x 3 days.   Pt worked refinery as .  Stopped smoking yrs ago. FH + asthma with no prior h/o asthma.  Has had cough and wheezes and sob going back prior to [place].  Pt noted needed freq steroids so was kept on prednisone 5 mg daily with need to increase dose ever 2-3 months because of increase sob.  Pt uses breo 100 and xopenex works better than ventolin.  Pt has chr nasal drainage but no bad infections.  abx not used.  He denies spiriva helped in past and denies using daliresp.    Pt rides bike to work - bike  "weighs 300 lbs with tools for trade.  Pt very active and still works - lives with daughter.            The chief compliant  problem is varies with instablilty at time   PFSH:  Past Medical History:   Diagnosis Date    Bronchitis     COPD (chronic obstructive pulmonary disease)          Past Surgical History:   Procedure Laterality Date    HERNIA REPAIR      WRIST SURGERY       Social History   Substance Use Topics    Smoking status: Former Smoker     Packs/day: 2.00     Years: 40.00     Quit date: 3/24/2009    Smokeless tobacco: Never Used    Alcohol use Yes      Comment: 6-12 beers/night; some days none.     Family History   Problem Relation Age of Onset    Cancer Mother     Asthma Mother     Heart disease Father     COPD Father     Cancer Father     Cancer Brother         colon cancer    No Known Problems Brother     No Known Problems Brother      Review of patient's allergies indicates:  No Known Allergies    Performance Status:The patient's activity level is functions out of house.      Review of Systems:  a review of eleven systems covering constitutional, Eye, HEENT, Psych, Respiratory, Cardiac, GI, , Musculoskeletal, Endocrine, Dermatologic was negative except for pertinent findings as listed ABOVE and below: all good save above.     Exam:Comprehensive exam done. /65 (BP Location: Left arm, Patient Position: Sitting)   Pulse 98   Ht 5' 7" (1.702 m)   Wt 79.3 kg (174 lb 13.2 oz)   SpO2 96%   BMI 27.38 kg/m²   Exam included Vitals as listed, and patient's appearance and affect and alertness and mood, oral exam for yeast and hygiene and pharynx lesions and Mallapatti (M) score, neck with inspection for jvd and masses and thyroid abnormalities and lymph nodes (supraclavicular and infraclavicular nodes and axillary also examined and noted if abn), chest exam included symmetry and effort and fremitus and percussion and auscultation, cardiac exam included rhythm and gallops and murmur and " rubs and jvd and edema, abdominal exam for mass and hepatosplenomegaly and tenderness and hernias and bowel sounds, Musculoskeletal exam with muscle tone and posture and mobility/gait and  strength, and skin for rashes and cyanosis and pallor and turgor, extremity for clubbing.  Findings were normal except for pertinent findings listed below:  M3, good exam - sl rales post lung bases. Sl cushingnoid    Radiographs (ct chest and cxr) reviewed: view by direct vision  Ct chest dec 2017 with min pleural plaque right and bilat emphysema.    Labs reviewed       Results for MADELEINE EVANS (MRN [id number]) as of 12/28/2017 14:15   Ref. Range 12/11/2017 05:14   WBC Latest Ref Range: 3.90 - 12.70 K/uL 10.30   RBC Latest Ref Range: 4.60 - 6.20 M/uL 4.66   Hemoglobin Latest Ref Range: 14.0 - 18.0 g/dL 14.5   Hematocrit Latest Ref Range: 40.0 - 54.0 % 44.0   MCV Latest Ref Range: 82 - 98 fL 94   MCH Latest Ref Range: 27.0 - 31.0 pg 31.1 (H)   MCHC Latest Ref Range: 32.0 - 36.0 g/dL 33.0   RDW Latest Ref Range: 11.5 - 14.5 % 13.0   Platelets Latest Ref Range: 150 - 350 K/uL 295   MPV Latest Ref Range: 9.2 - 12.9 fL 9.1 (L)   Gran% Latest Ref Range: 38.0 - 73.0 % 67.4   Gran # Latest Ref Range: 1.8 - 7.7 K/uL 6.9   Lymph% Latest Ref Range: 18.0 - 48.0 % 18.2   Lymph # Latest Ref Range: 1.0 - 4.8 K/uL 1.9   Mono% Latest Ref Range: 4.0 - 15.0 % 7.9   Mono # Latest Ref Range: 0.3 - 1.0 K/uL 0.8   Eosinophil% Latest Ref Range: 0.0 - 8.0 % 5.3   Eos # Latest Ref Range: 0.0 - 0.5 K/uL 0.5   Basophil% Latest Ref Range: 0.0 - 1.9 % 1.2   Baso # Latest Ref Range: 0.00 - 0.20 K/uL 0.10   Protime Latest Ref Range: 9.7 - 11.8 sec 10.2   Coumadin Monitoring INR Latest Ref Range: 0.8 - 1.2  1.0     PFT results reviewed fev1 50 % at 1.53 with no bd response 12/15/2017      CONCLUSIONS     1 - Concentric remodeling.     2 - No wall motion abnormalities.     3 - Normal left ventricular systolic function (EF 65-70%).     4 - Impaired LV  relaxation, normal LAP (grade 1 diastolic dysfunction).     5 - Normal right ventricular systolic function .   This document has been electronically    SIGNED BY: Benjamin Brock MD On: 12/15/2017 16:08    Plan:  Clinical impression is resonably certain and repeated evaluation prn +/- follow up will be needed as below.    Brad was seen today for follow-up.    Diagnoses and all orders for this visit:    Eosinophilic asthma  -     albuterol (PROVENTIL) 2.5 mg /3 mL (0.083 %) nebulizer solution; Take 3 mLs (2.5 mg total) by nebulization every 6 (six) hours as needed for Wheezing.  -     albuterol 90 mcg/actuation inhaler; 2 puffs every 4 hours as needed for cough, wheeze, or shortness of breath  -     fluticasone-salmeterol 500-50 mcg/dose (ADVAIR DISKUS) 500-50 mcg/dose DsDv diskus inhaler; Inhale 1 puff into the lungs 2 (two) times daily. Controller    Asbestos exposure    Steroid-dependent chronic obstructive pulmonary disease  -     albuterol (PROVENTIL) 2.5 mg /3 mL (0.083 %) nebulizer solution; Take 3 mLs (2.5 mg total) by nebulization every 6 (six) hours as needed for Wheezing.  -     albuterol 90 mcg/actuation inhaler; 2 puffs every 4 hours as needed for cough, wheeze, or shortness of breath  -     fluticasone-salmeterol 500-50 mcg/dose (ADVAIR DISKUS) 500-50 mcg/dose DsDv diskus inhaler; Inhale 1 puff into the lungs 2 (two) times daily. Controller  -     predniSONE (DELTASONE) 20 MG tablet; Take 3 daily for 7 days then 2 daily for 7 days then 1 daily for 7 days.  -     predniSONE (DELTASONE) 5 MG tablet; Take 2 tablets (10 mg total) by mouth once daily.    Current chronic use of systemic steroids  -     albuterol (PROVENTIL) 2.5 mg /3 mL (0.083 %) nebulizer solution; Take 3 mLs (2.5 mg total) by nebulization every 6 (six) hours as needed for Wheezing.  -     albuterol 90 mcg/actuation inhaler; 2 puffs every 4 hours as needed for cough, wheeze, or shortness of breath  -     fluticasone-salmeterol 500-50  mcg/dose (ADVAIR DISKUS) 500-50 mcg/dose DsDv diskus inhaler; Inhale 1 puff into the lungs 2 (two) times daily. Controller    Acute exacerbation of COPD with asthma  -     predniSONE (DELTASONE) 20 MG tablet; Take 3 daily for 7 days then 2 daily for 7 days then 1 daily for 7 days.  -     predniSONE (DELTASONE) 5 MG tablet; Take 2 tablets (10 mg total) by mouth once daily.        Follow-up in about 3 months (around 10/5/2018), or if symptoms worsen or fail to improve.    Discussed with patient above for education the following:      Patient Instructions   Need to get special shots to control lungs.     Increase prednisone to 10/d using 20 mg boost as needed as before

## 2018-08-21 ENCOUNTER — OFFICE VISIT (OUTPATIENT)
Dept: PULMONOLOGY | Facility: CLINIC | Age: 68
End: 2018-08-21
Payer: MEDICARE

## 2018-08-21 VITALS
HEART RATE: 100 BPM | SYSTOLIC BLOOD PRESSURE: 110 MMHG | WEIGHT: 169 LBS | OXYGEN SATURATION: 98 % | HEIGHT: 67 IN | BODY MASS INDEX: 26.53 KG/M2 | DIASTOLIC BLOOD PRESSURE: 78 MMHG

## 2018-08-21 DIAGNOSIS — Z87.891 HISTORY OF PRIOR CIGARETTE SMOKING: ICD-10-CM

## 2018-08-21 DIAGNOSIS — J44.9 CHRONIC OBSTRUCTIVE PULMONARY DISEASE, UNSPECIFIED COPD TYPE: Primary | ICD-10-CM

## 2018-08-21 DIAGNOSIS — Z77.090 ASBESTOS EXPOSURE: ICD-10-CM

## 2018-08-21 DIAGNOSIS — J82.83 EOSINOPHILIC ASTHMA: ICD-10-CM

## 2018-08-21 PROCEDURE — 99214 OFFICE O/P EST MOD 30 MIN: CPT | Mod: ,,, | Performed by: INTERNAL MEDICINE

## 2018-08-21 NOTE — PROGRESS NOTES
New Office Visit/Consultation Note    Patient Name: Brad Nwoak  MRN: [id number]  : 1950      Reason for visit: Second opinion with regards to asbestosis    HPI:     2018 - Pt with known COPD, asthma (sees Dr Eddy) here for second opinion regarding asbestos lung disease.  Started working on [employer] in .  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  He started working as a  in  until present time and reports that he has had asbestos exposure.  He denies working directly with asbestos but did work in the vicinity.  He reports that Dr Eddy thinks he has asbestos issues.  Has had PFT but doesn't remember when.  He has chronic SOB, SHOEMAKER (being addressed by Dr Eddy)  He only uses prn inhalers.  Has some chronic cough and wheezing.  Feels that SOB is worse but he rides bike around at work (weights about 200#).    Past Medical History    Past Medical History:   Diagnosis Date    Bronchitis     COPD (chronic obstructive pulmonary disease)        Past Surgical History    Past Surgical History:   Procedure Laterality Date    HERNIA REPAIR      WRIST SURGERY         Medications      Current Outpatient Medications:     albuterol (ACCUNEB) 1.25 mg/3 mL Nebu, Take 1.25 mg by nebulization every 6 (six) hours as needed. Rescue, Disp: , Rfl:     albuterol (PROVENTIL) 2.5 mg /3 mL (0.083 %) nebulizer solution, Take 3 mLs (2.5 mg total) by nebulization every 6 (six) hours as needed for Wheezing., Disp: 360 each, Rfl: 3    albuterol 90 mcg/actuation inhaler, 2 puffs every 4 hours as needed for cough, wheeze, or shortness of breath, Disp: 1 Inhaler, Rfl: 11    ALBUTEROL SULFATE (VENTOLIN INHL), Inhale into the lungs., Disp: , Rfl:     aspirin 81 MG Chew, Take 1 tablet (81 mg total) by mouth once daily., Disp: , Rfl:     atorvastatin (LIPITOR) 20 MG tablet, , Disp: , Rfl:      benralizumab 30 mg/mL Syrg, Inject 30 mg into the skin every 30 days. Dose every 28 days x 3 then every 56 days, Disp: 1 Syringe, Rfl: 11    calcium citrate (CALCITRATE) 200 mg (950 mg) tablet, Take 1 tablet by mouth once daily., Disp: , Rfl:     cyclobenzaprine (FLEXERIL) 10 MG tablet, , Disp: , Rfl:     fluticasone (FLONASE) 50 mcg/actuation nasal spray, 1 spray by Each Nare route once daily., Disp: 1 Bottle, Rfl: 11    fluticasone-umeclidin-vilanter (TRELEGY ELLIPTA) 100-62.5-25 mcg DsDv, Inhale 1 puff into the lungs once daily., Disp: 1 each, Rfl: 11    furosemide (LASIX) 20 MG tablet, , Disp: , Rfl:     hydrocodone-chlorpheniramine (TUSSIONEX) 10-8 mg/5 mL suspension, Take 5 mLs by mouth every 12 (twelve) hours as needed for Cough., Disp: 115 mL, Rfl: 0    KLOR-CON M10 10 mEq tablet, , Disp: , Rfl:     losartan (COZAAR) 25 MG tablet, , Disp: , Rfl:     montelukast (SINGULAIR) 10 mg tablet, Take 1 tablet (10 mg total) by mouth every evening., Disp: 30 tablet, Rfl: 11    predniSONE (DELTASONE) 20 MG tablet, Take 3 daily for 7 days then 2 daily for 7 days then 1 daily for 7 days., Disp: 42 tablet, Rfl: 1    predniSONE (DELTASONE) 5 MG tablet, Take 2 tablets (10 mg total) by mouth once daily., Disp: 60 tablet, Rfl: 11    fluticasone-salmeterol 500-50 mcg/dose (ADVAIR DISKUS) 500-50 mcg/dose DsDv diskus inhaler, Inhale 1 puff into the lungs 2 (two) times daily. Controller, Disp: 60 each, Rfl: 11    Allergies    Review of patient's allergies indicates:  No Known Allergies    SocHx    Social History     Tobacco Use   Smoking Status Former Smoker    Packs/day: 2.00    Years: 40.00    Pack years: 80.00    Last attempt to quit: 3/24/2009    Years since quittin.4   Smokeless Tobacco Never Used       Social History     Substance and Sexual Activity   Alcohol Use Yes    Comment: 6-12 beers/night; some days none.       Drug Use - no  Occupation -   Asbestos exposure - +  Pets - 2  "dogs    FMHx    Family History   Problem Relation Age of Onset    Cancer Mother     Asthma Mother     Heart disease Father     COPD Father     Cancer Father     Cancer Brother         colon cancer    No Known Problems Brother     No Known Problems Brother          Review of Systems  Review of Systems   Constitutional: Positive for malaise/fatigue. Negative for chills, diaphoresis, fever and weight loss.   HENT: Negative for congestion.    Eyes: Negative for pain.   Respiratory: Positive for cough, shortness of breath and wheezing. Negative for hemoptysis, sputum production and stridor.    Cardiovascular: Negative for chest pain, palpitations, orthopnea, claudication, leg swelling and PND.   Gastrointestinal: Negative for abdominal pain, constipation, diarrhea, heartburn, nausea and vomiting.   Genitourinary: Negative for dysuria, frequency and urgency.   Musculoskeletal: Negative for falls and myalgias.   Neurological: Negative for sensory change, focal weakness and weakness.   Psychiatric/Behavioral: Negative for depression. The patient is not nervous/anxious.        Physical Exam    Vitals:    08/21/18 0927   BP: 110/78   Pulse: 100   SpO2: 98%   Weight: 76.7 kg (169 lb)   Height: 5' 7" (1.702 m)       Physical Exam   Constitutional: He is oriented to person, place, and time. He appears well-developed and well-nourished. No distress.   HENT:   Head: Normocephalic and atraumatic.   Right Ear: External ear normal.   Left Ear: External ear normal.   Nose: Nose normal.   Mouth/Throat: Oropharynx is clear and moist.   Poor dentition   Eyes: EOM are normal. Pupils are equal, round, and reactive to light.   Neck: Normal range of motion. Neck supple. No JVD present. No tracheal deviation present. No thyromegaly present.   Cardiovascular: Normal rate, regular rhythm, normal heart sounds and intact distal pulses. Exam reveals no gallop and no friction rub.   No murmur heard.  Pulmonary/Chest: Effort normal and " breath sounds normal. No stridor. No respiratory distress. He has no wheezes. He has no rales. He exhibits no tenderness.   Abdominal: Soft. Bowel sounds are normal. He exhibits no distension.   Musculoskeletal: Normal range of motion. He exhibits no edema or tenderness.   Lymphadenopathy:     He has no cervical adenopathy.   Neurological: He is alert and oriented to person, place, and time. He has normal reflexes. No cranial nerve deficit.   Skin: He is not diaphoretic.   Psychiatric: He has a normal mood and affect. His behavior is normal.   Nursing note and vitals reviewed.      Labs    Lab Results   Component Value Date    WBC 14.20 (H) 12/17/2017    HGB 12.5 (L) 12/17/2017    HCT 37.1 (L) 12/17/2017     12/17/2017       Sodium   Date Value Ref Range Status   12/17/2017 144 136 - 145 mmol/L Final     Potassium   Date Value Ref Range Status   12/17/2017 4.3 3.5 - 5.1 mmol/L Final     Chloride   Date Value Ref Range Status   12/17/2017 107 95 - 110 mmol/L Final     CO2   Date Value Ref Range Status   12/17/2017 30 (H) 23 - 29 mmol/L Final     Glucose   Date Value Ref Range Status   12/17/2017 101 70 - 110 mg/dL Final     BUN, Bld   Date Value Ref Range Status   12/17/2017 21 8 - 23 mg/dL Final     Creatinine   Date Value Ref Range Status   12/17/2017 0.7 0.5 - 1.4 mg/dL Final     Calcium   Date Value Ref Range Status   12/17/2017 8.6 (L) 8.7 - 10.5 mg/dL Final     Total Protein   Date Value Ref Range Status   12/11/2017 7.3 6.5 - 8.1 g/dL Final     Albumin   Date Value Ref Range Status   12/11/2017 4.1 3.5 - 5.0 g/dL Final     Total Bilirubin   Date Value Ref Range Status   12/11/2017 0.8 mg/dL Final     Comment:     For infants and newborns, interpretation of results should be based  on gestational age, weight and in agreement with clinical  observations.  Premature Infant recommended reference ranges:  Up to 24 hours.............<8.0 mg/dL  Up to 48 hours............<12.0 mg/dL  3-5  days..................<15.0 mg/dL  6-29 days.................<15.0 mg/dL       Alkaline Phosphatase   Date Value Ref Range Status   12/11/2017 59 38 - 126 U/L Final     AST   Date Value Ref Range Status   12/11/2017 26 15 - 41 U/L Final     ALT   Date Value Ref Range Status   12/11/2017 23 17 - 63 U/L Final     Anion Gap   Date Value Ref Range Status   12/17/2017 7 (L) 8 - 16 mmol/L Final       Xrays    Technique: CT scans of the chest with IV contrast, 100 cc Omniview 350. Multiplanar and MIP reconstructions.    FINDINGS: Comparison to previous examination of August 2014.  Lungs show centrilobular emphysema, no consolidation or mass.  No pleural effusions.  Airways are intact, showing bronchial wall thickening.  The heart size is normal.  No pericardial effusion.      No CT evidence for central or apparent peripheral pulmonary arterial thromboembolus. No aortic dissection or aneurysm identified.    Trachea, thyroid are  unremarkable.  Redemonstration of mild distal esophageal circumferential thickening is associated with reflux.  Mild mediastinal adenopathy redemonstrated.    Limited upper abdominal sections are unremarkable.     Bones are intact.  No chest wall mass.  =======================================================================      Impression           1. No CT evidence for central or apparent peripheral pulmonary thromboembolus.     2. No aortic dissection or aneurysm identified.       3.  Centrilobular emphysema with bronchial wall thickening.        Phone report to Krishna Martinez M.D.          Impression/Plan    Problem List Items Addressed This Visit        Pulmonary    COPD (chronic obstructive pulmonary disease) - Primary    Eosinophilic asthma  - sees Dr Eddy who is managing this  - should be on a controlling medication ( has RX for TRELEGY)  - being evaluated for anti IL-5 treatment  - further care per Dr Eddy       Other    History of prior cigarette smoking  - aware  - has quit       Asbestos exposure  - as above  - will locate PFT to review          I have spent about 30 minutes with the patient taking the history and examining the patient.  We have discussed the diagnoses and current plan and all questions have been answered.  We have discussed the follow up plan.  The patient and family (if present) know to contact the office with any questions they may have.        Loy Blackburn MD

## 2018-09-20 ENCOUNTER — OFFICE VISIT (OUTPATIENT)
Dept: PULMONOLOGY | Facility: CLINIC | Age: 68
End: 2018-09-20
Payer: MEDICARE

## 2018-09-20 VITALS
WEIGHT: 173.94 LBS | DIASTOLIC BLOOD PRESSURE: 67 MMHG | SYSTOLIC BLOOD PRESSURE: 128 MMHG | HEIGHT: 67 IN | HEART RATE: 90 BPM | BODY MASS INDEX: 27.3 KG/M2 | OXYGEN SATURATION: 95 %

## 2018-09-20 DIAGNOSIS — Z79.52 CURRENT CHRONIC USE OF SYSTEMIC STEROIDS: ICD-10-CM

## 2018-09-20 DIAGNOSIS — Z92.241 STEROID-DEPENDENT CHRONIC OBSTRUCTIVE PULMONARY DISEASE: ICD-10-CM

## 2018-09-20 DIAGNOSIS — J44.9 CHRONIC OBSTRUCTIVE PULMONARY DISEASE, UNSPECIFIED COPD TYPE: ICD-10-CM

## 2018-09-20 DIAGNOSIS — J82.83 EOSINOPHILIC ASTHMA: Primary | ICD-10-CM

## 2018-09-20 DIAGNOSIS — J44.9 STEROID-DEPENDENT CHRONIC OBSTRUCTIVE PULMONARY DISEASE: ICD-10-CM

## 2018-09-20 DIAGNOSIS — J20.9 BRONCHITIS WITH BRONCHOSPASM: ICD-10-CM

## 2018-09-20 PROCEDURE — 99213 OFFICE O/P EST LOW 20 MIN: CPT | Mod: PBBFAC,PO | Performed by: NURSE PRACTITIONER

## 2018-09-20 PROCEDURE — 99999 PR PBB SHADOW E&M-EST. PATIENT-LVL III: CPT | Mod: PBBFAC,,, | Performed by: NURSE PRACTITIONER

## 2018-09-20 PROCEDURE — 99213 OFFICE O/P EST LOW 20 MIN: CPT | Mod: S$PBB,,, | Performed by: NURSE PRACTITIONER

## 2018-09-20 RX ORDER — MONTELUKAST SODIUM 10 MG/1
10 TABLET ORAL NIGHTLY
Qty: 30 TABLET | Refills: 11 | Status: ON HOLD | OUTPATIENT
Start: 2018-09-20 | End: 2020-05-06 | Stop reason: HOSPADM

## 2018-09-20 RX ORDER — FLUTICASONE PROPIONATE 50 MCG
1 SPRAY, SUSPENSION (ML) NASAL DAILY
Qty: 1 BOTTLE | Refills: 11 | Status: SHIPPED | OUTPATIENT
Start: 2018-09-20 | End: 2019-09-20 | Stop reason: SDUPTHER

## 2018-09-20 RX ORDER — FLUTICASONE PROPIONATE AND SALMETEROL 500; 50 UG/1; UG/1
1 POWDER RESPIRATORY (INHALATION) 2 TIMES DAILY
Qty: 60 EACH | Refills: 11 | Status: SHIPPED | OUTPATIENT
Start: 2018-09-20 | End: 2018-11-09

## 2018-09-20 NOTE — PATIENT INSTRUCTIONS
Eosinophilic Asthma  Try to contact pharmacy about setting up [facility]     Go to [employer] and get assistance from employee to set up voicemail and how to listen to messages: when pharmacy calls you will have a message and return phone number.    COPD/Asthma  Continue current prednisone treatment  Continue current medications   Advair Diskus  Singulair  xopenex rescue inhaler

## 2018-09-20 NOTE — PROGRESS NOTES
9/20/2018    Brad Nowak  Office Note  Established Patient of Dr. Eddy. Patient is new to me.    Chief Complaint   Patient presents with    Cough    Shortness of Breath    COPD       HPI: pt presents in clinic alone, states taking prednisone 10 mg daily and 20 mg daily twice a week when SOB, has taken 40 mg a day once last week. Uses rescue inhaler most days after climbing stairs at work. Has trouble sleeping. Discussed [facility] and pharmacy unable to contact patient. States he does not carry phone at work during day, does not answer numbers he does not know, and has no voicemail set up at moment. States using Advair Diskus daily. Cough every few days, non productive. Worse at night.    HPI:   July 5, 2018- on prednisone 5/d with 20/d up to 3 weekly.  Was in hospital December.  Breathing worse, saw asbestos  and advised 2nd opinion, to see Dr Rodriguez soon.  No [facility] as pharmacy unable to contact pt. Pt ues prednisone 5/d and not using trelegy- has one puff left on 14 day sample given in April and relates trelegy not seems to help.  April 5 - increase prednisone from 5/d to 20/d since last yr - breathing still poor.  Rest good - but not better on prednisone 20/d.  No big improvement on incruse. Sinus better on flonase.        12/28/2017 pt very active, bikes to work with tool boxes with no problem. Had had hospital stays every 6 months til started prednisone continiously  5 yrs ago.  Pt recently flared with recent NSR stay. Pt had pft with fev1 50% at 1.53 liter 12/15/2017.  Very mahmood still.  On breo and singulair.  Use spiriva in past.     From consult last month:Plan:   Pt has eosinophillic asthma and copd.  Pt should respond to singulair.  Higher dose steroid action plan needed.  He would likely do very well with il5 rx.     outpt soon on prednisone 60/d x 7 with taper, going to breo 200, and singulair would be good with current rx.  Would not strop prednisone.  outpt f/u recommended. I do no see  asbestos complications.  History of Present Illness:  Cc is sob x 3 days.   Pt worked refinery as .  Stopped smoking yrs ago. FH + asthma with no prior h/o asthma.  Has had cough and wheezes and sob going back prior to [place].  Pt noted needed freq steroids so was kept on prednisone 5 mg daily with need to increase dose ever 2-3 months because of increase sob.  Pt uses breo 100 and xopenex works better than ventolin.  Pt has chr nasal drainage but no bad infections.  abx not used.  He denies spiriva helped in past and denies using daliresp.    Pt rides bike to work - bike weighs 300 lbs with tools for trade.  Pt very active and still works - lives with daughter.    Reviewed note    HPI:      8/21/2018 - Pt with known COPD, asthma (sees Dr Eddy) here for second opinion regarding asbestos lung disease.  Started working on [employer] in 1968.  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  He started working as a  in 1987 until present time and reports that he has had asbestos exposure.  He denies working directly with asbestos but did work in the vicinity.  He reports that Dr Eddy thinks he has asbestos issues.  Has had PFT but doesn't remember when.  He has chronic SOB, SHOEMAKER (being addressed by Dr Eddy)  He only uses prn inhalers.  Has some chronic cough and wheezing.  Feels that SOB is worse but he rides bike around at work (weights about 200#).        The chief compliant  problem is new to me  PFSH:  Past Medical History:   Diagnosis Date    Bronchitis     COPD (chronic obstructive pulmonary disease)          Past Surgical History:   Procedure Laterality Date    HERNIA REPAIR      WRIST SURGERY       Social History     Tobacco Use    Smoking status: Former Smoker     Packs/day: 2.00     Years: 40.00     Pack years: 80.00     Last attempt to quit: 3/24/2009      "Years since quittin.4    Smokeless tobacco: Never Used   Substance Use Topics    Alcohol use: Yes     Comment: 6-12 beers/night; some days none.    Drug use: No     Family History   Problem Relation Age of Onset    Cancer Mother     Asthma Mother     Heart disease Father     COPD Father     Cancer Father     Cancer Brother         colon cancer    No Known Problems Brother     No Known Problems Brother      Review of patient's allergies indicates:  No Known Allergies  I have reviewed past medical, family, and social history. I have reviewed previous nurse notes.    Performance Status:The patient's activity level is functions out of house.         Review of Systems   Constitutional: Negative for activity change, appetite change, chills, diaphoresis, fatigue, fever and unexpected weight change.   HENT: Negative for dental problem, postnasal drip, rhinorrhea, sinus pressure, sinus pain, sneezing, sore throat, trouble swallowing and voice change.    Respiratory: Negative for apnea,, chest tightness,  wheezing and stridor.  Positive cough, SOB  Cardiovascular: Negative for chest pain, palpitations and leg swelling.   Gastrointestinal: Negative for abdominal distention, abdominal pain, constipation and nausea.   Musculoskeletal: Negative for gait problem, myalgias and neck pain.   Skin: Negative for color change and pallor.   Allergic/Immunologic: Negative for environmental allergies and food allergies.   Neurological: Negative for dizziness, speech difficulty, weakness, light-headedness, numbness and headaches.   Hematological: Negative for adenopathy. Does not bruise/bleed easily.   Psychiatric/Behavioral: Negative for dysphoric mood. The patient is not nervous/anxious. Positive for sleep disturbance, difficult staying asleep.          Exam:Comprehensive exam done. /67 (BP Location: Right arm, Patient Position: Sitting)   Pulse 90   Ht 5' 7" (1.702 m)   Wt 78.9 kg (173 lb 15.1 oz)   SpO2 95% " Comment: on room air  BMI 27.24 kg/m²   Exam included Vitals as listed  Constitutional: He is oriented to person, place, and time. He appears well-developed. No distress.   Nose: Nose normal.   Mouth/Throat: Uvula is midline, oropharynx is clear and moist and mucous membranes are normal. No dental caries. No oropharyngeal exudate, posterior oropharyngeal edema, posterior oropharyngeal erythema or tonsillar abscesses.  Mallapatti (M) score III  Eyes: Pupils are equal, round, and reactive to light.   Neck: No JVD present. No thyromegaly present.   Cardiovascular: Normal rate, regular rhythm and normal heart sounds. Exam reveals no gallop and no friction rub.   No murmur heard.  Pulmonary/Chest: Effort normal and breath sounds normal. No accessory muscle usage or stridor. No apnea and no tachypnea. No respiratory distress. He has no decreased breath sounds. He has no wheezes. He has no rhonchi. He has no rales. He exhibits no tenderness.   Abdominal: Soft. He exhibits no mass. There is no tenderness. No hepatosplenomegaly, hernias and normoactive bowel sounds  Musculoskeletal: Normal range of motion. He exhibits no edema.   Lymphadenopathy:     He has no cervical adenopathy.     He has no axillary adenopathy.   Neurological: He is alert and oriented to person, place, and time. He is not disoriented.   Skin: Skin is warm and dry. Capillary refill takes less 2 sec. No cyanosis or erythema. No pallor. Nails show clubbing.   Psychiatric: He has a normal mood and affect. His behavior is normal. Judgment and thought content normal.       Radiographs (ct chest and cxr) reviewed: results reviewed CT and Chest X-ray 12/2017    Labs reviewed    Lab Results   Component Value Date    WBC 14.20 (H) 12/17/2017    RBC 3.93 (L) 12/17/2017    HGB 12.5 (L) 12/17/2017    HCT 37.1 (L) 12/17/2017    MCV 94 12/17/2017    MCH 31.7 (H) 12/17/2017    MCHC 33.6 12/17/2017    RDW 12.7 12/17/2017     12/17/2017    MPV 8.5 (L) 12/17/2017     GRAN 11.9 (H) 12/17/2017    GRAN 83.7 (H) 12/17/2017    LYMPH 1.4 12/17/2017    LYMPH 9.7 (L) 12/17/2017    MONO 0.9 12/17/2017    MONO 6.6 12/17/2017    EOS 0.0 12/17/2017    BASO 0.00 12/17/2017    EOSINOPHIL 0.0 12/17/2017    BASOPHIL 0.0 12/17/2017       PFT results reviewed  Pulmonary Functions Testing Results: from 12/2017      Plan:  Clinical impression is apparently straight forward and impression with management as below.    Brad was seen today for cough, shortness of breath and copd.    Diagnoses and all orders for this visit:    Eosinophilic asthma  -     montelukast (SINGULAIR) 10 mg tablet; Take 1 tablet (10 mg total) by mouth every evening.  -     fluticasone-salmeterol 500-50 mcg/dose (ADVAIR DISKUS) 500-50 mcg/dose DsDv diskus inhaler; Inhale 1 puff into the lungs 2 (two) times daily. Controller    Chronic obstructive pulmonary disease, unspecified COPD type  -     fluticasone (FLONASE) 50 mcg/actuation nasal spray; 1 spray (50 mcg total) by Each Nare route once daily.    Steroid-dependent chronic obstructive pulmonary disease  -     fluticasone-salmeterol 500-50 mcg/dose (ADVAIR DISKUS) 500-50 mcg/dose DsDv diskus inhaler; Inhale 1 puff into the lungs 2 (two) times daily. Controller  -     fluticasone (FLONASE) 50 mcg/actuation nasal spray; 1 spray (50 mcg total) by Each Nare route once daily.    Current chronic use of systemic steroids  -     fluticasone-salmeterol 500-50 mcg/dose (ADVAIR DISKUS) 500-50 mcg/dose DsDv diskus inhaler; Inhale 1 puff into the lungs 2 (two) times daily. Controller    Bronchitis with bronchospasm  -     fluticasone (FLONASE) 50 mcg/actuation nasal spray; 1 spray (50 mcg total) by Each Nare route once daily.        Follow-up in about 3 months (around 12/20/2018), or if symptoms worsen or fail to improve.    Discussed with patient above for education the following:      Patient Instructions   Eosinophilic Asthma  Try to contact pharmacy about setting up [facility]     Go  to [employer] and get assistance from employee to set up voicemail and how to listen to messages: when pharmacy calls you will have a message and return phone number.    COPD/Asthma  Continue current prednisone treatment  Continue current medications   Advair Diskus  Singulair  xopenex rescue inhaler

## 2018-11-07 DIAGNOSIS — J44.9 STEROID-DEPENDENT CHRONIC OBSTRUCTIVE PULMONARY DISEASE: ICD-10-CM

## 2018-11-07 DIAGNOSIS — J44.1 ACUTE EXACERBATION OF COPD WITH ASTHMA: ICD-10-CM

## 2018-11-07 DIAGNOSIS — J45.901 ACUTE EXACERBATION OF COPD WITH ASTHMA: ICD-10-CM

## 2018-11-07 DIAGNOSIS — Z92.241 STEROID-DEPENDENT CHRONIC OBSTRUCTIVE PULMONARY DISEASE: ICD-10-CM

## 2018-11-07 RX ORDER — PREDNISONE 20 MG/1
TABLET ORAL
Qty: 42 TABLET | Refills: 1 | Status: SHIPPED | OUTPATIENT
Start: 2018-11-07 | End: 2019-02-05 | Stop reason: SDUPTHER

## 2018-11-09 ENCOUNTER — OFFICE VISIT (OUTPATIENT)
Dept: PULMONOLOGY | Facility: CLINIC | Age: 68
End: 2018-11-09
Payer: MEDICARE

## 2018-11-09 VITALS
HEART RATE: 88 BPM | DIASTOLIC BLOOD PRESSURE: 78 MMHG | HEIGHT: 67 IN | SYSTOLIC BLOOD PRESSURE: 147 MMHG | WEIGHT: 173.94 LBS | BODY MASS INDEX: 27.3 KG/M2 | OXYGEN SATURATION: 95 %

## 2018-11-09 DIAGNOSIS — J44.1 COPD WITH ACUTE EXACERBATION: Primary | ICD-10-CM

## 2018-11-09 DIAGNOSIS — Z92.241 STEROID-DEPENDENT CHRONIC OBSTRUCTIVE PULMONARY DISEASE: ICD-10-CM

## 2018-11-09 DIAGNOSIS — J44.9 STEROID-DEPENDENT CHRONIC OBSTRUCTIVE PULMONARY DISEASE: ICD-10-CM

## 2018-11-09 PROCEDURE — 96372 THER/PROPH/DIAG INJ SC/IM: CPT | Mod: PBBFAC,PO

## 2018-11-09 PROCEDURE — 99213 OFFICE O/P EST LOW 20 MIN: CPT | Mod: S$PBB,,, | Performed by: NURSE PRACTITIONER

## 2018-11-09 PROCEDURE — 99213 OFFICE O/P EST LOW 20 MIN: CPT | Mod: PBBFAC,PO | Performed by: NURSE PRACTITIONER

## 2018-11-09 PROCEDURE — 99999 PR PBB SHADOW E&M-EST. PATIENT-LVL III: CPT | Mod: PBBFAC,,, | Performed by: NURSE PRACTITIONER

## 2018-11-09 RX ORDER — BETAMETHASONE SODIUM PHOSPHATE AND BETAMETHASONE ACETATE 3; 3 MG/ML; MG/ML
6 INJECTION, SUSPENSION INTRA-ARTICULAR; INTRALESIONAL; INTRAMUSCULAR; SOFT TISSUE ONCE
Status: COMPLETED | OUTPATIENT
Start: 2018-11-09 | End: 2018-11-09

## 2018-11-09 RX ORDER — ARFORMOTEROL TARTRATE 15 UG/2ML
15 SOLUTION RESPIRATORY (INHALATION) 2 TIMES DAILY
Qty: 360 ML | Refills: 3 | Status: SHIPPED | OUTPATIENT
Start: 2018-11-09 | End: 2018-11-12

## 2018-11-09 RX ORDER — BUDESONIDE 0.5 MG/2ML
0.5 INHALANT ORAL DAILY
Qty: 180 ML | Refills: 3 | Status: SHIPPED | OUTPATIENT
Start: 2018-11-09 | End: 2018-11-12

## 2018-11-09 RX ORDER — AMOXICILLIN 875 MG/1
875 TABLET, FILM COATED ORAL 2 TIMES DAILY
Qty: 10 TABLET | Refills: 0 | Status: SHIPPED | OUTPATIENT
Start: 2018-11-09 | End: 2018-11-14

## 2018-11-09 RX ADMIN — BETAMETHASONE SODIUM PHOSPHATE AND BETAMETHASONE ACETATE 6 MG: 3; 3 INJECTION, SUSPENSION INTRA-ARTICULAR; INTRALESIONAL; INTRAMUSCULAR at 09:11

## 2018-11-09 NOTE — PROGRESS NOTES
11/9/2018    Brad Nowak  Office Note    Chief Complaint   Patient presents with    Follow-up    Shortness of Breath       HPI:ER at [place] for chest pain, chest tightness, elevated pulse rate of 130, Discharged  Dx: COPD exacerbation, treated with albuterol nebulizer. States Advair cost to high, $183 month, States recently retired.   Using Duo neb 2x daily, 2-3 albuterol inhaler daily.    9/20/18- pt presents in clinic alone, states taking prednisone 10 mg daily and 20 mg daily twice a week when SOB, has taken 40 mg a day once last week. Uses rescue inhaler most days after climbing stairs at work. Has trouble sleeping. Discussed fasenra and pharmacy unable to contact patient. States he does not carry phone at work during day, does not answer numbers he does not know, and has no voicemail set up at moment. States using Advair Diskus daily. Cough every few days, non productive. Worse at night.      July 5, 2018- on prednisone 5/d with 20/d up to 3 weekly.  Was in hospital December.  Breathing worse, saw asbestos  and advised 2nd opinion, to see Dr Rodriguez soon.  No fasenra as pharmacy unable to contact pt. Pt ues prednisone 5/d and not using trelegy- has one puff left on 14 day sample given in April and relates trelegy not seems to help.  April 5 - increase prednisone from 5/d to 20/d since last yr - breathing still poor.  Rest good - but not better on prednisone 20/d.  No big improvement on incruse. Sinus better on flonase.     12/28/2017 pt very active, bikes to work with tool boxes with no problem. Had had hospital stays every 6 months til started prednisone continiously  5 yrs ago.  Pt recently flared with recent NSR stay. Pt had pft with fev1 50% at 1.53 liter 12/15/2017.  Very mahmood still.  On breo and singulair.  Use spiriva in past.     From consult last month:Plan:   Pt has eosinophillic asthma and copd.  Pt should respond to singulair.  Higher dose steroid action plan needed.  He would  likely do very well with il5 rx.     outpt soon on prednisone 60/d x 7 with taper, going to breo 200, and singulair would be good with current rx.  Would not strop prednisone.  outpt f/u recommended. I do no see asbestos complications.  History of Present Illness:  Cc is sob x 3 days.   Pt worked [employer] as .  Stopped smoking yrs ago. FH + asthma with no prior h/o asthma.  Has had cough and wheezes and sob going back prior to [place].  Pt noted needed freq steroids so was kept on prednisone 5 mg daily with need to increase dose ever 2-3 months because of increase sob.  Pt uses breo 100 and xopenex works better than ventolin.  Pt has chr nasal drainage but no bad infections.  abx not used.  He denies spiriva helped in past and denies using daliresp.    Pt rides bike to work - bike weighs 300 lbs with tools for trade.  Pt very active and still works - lives with daughter.    Reviewed note    HPI:      8/21/2018 - Pt with known COPD, asthma (sees Dr Eddy) here for second opinion regarding asbestos lung disease.  Started working on [employer] truck in 1968.  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  He started working as a  in 1987 until present time and reports that he has had asbestos exposure.  He denies working directly with asbestos but did work in the vicinity.  He reports that Dr Eddy thinks he has asbestos issues.  Has had PFT but doesn't remember when.  He has chronic SOB, SHOEMAKER (being addressed by Dr Eddy)  He only uses prn inhalers.  Has some chronic cough and wheezing.  Feels that SOB is worse but he rides bike around at work (weights about 200#).            The chief compliant  problem is varies with instablilty at time  PFSH:  Past Medical History:   Diagnosis Date    Bronchitis     COPD (chronic obstructive pulmonary disease)          Past Surgical History:    Procedure Laterality Date    HERNIA REPAIR      WRIST SURGERY       Social History     Tobacco Use    Smoking status: Former Smoker     Packs/day: 2.00     Years: 40.00     Pack years: 80.00     Last attempt to quit: 3/24/2009     Years since quittin.6    Smokeless tobacco: Never Used   Substance Use Topics    Alcohol use: Yes     Comment: 6-12 beers/night; some days none.    Drug use: No     Family History   Problem Relation Age of Onset    Cancer Mother     Asthma Mother     Heart disease Father     COPD Father     Cancer Father     Cancer Brother         colon cancer    No Known Problems Brother     No Known Problems Brother      Review of patient's allergies indicates:  No Known Allergies  I have reviewed past medical, family, and social history. I have reviewed previous nurse notes.    Performance Status:The patient's activity level is functions out of house.        Review of Systems   Constitutional: Negative for activity change, appetite change, chills, diaphoresis, fatigue, fever and unexpected weight change.   HENT: Negative for dental problem, postnasal drip, rhinorrhea, sinus pressure, sinus pain, sneezing, sore throat, trouble swallowing and voice change.    Respiratory: Negative for apnea, and stridor.  Positive for cough, chest tightness, shortness of breath, wheezing   Cardiovascular: Negative for chest pain, palpitations and leg swelling.   Gastrointestinal: Negative for abdominal distention, abdominal pain, constipation and nausea.   Musculoskeletal: Negative for gait problem, myalgias and neck pain.   Skin: Negative for color change and pallor.   Allergic/Immunologic: Negative for environmental allergies and food allergies.   Neurological: Negative for dizziness, speech difficulty, weakness, light-headedness, numbness and headaches.   Hematological: Negative for adenopathy. Does not bruise/bleed easily.   Psychiatric/Behavioral: Negative for dysphoric mood and sleep disturbance.  "The patient is not nervous/anxious.           Exam:Comprehensive exam done. BP (!) 147/78 (BP Location: Right arm, Patient Position: Sitting)   Pulse 88   Ht 5' 7" (1.702 m)   Wt 78.9 kg (173 lb 15.1 oz)   SpO2 95% Comment: on room air  BMI 27.24 kg/m²   Exam included Vitals as listed  Constitutional: He is oriented to person, place, and time. He appears well-developed. No distress.   Nose: Nose normal.   Mouth/Throat: Uvula is midline, oropharynx is clear and moist and mucous membranes are normal. No dental caries. No oropharyngeal exudate, posterior oropharyngeal edema, posterior oropharyngeal erythema or tonsillar abscesses.  Mallapatti (M) score II  Eyes: Pupils are equal, round, and reactive to light.   Neck: No JVD present. No thyromegaly present.   Cardiovascular: Normal rate, regular rhythm and normal heart sounds. Exam reveals no gallop and no friction rub.   No murmur heard.  Pulmonary/Chest: Effort normal and breath sounds normal. No accessory muscle usage or stridor. No apnea and no tachypnea. No respiratory distress, decreased breath sounds, wheezes, rhonchi, rales, or tenderness.   Abdominal: Soft. He exhibits no mass. There is no tenderness. No hepatosplenomegaly, hernias and normoactive bowel sounds  Musculoskeletal: Normal range of motion. exhibits no edema.   Lymphadenopathy:     He has no cervical adenopathy.     He has no axillary adenopathy.   Neurological:  alert and oriented to person, place, and time. not disoriented.   Skin: Skin is warm and dry. Capillary refill takes less 2 sec. No cyanosis or erythema. No pallor. Nails show no clubbing.   Psychiatric: normal mood and affect. behavior is normal. Judgment and thought content normal.       Radiographs (ct chest and cxr) reviewed: results reviewed   CTA of the chest with IV contrast PE protocol   1. No CT evidence for central or apparent peripheral pulmonary thromboembolus.     2. No aortic dissection or aneurysm identified.       3.  " Centrilobular emphysema with bronchial wall thickening.  Phone report to Krishna Martinez M.D.    Electronically signed by: ROS SNELL MD  Date: 12/11/17     XR CHEST 1 VIEW  FINDINGS:  The lungs are clear, with normal appearance of pulmonary vasculature and no pleural effusion or pneumothorax.    The cardiac silhouette is normal in size. The hilar and mediastinal contours are unremarkable.    Bones are intact.      Impression       1. No abnormality identified.      Electronically signed by: Ros Snell II, MD  Date: 11/08/2018  Time: 09:01       Labs reviewed       Lab Results   Component Value Date    WBC 12.30 11/08/2018    RBC 4.37 (L) 11/08/2018    HGB 13.5 (L) 11/08/2018    HCT 40.1 11/08/2018    MCV 92 11/08/2018    MCH 30.8 11/08/2018    MCHC 33.5 11/08/2018    RDW 13.4 11/08/2018     11/08/2018    MPV 8.7 (L) 11/08/2018    GRAN 11.2 (H) 11/08/2018    GRAN 91.4 (H) 11/08/2018    LYMPH 0.6 (L) 11/08/2018    LYMPH 5.1 (L) 11/08/2018    MONO 0.4 11/08/2018    MONO 3.3 (L) 11/08/2018    EOS 0.0 11/08/2018    BASO 0.00 11/08/2018    EOSINOPHIL 0.0 11/08/2018    BASOPHIL 0.2 11/08/2018       PFT results reviewed  Pulmonary Functions Testing Results:  PFT results reviewed fev1 50 % at 1.53 with no bd response 12/15/2017         Plan:  Clinical impression is resonably certain and repeated evaluation prn +/- follow up will be needed as below.    Brad was seen today for follow-up and shortness of breath.    Diagnoses and all orders for this visit:    COPD with acute exacerbation  -     amoxicillin (AMOXIL) 875 MG tablet; Take 1 tablet (875 mg total) by mouth 2 (two) times daily. for 5 days  -     budesonide (PULMICORT) 0.5 mg/2 mL nebulizer solution; Take 2 mLs (0.5 mg total) by nebulization once daily. Controller  -     arformoterol (BROVANA) 15 mcg/2 mL Nebu; Take 2 mLs (15 mcg total) by nebulization 2 (two) times daily. Controller  -     betamethasone acetate-betamethasone sodium phosphate injection 6  mg    Steroid-dependent chronic obstructive pulmonary disease  -     budesonide (PULMICORT) 0.5 mg/2 mL nebulizer solution; Take 2 mLs (0.5 mg total) by nebulization once daily. Controller  -     arformoterol (BROVANA) 15 mcg/2 mL Nebu; Take 2 mLs (15 mcg total) by nebulization 2 (two) times daily. Controller        Follow-up in about 3 months (around 2/9/2019), or if symptoms worsen or fail to improve.    Discussed with patient above for education the following:      Patient Instructions   Celestone injection in clinic     Prednisone taper 20 mg pills 3 pills for 3 days, 2 pills for three days, 1 pill for three days,   Return to 10 mg a day.    Amoxicillin 1 pill in am 1 pill in afternoon for 5 days, if you cough up yellow green mucous    Stop Advair after container empty  Nebulizer Medications    Start Brovana and Pulmicort once daily,    Use Duo-neb nebulizer every 4 hours as needed for Shortness of breath and cough.

## 2018-11-09 NOTE — PATIENT INSTRUCTIONS
Celestone injection in clinic     Prednisone taper 20 mg pills 3 pills for 3 days, 2 pills for three days, 1 pill for three days,   Return to 10 mg a day.    Amoxicillin 1 pill in am 1 pill in afternoon for 5 days, if you cough up yellow green mucous    Stop Advair after container empty  Nebulizer Medications    Start Brovana and Pulmicort once daily,    Use Duo-neb nebulizer every 4 hours as needed for Shortness of breath and cough.

## 2018-11-12 ENCOUNTER — TELEPHONE (OUTPATIENT)
Dept: PULMONOLOGY | Facility: CLINIC | Age: 68
End: 2018-11-12

## 2018-11-12 DIAGNOSIS — J44.9 CHRONIC OBSTRUCTIVE PULMONARY DISEASE, UNSPECIFIED COPD TYPE: Primary | ICD-10-CM

## 2018-11-12 RX ORDER — FLUTICASONE PROPIONATE AND SALMETEROL 500; 50 UG/1; UG/1
1 POWDER RESPIRATORY (INHALATION) 2 TIMES DAILY
Qty: 60 EACH | Refills: 5 | Status: SHIPPED | OUTPATIENT
Start: 2018-11-12 | End: 2018-12-20 | Stop reason: ALTCHOICE

## 2018-11-12 RX ORDER — IPRATROPIUM BROMIDE AND ALBUTEROL SULFATE 2.5; .5 MG/3ML; MG/3ML
3 SOLUTION RESPIRATORY (INHALATION) EVERY 6 HOURS PRN
Qty: 120 VIAL | Refills: 5 | OUTPATIENT
Start: 2018-11-12 | End: 2019-03-20 | Stop reason: SDUPTHER

## 2018-11-12 NOTE — TELEPHONE ENCOUNTER
Informed the patient that Advair was sent to [facility] and the nebulizer med Duo-Neb will be sent to [facility] and I gave him the [facility] number.      ----- Message from Leatha Nowak sent at 11/12/2018  8:16 AM CST -----  Contact: Brad  Type:  RX Refill Request    Who Called:  patient  Refill or New Rx: refill  RX Name and Strength:    1. arformoterol (BROVANA) 15 mcg/2 mL Nebu  2. budesonide (PULMICORT) 0.5 mg/2 mL nebulizer solution   How is the patient currently taking it? (ex. 1XDay):    1. Take 2 mLs (15 mcg total) by nebulization 2 (two) times daily  2. Take 2 mLs (0.5 mg total) by nebulization once daily  Is this a 30 day or 90 day RX:  30  Preferred Pharmacy with phone number:    [facility] [address] [place], [place] [facility] [address] [facility]  [address] [facility] [address]  Phone: [phone] Fax: [phone]  Local or Mail Order:  Local   Ordering Provider:  Nidia Guevara  Best Call Back Number:  [phone]  Additional Information:  Calling as only got Rx Amoxicillin as did not get the two above. Thanks!

## 2018-12-20 ENCOUNTER — OFFICE VISIT (OUTPATIENT)
Dept: PULMONOLOGY | Facility: CLINIC | Age: 68
End: 2018-12-20
Payer: MEDICARE

## 2018-12-20 ENCOUNTER — HOSPITAL ENCOUNTER (OUTPATIENT)
Dept: RESPIRATORY THERAPY | Facility: HOSPITAL | Age: 68
Discharge: HOME | End: 2018-12-20
Attending: NURSE PRACTITIONER
Payer: MEDICARE

## 2018-12-20 VITALS
HEIGHT: 67 IN | DIASTOLIC BLOOD PRESSURE: 72 MMHG | HEART RATE: 98 BPM | SYSTOLIC BLOOD PRESSURE: 142 MMHG | BODY MASS INDEX: 28.52 KG/M2 | OXYGEN SATURATION: 94 % | WEIGHT: 181.69 LBS

## 2018-12-20 DIAGNOSIS — J96.01 ACUTE HYPOXEMIC RESPIRATORY FAILURE: ICD-10-CM

## 2018-12-20 DIAGNOSIS — J45.50 SEVERE PERSISTENT ASTHMA WITHOUT COMPLICATION: Primary | ICD-10-CM

## 2018-12-20 DIAGNOSIS — J44.9 STEROID-DEPENDENT CHRONIC OBSTRUCTIVE PULMONARY DISEASE: ICD-10-CM

## 2018-12-20 DIAGNOSIS — Z92.241 STEROID-DEPENDENT CHRONIC OBSTRUCTIVE PULMONARY DISEASE: ICD-10-CM

## 2018-12-20 PROBLEM — J44.1 COPD EXACERBATION: Status: RESOLVED | Noted: 2017-12-11 | Resolved: 2018-12-20

## 2018-12-20 PROBLEM — J45.901 ACUTE EXACERBATION OF COPD WITH ASTHMA: Status: RESOLVED | Noted: 2017-12-13 | Resolved: 2018-12-20

## 2018-12-20 PROBLEM — J44.1 ACUTE EXACERBATION OF COPD WITH ASTHMA: Status: RESOLVED | Noted: 2017-12-13 | Resolved: 2018-12-20

## 2018-12-20 LAB — BR6MWT: NORMAL

## 2018-12-20 PROCEDURE — 99213 OFFICE O/P EST LOW 20 MIN: CPT | Mod: S$PBB,,, | Performed by: NURSE PRACTITIONER

## 2018-12-20 PROCEDURE — 99213 OFFICE O/P EST LOW 20 MIN: CPT | Mod: PBBFAC,PO,25 | Performed by: NURSE PRACTITIONER

## 2018-12-20 PROCEDURE — 94618 PULMONARY STRESS TESTING: CPT

## 2018-12-20 PROCEDURE — 99999 PR PBB SHADOW E&M-EST. PATIENT-LVL III: CPT | Mod: PBBFAC,,, | Performed by: NURSE PRACTITIONER

## 2018-12-20 NOTE — PATIENT INSTRUCTIONS
Six minute walk at hospital to get portable oxygen    Start taking Trelegy one puff daily every day    Dupixent is special shot you give yourself every 2 weeks, expect phone calls from [facility]     Blood work at hospital    Continue Prednisone 20 mg daily

## 2018-12-21 ENCOUNTER — TELEPHONE (OUTPATIENT)
Dept: PHARMACY | Facility: CLINIC | Age: 68
End: 2018-12-21

## 2018-12-21 NOTE — TELEPHONE ENCOUNTER
DOCUMENTATION ONLY:  Prior Authorization for Dupixent approved from 01/04/19 to 12/31/19    Case Id: [id number]    Co-pay: $370    Patient Assistance IS NOT required  [facility]          DOCUMENTATION ONLY Submitted prior authorization request for Dupixent to insurance on 01/04 3:33pm. [facility]        DOCUMENTATION ONLY:  Prior Authorization for Dupixent approved from 09/25/18 to 12/24/19    Case Id: [id number]    Co-pay: $1039.96    Patient Assistance IS required  [facility]          DOCUMENTATION ONLY  Submitted prior authorization request for Dupixent to insurance on 12/21 5:45pm. [facility]

## 2019-01-16 ENCOUNTER — TELEPHONE (OUTPATIENT)
Dept: PHARMACY | Facility: CLINIC | Age: 69
End: 2019-01-16

## 2019-01-16 NOTE — TELEPHONE ENCOUNTER
Initial Dupixent consult completed on 19. Dupixent shipment to provider's office is pending. $0.00 copay. Patient intends to start Dupixent once scheduled for appointment. Address confirmed. Confirmed 2 patient identifiers - name and . Therapy Appropriate.    --Injection experience: No self-injection    Counseled patient on administration directions:    Inject 300mg into the skin once every 14 days (after loading dose of 600mg)   Take out of the refrigerator 45 minutes prior to injection.   Wash hands before and after injection.   Monthly RX will come with gauze, Band-Aids, and alcohol swabs.   Patient may inject in either the tops of thighs or lower abdomen- but at least 2 inches away from the belly button, or the outer or back part of his/ her upper arm (if a caregiver administers).   Patient is to wipe down the injection site with the alcohol pad, wait to dry.  Insert the needle at a 45 to 90 degree angle straight into the skin.  Hold the syringe steady and slowly push down the plunger, then remove the needle.    Patient will use sharps container; once full, per TIP blood, he/she may lock the sharps container and place in the trash. He/ She can then contact the Pharmacy and we will replace the sharps at no additional charge.    Patient was counseled on possible side effects:  · Injection site reaction: redness, soreness, itching, bruising, lipoatrophy, swelling, lumps, pain and rash  · Dermatologic: Herpes simplex infection (2%)  · Gastrointestinal: Oral herpes (4%)  · Immunologic: Antibody development (7%; neutralizin%)  · Local: Injection site reaction (10%)  · Ophthalmic: Conjunctivitis (10%), eye pruritus (1%)   · ER precautions advised for signs and symptoms of allergic reaction      Patient did not have any further questions. Patinet was advised to keep a calendar to stay compliant.     Consultation included: indication; goals of treatment; administration; storage and handling; side effects;  how to handle side effects; the importance of compliance; how to handle missed doses; the importance of laboratory monitoring; the importance of keeping all follow up appointments. Patient understands to report any medication changes to [facility] and provider. All questions answered and addressed to patients satisfaction. I will f/u with her in 1 week from start, [facility] to contact patient in 3 weeks for refills.    Vincent Shannon, PharmD  Clinical Pharmacist  [facility]  P: [phone]    InModesta sent to provider on 1/16/19 at 10:42 am

## 2019-01-17 ENCOUNTER — TELEPHONE (OUTPATIENT)
Dept: PULMONOLOGY | Facility: CLINIC | Age: 69
End: 2019-01-17

## 2019-01-17 NOTE — TELEPHONE ENCOUNTER
No answer, left voice mail for pt to return my call.  No action taken.     ----- Message from Felicity May sent at 1/17/2019 11:13 AM CST -----  Contact: self  Patient need to speak to nurse regarding patient need to schedule appointment for a Dupixent injection    Patient states he has clearance on injection from pharmacy ,[facility] insurance per patient       Please call to advice [phone]

## 2019-01-18 ENCOUNTER — TELEPHONE (OUTPATIENT)
Dept: PULMONOLOGY | Facility: CLINIC | Age: 69
End: 2019-01-18

## 2019-01-18 NOTE — TELEPHONE ENCOUNTER
Left message no answer.   ----- Message from Chantal Ramirez sent at 1/18/2019  7:43 AM CST -----  Contact: self  Type:  Patient Returning Call    Who Called:  self  Who Left Message for Patient:  Viki  Does the patient know what this is regarding?:  yes  Best Call Back Number:  [phone]  Additional Information:  Patient is returning call from yesterday regarding scheduling his injections. Patient is available until 11:30 due to and doctor's appointment. Please call patient. Thanks!

## 2019-01-31 ENCOUNTER — TELEPHONE (OUTPATIENT)
Dept: PHARMACY | Facility: CLINIC | Age: 69
End: 2019-01-31

## 2019-01-31 NOTE — TELEPHONE ENCOUNTER
Patient called [facility] for follow up and refill for Dupixent. He received his first dose with the nurse giving him the injection, but he declined further consultation. He is aware that the medication is to be refrigerated until he is due to inject and he can take it out about 30-60 min prior. Patient did not experience any side effects with his last injection. He feels comfortable completing his next injection on his own; however, we did discuss that he can reach out to [facility] at any time and we would be happy to walk him through the injection process if needed. Medication list reviewed; no new meds, allergies or health conditions. Patient states he has not yet seen much of a benefit with the Dupixent yet, but did note that he uses his rescue inhaler less often.

## 2019-02-05 DIAGNOSIS — Z92.241 STEROID-DEPENDENT CHRONIC OBSTRUCTIVE PULMONARY DISEASE: ICD-10-CM

## 2019-02-05 DIAGNOSIS — J44.9 STEROID-DEPENDENT CHRONIC OBSTRUCTIVE PULMONARY DISEASE: ICD-10-CM

## 2019-02-05 DIAGNOSIS — J45.901 ACUTE EXACERBATION OF COPD WITH ASTHMA: ICD-10-CM

## 2019-02-05 DIAGNOSIS — J44.1 ACUTE EXACERBATION OF COPD WITH ASTHMA: ICD-10-CM

## 2019-02-05 RX ORDER — PREDNISONE 20 MG/1
TABLET ORAL
Qty: 42 TABLET | Refills: 1 | Status: SHIPPED | OUTPATIENT
Start: 2019-02-05 | End: 2019-03-20 | Stop reason: SDUPTHER

## 2019-02-21 ENCOUNTER — TELEPHONE (OUTPATIENT)
Dept: PHARMACY | Facility: CLINIC | Age: 69
End: 2019-02-21

## 2019-03-20 ENCOUNTER — OFFICE VISIT (OUTPATIENT)
Dept: PULMONOLOGY | Facility: CLINIC | Age: 69
End: 2019-03-20
Payer: MEDICARE

## 2019-03-20 ENCOUNTER — HOSPITAL ENCOUNTER (OUTPATIENT)
Dept: RADIOLOGY | Facility: HOSPITAL | Age: 69
Discharge: HOME | End: 2019-03-20
Attending: NURSE PRACTITIONER
Payer: MEDICARE

## 2019-03-20 VITALS
BODY MASS INDEX: 28.65 KG/M2 | WEIGHT: 182.56 LBS | SYSTOLIC BLOOD PRESSURE: 149 MMHG | HEART RATE: 91 BPM | HEIGHT: 67 IN | OXYGEN SATURATION: 95 % | DIASTOLIC BLOOD PRESSURE: 67 MMHG

## 2019-03-20 DIAGNOSIS — J44.1 ACUTE EXACERBATION OF COPD WITH ASTHMA: ICD-10-CM

## 2019-03-20 DIAGNOSIS — J45.901 ACUTE EXACERBATION OF COPD WITH ASTHMA: ICD-10-CM

## 2019-03-20 DIAGNOSIS — Z92.241 STEROID-DEPENDENT CHRONIC OBSTRUCTIVE PULMONARY DISEASE: ICD-10-CM

## 2019-03-20 DIAGNOSIS — Z79.52 CURRENT CHRONIC USE OF SYSTEMIC STEROIDS: ICD-10-CM

## 2019-03-20 DIAGNOSIS — J82.83 EOSINOPHILIC ASTHMA: ICD-10-CM

## 2019-03-20 DIAGNOSIS — J44.9 CHRONIC OBSTRUCTIVE PULMONARY DISEASE, UNSPECIFIED COPD TYPE: ICD-10-CM

## 2019-03-20 DIAGNOSIS — J44.9 STEROID-DEPENDENT CHRONIC OBSTRUCTIVE PULMONARY DISEASE: ICD-10-CM

## 2019-03-20 PROCEDURE — 99213 PR OFFICE/OUTPT VISIT, EST, LEVL III, 20-29 MIN: ICD-10-PCS | Mod: S$PBB,,, | Performed by: NURSE PRACTITIONER

## 2019-03-20 PROCEDURE — 71046 XR CHEST PA AND LATERAL: ICD-10-PCS | Mod: 26,,, | Performed by: RADIOLOGY

## 2019-03-20 PROCEDURE — 99213 OFFICE O/P EST LOW 20 MIN: CPT | Mod: PBBFAC,PO,25 | Performed by: NURSE PRACTITIONER

## 2019-03-20 PROCEDURE — 71046 X-RAY EXAM CHEST 2 VIEWS: CPT | Mod: 26,,, | Performed by: RADIOLOGY

## 2019-03-20 PROCEDURE — 99999 PR PBB SHADOW E&M-EST. PATIENT-LVL III: CPT | Mod: PBBFAC,,, | Performed by: NURSE PRACTITIONER

## 2019-03-20 PROCEDURE — 71046 X-RAY EXAM CHEST 2 VIEWS: CPT | Mod: TC,FY

## 2019-03-20 PROCEDURE — 99213 OFFICE O/P EST LOW 20 MIN: CPT | Mod: S$PBB,,, | Performed by: NURSE PRACTITIONER

## 2019-03-20 PROCEDURE — 99999 PR PBB SHADOW E&M-EST. PATIENT-LVL III: ICD-10-PCS | Mod: PBBFAC,,, | Performed by: NURSE PRACTITIONER

## 2019-03-20 RX ORDER — ALBUTEROL SULFATE 90 UG/1
AEROSOL, METERED RESPIRATORY (INHALATION)
Qty: 1 INHALER | Refills: 11 | Status: SHIPPED | OUTPATIENT
Start: 2019-03-20 | End: 2020-01-17

## 2019-03-20 RX ORDER — PREDNISONE 5 MG/1
10 TABLET ORAL DAILY
Qty: 60 TABLET | Refills: 11 | Status: SHIPPED | OUTPATIENT
Start: 2019-03-20 | End: 2020-01-22 | Stop reason: SDUPTHER

## 2019-03-20 RX ORDER — PREDNISONE 20 MG/1
TABLET ORAL
Qty: 42 TABLET | Refills: 1 | Status: SHIPPED | OUTPATIENT
Start: 2019-03-20 | End: 2019-06-20 | Stop reason: SDUPTHER

## 2019-03-20 RX ORDER — IPRATROPIUM BROMIDE AND ALBUTEROL SULFATE 2.5; .5 MG/3ML; MG/3ML
3 SOLUTION RESPIRATORY (INHALATION) EVERY 6 HOURS PRN
Qty: 120 VIAL | Refills: 5 | Status: SHIPPED | OUTPATIENT
Start: 2019-03-20 | End: 2019-07-22 | Stop reason: SDUPTHER

## 2019-03-20 NOTE — PATIENT INSTRUCTIONS
Do Prednisone taper 20 mg pills, 3 pills for 3 days, 2 pills for 3 days, 1 pill for 3 days    Return to prendnisone 10 mg daily or can go up to 15 mg daily    Continue Asthma/COPD   Continue Dupixent injections every 2 weeks  Continue Trelegy one puff daily    Continue Nebulized albuterol every 6 hours 4 times a day until symptoms resolved    Chest X-ray at hospital today to evaluate for pneumonia  Blood work to evaluate for infection    Overnight pulse oximetry to qualify for oxygen while sleeping

## 2019-03-20 NOTE — PROGRESS NOTES
3/20/2019    Brad Nowak  Office Note    Chief Complaint   Patient presents with    Follow-up     3 months, feels Dupixent has worsened shortness of breath    COPD    Asthma    Shortness of Breath       HPI:   03/20/2019-Breathing worsened in past month, on dupixent since January, Currently on Trelegy 1 puff daily and prednisone 10 mg daily every day.   No fever, no body aches, no pain, no wheeze, not able to do activities with out shortness of breath. Occasional non productive cough.     12/20/18- Breathing worsened, SOB with any exertion, not taking advair due to cost.  On long term prednisone therapy for uncontrolled severe persistent asthma. Eosinophil count artificially low due steroid use.    11/9/18- ER at [facility] for chest pain, chest tightness, elevated pulse rate of 130, Discharged  Dx: COPD exacerbation, treated with albuterol nebulizer. States Advair cost to high, $183 month, States recently retired.   Using Duo neb 2x daily, 2-3 albuterol inhaler daily.    9/20/18- pt presents in clinic alone, states taking prednisone 10 mg daily and 20 mg daily twice a week when SOB, has taken 40 mg a day once last week. Uses rescue inhaler most days after climbing stairs at work. Has trouble sleeping. Discussed fasenra and pharmacy unable to contact patient. States he does not carry phone at work during day, does not answer numbers he does not know, and has no voicemail set up at moment. States using Advair Diskus daily. Cough every few days, non productive. Worse at night.      July 5, 2018- on prednisone 5/d with 20/d up to 3 weekly.  Was in hospital December.  Breathing worse, saw asbestos  and advised 2nd opinion, to see Dr Rodriguez soon.  No fasenra as pharmacy unable to contact pt. Pt ues prednisone 5/d and not using trelegy- has one puff left on 14 day sample given in April and relates trelegy not seems to help.  April 5 - increase prednisone from 5/d to 20/d since last yr - breathing still  poor.  Rest good - but not better on prednisone 20/d.  No big improvement on incruse. Sinus better on flonase.     12/28/2017 pt very active, bikes to work with tool boxes with no problem. Had had hospital stays every 6 months til started prednisone continiously  5 yrs ago.  Pt recently flared with recent NSR stay. Pt had pft with fev1 50% at 1.53 liter 12/15/2017.  Very mahmood still.  On breo and singulair.  Use spiriva in past.     From consult last month:Plan:   Pt has eosinophillic asthma and copd.  Pt should respond to singulair.  Higher dose steroid action plan needed.  He would likely do very well with il5 rx.     outpt soon on prednisone 60/d x 7 with taper, going to breo 200, and singulair would be good with current rx.  Would not strop prednisone.  outpt f/u recommended. I do no see asbestos complications.  History of Present Illness:  Cc is sob x 3 days.   Pt worked [employer] as .  Stopped smoking yrs ago. FH + asthma with no prior h/o asthma.  Has had cough and wheezes and sob going back prior to [place].  Pt noted needed freq steroids so was kept on prednisone 5 mg daily with need to increase dose ever 2-3 months because of increase sob.  Pt uses breo 100 and xopenex works better than ventolin.  Pt has chr nasal drainage but no bad infections.  abx not used.  He denies spiriva helped in past and denies using daliresp.    Pt rides bike to work - bike weighs 300 lbs with tools for trade.  Pt very active and still works - lives with daughter.    Reviewed note    HPI:      8/21/2018 - Pt with known COPD, asthma (sees Dr Eddy) here for second opinion regarding asbestos lung disease.  Started working on [employer] in 1968.  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  He started working as a  in 1987 until present time and reports that he has had asbestos exposure.   He denies working directly with asbestos but did work in the vicinity.  He reports that Dr Eddy thinks he has asbestos issues.  Has had PFT but doesn't remember when.  He has chronic SOB, SHOEMAKER (being addressed by Dr Eddy)  He only uses prn inhalers.  Has some chronic cough and wheezing.  Feels that SOB is worse but he rides bike around at work (weights about 200#).            The chief compliant  problem is worsening  PFSH:  Past Medical History:   Diagnosis Date    Bronchitis     COPD (chronic obstructive pulmonary disease)          Past Surgical History:   Procedure Laterality Date    HERNIA REPAIR      WRIST SURGERY       Social History     Tobacco Use    Smoking status: Former Smoker     Packs/day: 2.00     Years: 40.00     Pack years: 80.00     Last attempt to quit: 3/24/2009     Years since quittin.9    Smokeless tobacco: Never Used   Substance Use Topics    Alcohol use: Yes     Comment: 6-12 beers/night; some days none.    Drug use: No     Family History   Problem Relation Age of Onset    Cancer Mother     Asthma Mother     Heart disease Father     COPD Father     Cancer Father     Cancer Brother         colon cancer    No Known Problems Brother     No Known Problems Brother      Review of patient's allergies indicates:  No Known Allergies  I have reviewed past medical, family, and social history. I have reviewed previous nurse notes.    Performance Status:The patient's activity level is household activities.        Review of Systems   Constitutional: Negative for activity change, appetite change, chills, diaphoresis, fatigue, fever and unexpected weight change.   HENT: Negative for dental problem, postnasal drip, rhinorrhea, sinus pressure, sinus pain, sneezing, sore throat, trouble swallowing and voice change.    Respiratory: Negative for apnea, and stridor.  Positive for cough, chest tightness, shortness of breath, wheezing   Cardiovascular: Negative for chest pain, palpitations and leg  "swelling.   Gastrointestinal: Negative for abdominal distention, abdominal pain, constipation and nausea.   Musculoskeletal: Negative for gait problem, myalgias and neck pain.   Skin: Negative for color change and pallor.   Allergic/Immunologic: Negative for environmental allergies and food allergies.   Neurological: Negative for dizziness, speech difficulty, weakness, light-headedness, numbness and headaches.   Hematological: Negative for adenopathy. Does not bruise/bleed easily.   Psychiatric/Behavioral: Negative for dysphoric mood and sleep disturbance. The patient is not nervous/anxious.           Exam:Comprehensive exam done. BP (!) 149/67 (BP Location: Left arm, Patient Position: Sitting)   Pulse 91   Ht 5' 7" (1.702 m)   Wt 82.8 kg (182 lb 8.7 oz)   SpO2 95%   BMI 28.59 kg/m²   Exam included Vitals as listed  Constitutional: He is oriented to person, place, and time. He appears well-developed. No distress.   Nose: Nose normal.   Mouth/Throat: Uvula is midline, oropharynx is clear and moist and mucous membranes are normal. No dental caries. No oropharyngeal exudate, posterior oropharyngeal edema, posterior oropharyngeal erythema or tonsillar abscesses.  Mallapatti (M) score II  Eyes: Pupils are equal, round, and reactive to light.   Neck: No JVD present. No thyromegaly present.   Cardiovascular: Normal rate, regular rhythm and normal heart sounds. Exam reveals no gallop and no friction rub.   No murmur heard.  Pulmonary/Chest: Effort normal and breath sounds normal. No accessory muscle usage or stridor. No apnea and no tachypnea. No respiratory distress, decreased breath sounds, wheezes, rhonchi, rales, or tenderness.   Abdominal: Soft. He exhibits no mass. There is no tenderness. No hepatosplenomegaly, hernias and normoactive bowel sounds  Musculoskeletal: Normal range of motion. exhibits no edema.   Lymphadenopathy:     He has no cervical adenopathy.     He has no axillary adenopathy.   Neurological:  " alert and oriented to person, place, and time. not disoriented.   Skin: Skin is warm and dry. Capillary refill takes less 2 sec. No cyanosis or erythema. No pallor. Nails show clubbing.   Psychiatric: normal mood and affect. behavior is normal. Judgment and thought content normal.       Radiographs (ct chest and cxr) reviewed: results reviewed   CTA of the chest with IV contrast PE protocol   1. No CT evidence for central or apparent peripheral pulmonary thromboembolus.     2. No aortic dissection or aneurysm identified.       3.  Centrilobular emphysema with bronchial wall thickening.  Phone report to Krishna Martinez M.D.    Electronically signed by: ROS SNELL MD  Date: 12/11/17     XR CHEST 1 VIEW  FINDINGS:  The lungs are clear, with normal appearance of pulmonary vasculature and no pleural effusion or pneumothorax.    The cardiac silhouette is normal in size. The hilar and mediastinal contours are unremarkable.    Bones are intact.      Impression       1. No abnormality identified.      Electronically signed by: Ros Snell II, MD  Date: 11/08/2018  Time: 09:01       Labs reviewed       Lab Results   Component Value Date    WBC 8.40 12/20/2018    RBC 3.89 (L) 12/20/2018    HGB 12.1 (L) 12/20/2018    HCT 35.7 (L) 12/20/2018    MCV 92 12/20/2018    MCH 31.0 12/20/2018    MCHC 33.8 12/20/2018    RDW 14.1 12/20/2018     12/20/2018    MPV 8.1 (L) 12/20/2018    GRAN 6.4 12/20/2018    GRAN 75.9 (H) 12/20/2018    LYMPH 1.3 12/20/2018    LYMPH 15.9 (L) 12/20/2018    MONO 0.6 12/20/2018    MONO 6.8 12/20/2018    EOS 0.1 12/20/2018    BASO 0.00 12/20/2018    EOSINOPHIL 1.1 12/20/2018    BASOPHIL 0.3 12/20/2018   Results for CRISTINA MADELEINE EVANGELISTA (MRN [id number]) as of 12/20/2018 14:08   Ref. Range 11/8/2018 02:57   Sodium Latest Ref Range: 136 - 145 mmol/L 139   Potassium Latest Ref Range: 3.5 - 5.1 mmol/L 3.7   Chloride Latest Ref Range: 101 - 111 mmol/L 100 (L)   CO2 Latest Ref Range: 23 - 29 mmol/L 30 (H)   Anion  Gap Latest Ref Range: 8 - 16 mmol/L 9   BUN, Bld Latest Ref Range: 8 - 23 mg/dL 15   Creatinine Latest Ref Range: 0.5 - 1.4 mg/dL 0.8   eGFR if non African American Latest Ref Range: >60 mL/min/1.73 m^2 >60.0   eGFR if African American Latest Ref Range: >60 mL/min/1.73 m^2 >60.0   Glucose Latest Ref Range: 74 - 118 mg/dL 141 (H)   Calcium Latest Ref Range: 8.6 - 10.0 mg/dL 9.0   Alkaline Phosphatase Latest Ref Range: 38 - 126 U/L 44   Total Protein Latest Ref Range: 6.0 - 8.4 g/dL 7.4   Albumin Latest Ref Range: 3.5 - 5.2 g/dL 3.7   Total Bilirubin Latest Ref Range: 0.3 - 1.2 mg/dL 0.6   AST Latest Ref Range: 15 - 41 U/L 25   ALT Latest Ref Range: 17 - 63 U/L 30   Troponin I Latest Ref Range: 0.01 - 0.05 ng/mL <0.01   BNP Latest Ref Range: 0 - 99 pg/mL 26   Results for MADELEINE EVANS (MRN [id number]) as of 12/20/2018 14:08   Ref. Range 12/11/2017 05:14   Eos # Latest Ref Range: 0.0 - 0.5 K/uL 0.5       PFT results reviewed  Pulmonary Functions Testing Results:  PFT results reviewed fev1 50 % at 1.53 with no bd response 12/15/2017      6 min walk study was done December 20, 2018.  Baseline room air saturation is 94%.  With ambulation O2 sat did fall 90% but no lower.  Patient could walk 425 m.  Patient walked 82% of the reference  distance       Plan:  Clinical impression is resonably certain and repeated evaluation prn +/- follow up will be needed as below.    Madeleine was seen today for follow-up, copd, asthma and shortness of breath.    Diagnoses and all orders for this visit:    Chronic obstructive pulmonary disease, unspecified COPD type  -     albuterol-ipratropium (DUO-NEB) 2.5 mg-0.5 mg/3 mL nebulizer solution; Take 3 mLs by nebulization every 6 (six) hours as needed for Wheezing or Shortness of Breath. Rescue  -     Pulse oximetry overnight; Future    Steroid-dependent chronic obstructive pulmonary disease  -     albuterol (PROVENTIL/VENTOLIN HFA) 90 mcg/actuation inhaler; 2 puffs every 4 hours as needed for  cough, wheeze, or shortness of breath  -     fluticasone-umeclidin-vilanter (TRELEGY ELLIPTA) 100-62.5-25 mcg DsDv; Inhale 1 puff into the lungs once daily.  -     predniSONE (DELTASONE) 20 MG tablet; Take 3 daily for 7 days then 2 daily for 7 days then 1 daily for 7 days.  -     predniSONE (DELTASONE) 5 MG tablet; Take 2 tablets (10 mg total) by mouth once daily.    Eosinophilic asthma  -     albuterol (PROVENTIL/VENTOLIN HFA) 90 mcg/actuation inhaler; 2 puffs every 4 hours as needed for cough, wheeze, or shortness of breath    Current chronic use of systemic steroids  -     albuterol (PROVENTIL/VENTOLIN HFA) 90 mcg/actuation inhaler; 2 puffs every 4 hours as needed for cough, wheeze, or shortness of breath    Acute exacerbation of COPD with asthma  -     CBC auto differential; Future  -     Procalcitonin; Future  -     predniSONE (DELTASONE) 20 MG tablet; Take 3 daily for 7 days then 2 daily for 7 days then 1 daily for 7 days.  -     predniSONE (DELTASONE) 5 MG tablet; Take 2 tablets (10 mg total) by mouth once daily.        Follow-up in about 3 months (around 6/20/2019), or if symptoms worsen or fail to improve.    Discussed with patient above for education the following:      Patient Instructions   Do Prednisone taper 20 mg pills, 3 pills for 3 days, 2 pills for 3 days, 1 pill for 3 days    Return to prendnisone 10 mg daily or can go up to 15 mg daily    Continue Asthma/COPD   Continue Dupixent injections every 2 weeks  Continue Trelegy one puff daily    Continue Nebulized albuterol every 6 hours 4 times a day until symptoms resolved    Chest X-ray at hospital today to evaluate for pneumonia  Blood work to evaluate for infection    Overnight pulse oximetry to qualify for oxygen while sleeping

## 2019-03-21 ENCOUNTER — TELEPHONE (OUTPATIENT)
Dept: PULMONOLOGY | Facility: CLINIC | Age: 69
End: 2019-03-21

## 2019-03-21 NOTE — TELEPHONE ENCOUNTER
lvm  ----- Message from Nidia Guevara NP sent at 3/21/2019  2:15 PM CDT -----  Blood work is finalized, white blood cells show infection but not necessarily pneumonia.  continue current treatment plan.

## 2019-03-21 NOTE — TELEPHONE ENCOUNTER
LVM  ----- Message from Nidia Guevara NP sent at 3/20/2019  4:42 PM CDT -----  Chest x-ray is clear,

## 2019-03-21 NOTE — TELEPHONE ENCOUNTER
Cindy  ----- Message from Nidia Guevara NP sent at 3/20/2019  4:43 PM CDT -----  Blood work shows high white cells, possible current infection.

## 2019-04-05 ENCOUNTER — TELEPHONE (OUTPATIENT)
Dept: PULMONOLOGY | Facility: CLINIC | Age: 69
End: 2019-04-05

## 2019-04-05 ENCOUNTER — TELEPHONE (OUTPATIENT)
Dept: PHARMACY | Facility: CLINIC | Age: 69
End: 2019-04-05

## 2019-04-05 NOTE — TELEPHONE ENCOUNTER
Patient is waiting for the pharmacy to call him back.      ----- Message from Leatha Oneal sent at 4/5/2019  3:01 PM CDT -----  Contact: [phone]  Pt is calling to request refill for dupilumab (DUPIXENT) 300 mg/2 mL Syrg.  This is pt second request no medication pt is requesting a 90 day supply    Pt is requesting a call back      Thank you!

## 2019-04-05 NOTE — TELEPHONE ENCOUNTER
Pharmacy had called him.    ----- Message from Tanisha Vicente sent at 4/5/2019  2:52 PM CDT -----  Contact: Patient  Type:  Patient Returning Call    Who Called:  Patient  Who Left Message for Patient:  Nurse  Does the patient know what this is regarding?:  na  Best Call Back Number:   Additional Information:  Call to pod. No answer.

## 2019-04-05 NOTE — TELEPHONE ENCOUNTER
Refill and followup call for Dupixent. $0.00 copay at 004. No answer, left message. Sent [employer] Message.    Ray Brock, PharmD  Clinical Pharmacist  [facility]  P: [phone]

## 2019-04-09 NOTE — TELEPHONE ENCOUNTER
Refill call for Dupixent. Patient confirmed need for refill. He will pickup at [facility] on Tuesday 4/9/19. $0.00 copay at 004.     Vincent Shannon, PharmD  Clinical Pharmacist  [facility]  P: [phone]

## 2019-04-24 ENCOUNTER — TELEPHONE (OUTPATIENT)
Dept: PULMONOLOGY | Facility: CLINIC | Age: 69
End: 2019-04-24

## 2019-04-24 NOTE — TELEPHONE ENCOUNTER
Pt notified pt verbalized understanding.     ----- Message from Nidia Guevara NP sent at 4/22/2019  8:28 AM CDT -----  Pt did not qualify for oxygen at night. His oxygen level did not drop below 89% while sleeping.

## 2019-04-29 ENCOUNTER — TELEPHONE (OUTPATIENT)
Dept: PHARMACY | Facility: CLINIC | Age: 69
End: 2019-04-29

## 2019-04-29 NOTE — TELEPHONE ENCOUNTER
Patient returned call & confirmed need of Dupixent refill. Verified 2 patient identifiers. Dosage confirmed. $0.00 copay in 004. Payment info on file confirmed. No new meds, allergies, or health conditions. No missed doses. Last dose administered on 4/24. Next dose due on 5/8. Medication will ship 5/1 for  5/2. Delivery address on file verified. Patient voiced understanding & has no questions or concerns at this time. The patient declined [facility] ...COLLINS

## 2019-05-22 ENCOUNTER — TELEPHONE (OUTPATIENT)
Dept: PHARMACY | Facility: CLINIC | Age: 69
End: 2019-05-22

## 2019-06-20 ENCOUNTER — OFFICE VISIT (OUTPATIENT)
Dept: PULMONOLOGY | Facility: CLINIC | Age: 69
End: 2019-06-20
Payer: MEDICARE

## 2019-06-20 VITALS
BODY MASS INDEX: 27.94 KG/M2 | WEIGHT: 178 LBS | OXYGEN SATURATION: 93 % | HEIGHT: 67 IN | HEART RATE: 94 BPM | DIASTOLIC BLOOD PRESSURE: 75 MMHG | SYSTOLIC BLOOD PRESSURE: 134 MMHG

## 2019-06-20 DIAGNOSIS — J44.1 COPD EXACERBATION: ICD-10-CM

## 2019-06-20 DIAGNOSIS — J44.9 STEROID-DEPENDENT CHRONIC OBSTRUCTIVE PULMONARY DISEASE: ICD-10-CM

## 2019-06-20 DIAGNOSIS — Z92.241 STEROID-DEPENDENT CHRONIC OBSTRUCTIVE PULMONARY DISEASE: ICD-10-CM

## 2019-06-20 PROCEDURE — 99213 OFFICE O/P EST LOW 20 MIN: CPT | Mod: S$GLB,,, | Performed by: NURSE PRACTITIONER

## 2019-06-20 PROCEDURE — 99213 PR OFFICE/OUTPT VISIT, EST, LEVL III, 20-29 MIN: ICD-10-PCS | Mod: S$GLB,,, | Performed by: NURSE PRACTITIONER

## 2019-06-20 PROCEDURE — 99999 PR PBB SHADOW E&M-EST. PATIENT-LVL III: ICD-10-PCS | Mod: PBBFAC,,, | Performed by: NURSE PRACTITIONER

## 2019-06-20 PROCEDURE — 1101F PT FALLS ASSESS-DOCD LE1/YR: CPT | Mod: CPTII,S$GLB,, | Performed by: NURSE PRACTITIONER

## 2019-06-20 PROCEDURE — 1101F PR PT FALLS ASSESS DOC 0-1 FALLS W/OUT INJ PAST YR: ICD-10-PCS | Mod: CPTII,S$GLB,, | Performed by: NURSE PRACTITIONER

## 2019-06-20 PROCEDURE — 99999 PR PBB SHADOW E&M-EST. PATIENT-LVL III: CPT | Mod: PBBFAC,,, | Performed by: NURSE PRACTITIONER

## 2019-06-20 RX ORDER — PREDNISONE 20 MG/1
TABLET ORAL
Qty: 42 TABLET | Refills: 1 | Status: SHIPPED | OUTPATIENT
Start: 2019-06-20 | End: 2019-09-20 | Stop reason: SDUPTHER

## 2019-06-20 NOTE — PROGRESS NOTES
6/20/2019    Brad Nowak  Office Note    Chief Complaint   Patient presents with    Follow-up     3 months    Medication Refill     Prednisone 20mg       HPI:  June 20, 2019- Dupixent injections started Feb 2019, pt states no benefit, Breathing has worsened. Exercise tolerance has decreased. Currently taking 10 mg prednisone daily, taken prednisone 20 mg taper 5 x in 3 months.    03/20/2019-Breathing worsened in past month, on dupixent since January, Currently on Trelegy 1 puff daily and prednisone 10 mg daily every day.   No fever, no body aches, no pain, no wheeze, not able to do activities with out shortness of breath. Occasional non productive cough.     12/20/18- Breathing worsened, SOB with any exertion, not taking advair due to cost.  On long term prednisone therapy for uncontrolled severe persistent asthma. Eosinophil count artificially low due steroid use.    11/9/18- ER at [facility] for chest pain, chest tightness, elevated pulse rate of 130, Discharged  Dx: COPD exacerbation, treated with albuterol nebulizer. States Advair cost to high, $183 month, States recently retired.   Using Duo neb 2x daily, 2-3 albuterol inhaler daily.    9/20/18- pt presents in clinic alone, states taking prednisone 10 mg daily and 20 mg daily twice a week when SOB, has taken 40 mg a day once last week. Uses rescue inhaler most days after climbing stairs at work. Has trouble sleeping. Discussed fasenra and pharmacy unable to contact patient. States he does not carry phone at work during day, does not answer numbers he does not know, and has no voicemail set up at moment. States using Advair Diskus daily. Cough every few days, non productive. Worse at night.      July 5, 2018- on prednisone 5/d with 20/d up to 3 weekly.  Was in hospital December.  Breathing worse, saw asbestos  and advised 2nd opinion, to see Dr Rodriguez soon.  No fasenra as pharmacy unable to contact pt. Pt ues prednisone 5/d and not using trelegy-  has one puff left on 14 day sample given in April and relates trelegy not seems to help.  April 5 - increase prednisone from 5/d to 20/d since last yr - breathing still poor.  Rest good - but not better on prednisone 20/d.  No big improvement on incruse. Sinus better on flonase.     12/28/2017 pt very active, bikes to work with tool boxes with no problem. Had had hospital stays every 6 months til started prednisone continiously  5 yrs ago.  Pt recently flared with recent NSR stay. Pt had pft with fev1 50% at 1.53 liter 12/15/2017.  Very mahmood still.  On breo and singulair.  Use spiriva in past.     From consult last month:Plan:   Pt has eosinophillic asthma and copd.  Pt should respond to singulair.  Higher dose steroid action plan needed.  He would likely do very well with il5 rx.     outpt soon on prednisone 60/d x 7 with taper, going to breo 200, and singulair would be good with current rx.  Would not strop prednisone.  outpt f/u recommended. I do no see asbestos complications.  History of Present Illness:  Cc is sob x 3 days.   Pt worked refinery as .  Stopped smoking yrs ago. FH + asthma with no prior h/o asthma.  Has had cough and wheezes and sob going back prior to [place].  Pt noted needed freq steroids so was kept on prednisone 5 mg daily with need to increase dose ever 2-3 months because of increase sob.  Pt uses breo 100 and xopenex works better than ventolin.  Pt has chr nasal drainage but no bad infections.  abx not used.  He denies spiriva helped in past and denies using daliresp.    Pt rides bike to work - bike weighs 300 lbs with tools for trade.  Pt very active and still works - lives with daughter.    Reviewed note    HPI:      8/21/2018 - Pt with known COPD, asthma (sees Dr Eddy) here for second opinion regarding asbestos lung disease.  Started working on [employer] in 1968.  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  He started working as a  in 1987 until present time and reports that he has had asbestos exposure.  He denies working directly with asbestos but did work in the vicinity.  He reports that Dr Eddy thinks he has asbestos issues.  Has had PFT but doesn't remember when.  He has chronic SOB, SHOEMAKER (being addressed by Dr Eddy)  He only uses prn inhalers.  Has some chronic cough and wheezing.  Feels that SOB is worse but he rides bike around at work (weights about 200#).            The chief compliant  problem is worsening  PFSH:  Past Medical History:   Diagnosis Date    Bronchitis     COPD (chronic obstructive pulmonary disease)          Past Surgical History:   Procedure Laterality Date    HERNIA REPAIR      WRIST SURGERY       Social History     Tobacco Use    Smoking status: Former Smoker     Packs/day: 2.00     Years: 40.00     Pack years: 80.00     Last attempt to quit: 3/24/2009     Years since quitting: 10.2    Smokeless tobacco: Never Used   Substance Use Topics    Alcohol use: Yes     Comment: 6-12 beers/night; some days none.    Drug use: No     Family History   Problem Relation Age of Onset    Cancer Mother     Asthma Mother     Heart disease Father     COPD Father     Cancer Father     Cancer Brother         colon cancer    No Known Problems Brother     No Known Problems Brother      Review of patient's allergies indicates:  No Known Allergies  I have reviewed past medical, family, and social history. I have reviewed previous nurse notes.    Performance Status:The patient's activity level is household activities.        Review of Systems   Constitutional: Negative for activity change, appetite change, chills, diaphoresis, fatigue, fever and unexpected weight change.   HENT: Negative for dental problem, postnasal drip, rhinorrhea, sinus pressure, sinus pain, sneezing, sore throat, trouble swallowing and voice change.   "  Respiratory: Negative for apnea, and stridor.  Positive for cough, chest tightness, shortness of breath, wheezing   Cardiovascular: Negative for chest pain, palpitations and leg swelling.   Gastrointestinal: Negative for abdominal distention, occasional right abdominal and epigastric abdominal pain  Musculoskeletal: Negative for gait problem, myalgias and neck pain.   Skin: Negative for color change and pallor.   Allergic/Immunologic: Negative for environmental allergies and food allergies.   Neurological: Negative for dizziness, speech difficulty, weakness, light-headedness, numbness and headaches.   Hematological: Negative for adenopathy. Does not bruise/bleed easily.   Psychiatric/Behavioral: Negative for dysphoric mood and sleep disturbance. The patient is not nervous/anxious.           Exam:Comprehensive exam done. /75 (BP Location: Left arm, Patient Position: Sitting)   Pulse 94   Ht 5' 7" (1.702 m)   Wt 80.7 kg (178 lb 0.3 oz)   SpO2 (!) 93% Comment: on room air  BMI 27.88 kg/m²   Exam included Vitals as listed  Constitutional: He is oriented to person, place, and time. He appears well-developed. No distress.   Nose: Nose normal.   Mouth/Throat: Uvula is midline, oropharynx is clear and moist and mucous membranes are normal. No dental caries. No oropharyngeal exudate, posterior oropharyngeal edema, posterior oropharyngeal erythema or tonsillar abscesses.  Mallapatti (M) score II  Eyes: Pupils are equal, round, and reactive to light.   Neck: No JVD present. No thyromegaly present.   Cardiovascular: Normal rate, regular rhythm and normal heart sounds. Exam reveals no gallop and no friction rub.   No murmur heard.  Pulmonary/Chest: Effort normal and breath sounds normal. No accessory muscle usage or stridor. No apnea and no tachypnea. No respiratory distress, decreased breath sounds, wheezes, rhonchi, rales, or tenderness.   Abdominal: Soft. He exhibits no mass. There is no tenderness. No " hepatosplenomegaly, hernias and normoactive bowel sounds  Musculoskeletal: Normal range of motion. exhibits no edema.   Lymphadenopathy:     He has no cervical adenopathy.     He has no axillary adenopathy.   Neurological:  alert and oriented to person, place, and time. not disoriented.   Skin: Skin is warm and dry. Capillary refill takes less 2 sec. No cyanosis or erythema. No pallor. Nails show clubbing.   Psychiatric: normal mood and affect. behavior is normal. Judgment and thought content normal.       Radiographs (ct chest and cxr) reviewed: results reviewed   CTA of the chest with IV contrast PE protocol   1. No CT evidence for central or apparent peripheral pulmonary thromboembolus.     2. No aortic dissection or aneurysm identified.       3.  Centrilobular emphysema with bronchial wall thickening.  Phone report to Krishna Martinez M.D.    Electronically signed by: ROS CARRILLO MD  Date: 12/11/17     XR CHEST 1 VIEW03/20/2019   Negative chest x-ray           Labs reviewed       Lab Results   Component Value Date    WBC 13.40 (H) 03/20/2019    RBC 4.46 (L) 03/20/2019    HGB 13.5 (L) 03/20/2019    HCT 40.8 03/20/2019    MCV 92 03/20/2019    MCH 30.3 03/20/2019    MCHC 33.0 03/20/2019    RDW 13.6 03/20/2019     (H) 03/20/2019    MPV 8.1 (L) 03/20/2019    GRAN 11.0 (H) 03/20/2019    GRAN 82.3 (H) 03/20/2019    LYMPH 1.5 03/20/2019    LYMPH 11.6 (L) 03/20/2019    MONO 0.6 03/20/2019    MONO 4.5 03/20/2019    EOS 0.1 03/20/2019    BASO 0.10 03/20/2019    EOSINOPHIL 0.7 03/20/2019    BASOPHIL 0.9 03/20/2019   Results for MADELEINE EVANS (MRN [id number]) as of 12/20/2018 14:08   Ref. Range 11/8/2018 02:57   Sodium Latest Ref Range: 136 - 145 mmol/L 139   Potassium Latest Ref Range: 3.5 - 5.1 mmol/L 3.7   Chloride Latest Ref Range: 101 - 111 mmol/L 100 (L)   CO2 Latest Ref Range: 23 - 29 mmol/L 30 (H)   Anion Gap Latest Ref Range: 8 - 16 mmol/L 9   BUN, Bld Latest Ref Range: 8 - 23 mg/dL 15   Creatinine Latest Ref  Range: 0.5 - 1.4 mg/dL 0.8   eGFR if non African American Latest Ref Range: >60 mL/min/1.73 m^2 >60.0   eGFR if African American Latest Ref Range: >60 mL/min/1.73 m^2 >60.0   Glucose Latest Ref Range: 74 - 118 mg/dL 141 (H)   Calcium Latest Ref Range: 8.6 - 10.0 mg/dL 9.0   Alkaline Phosphatase Latest Ref Range: 38 - 126 U/L 44   Total Protein Latest Ref Range: 6.0 - 8.4 g/dL 7.4   Albumin Latest Ref Range: 3.5 - 5.2 g/dL 3.7   Total Bilirubin Latest Ref Range: 0.3 - 1.2 mg/dL 0.6   AST Latest Ref Range: 15 - 41 U/L 25   ALT Latest Ref Range: 17 - 63 U/L 30   Troponin I Latest Ref Range: 0.01 - 0.05 ng/mL <0.01   BNP Latest Ref Range: 0 - 99 pg/mL 26   Results for MADELEINE EVANS (MRN [id number]) as of 12/20/2018 14:08   Ref. Range 12/11/2017 05:14   Eos # Latest Ref Range: 0.0 - 0.5 K/uL 0.5       PFT results reviewed  Pulmonary Functions Testing Results:  PFT results reviewed fev1 50 % at 1.53 with no bd response 12/15/2017      6 min walk study was done December 20, 2018.  Baseline room air saturation is 94%.  With ambulation O2 sat did fall 90% but no lower.  Patient could walk 425 m.  Patient walked 82% of the reference  distance       Plan:  Clinical impression is resonably certain and repeated evaluation prn +/- follow up will be needed as below.    Madeleine was seen today for follow-up and medication refill.    Diagnoses and all orders for this visit:    Steroid-dependent chronic obstructive pulmonary disease  -     Complete PFT with bronchodilator; Future  -     predniSONE (DELTASONE) 20 MG tablet; Take 1-3 pills a day for worsening SOB as needed    COPD exacerbation  -     CT Chest Without Contrast; Future        Follow up in about 3 months (around 9/20/2019), or if symptoms worsen or fail to improve.    Discussed with patient above for education the following:      Patient Instructions   Dupixent therapy going on 6 months with no benefit, will stop for now and re evaluate breathing with a pulmonary function  test.    Continue current COPD medication regiment    CT of chest to evaluate for bronchiectasis (mucous filled lungs)

## 2019-06-20 NOTE — PATIENT INSTRUCTIONS
Dupixent therapy going on 6 months with no benefit, will stop for now and re evaluate breathing with a pulmonary function test.    Continue current COPD medication regiment    CT of chest to evaluate for bronchiectasis (mucous filled lungs)

## 2019-06-27 ENCOUNTER — TELEPHONE (OUTPATIENT)
Dept: PHARMACY | Facility: CLINIC | Age: 69
End: 2019-06-27

## 2019-07-12 ENCOUNTER — TELEPHONE (OUTPATIENT)
Dept: PULMONOLOGY | Facility: CLINIC | Age: 69
End: 2019-07-12

## 2019-07-12 NOTE — TELEPHONE ENCOUNTER
Pt notified pt verbalized understanding     ----- Message from Nidia Guevara NP sent at 7/11/2019  4:43 PM CDT -----  Ct of chest is stable and negative for bronchiectasis.

## 2019-07-22 DIAGNOSIS — J44.9 CHRONIC OBSTRUCTIVE PULMONARY DISEASE, UNSPECIFIED COPD TYPE: ICD-10-CM

## 2019-07-22 RX ORDER — IPRATROPIUM BROMIDE AND ALBUTEROL SULFATE 2.5; .5 MG/3ML; MG/3ML
3 SOLUTION RESPIRATORY (INHALATION) EVERY 6 HOURS PRN
Qty: 120 VIAL | Refills: 5 | Status: SHIPPED | OUTPATIENT
Start: 2019-07-22 | End: 2020-04-15 | Stop reason: SDUPTHER

## 2019-09-20 ENCOUNTER — OFFICE VISIT (OUTPATIENT)
Dept: PULMONOLOGY | Facility: CLINIC | Age: 69
End: 2019-09-20
Payer: MEDICARE

## 2019-09-20 VITALS
SYSTOLIC BLOOD PRESSURE: 158 MMHG | WEIGHT: 185.63 LBS | DIASTOLIC BLOOD PRESSURE: 74 MMHG | HEART RATE: 73 BPM | OXYGEN SATURATION: 95 % | BODY MASS INDEX: 29.13 KG/M2 | HEIGHT: 67 IN

## 2019-09-20 DIAGNOSIS — I70.0 AORTIC ATHEROSCLEROSIS: ICD-10-CM

## 2019-09-20 DIAGNOSIS — J44.9 CHRONIC OBSTRUCTIVE PULMONARY DISEASE, UNSPECIFIED COPD TYPE: ICD-10-CM

## 2019-09-20 DIAGNOSIS — J20.9 BRONCHITIS WITH BRONCHOSPASM: ICD-10-CM

## 2019-09-20 DIAGNOSIS — J44.9 STEROID-DEPENDENT CHRONIC OBSTRUCTIVE PULMONARY DISEASE: ICD-10-CM

## 2019-09-20 DIAGNOSIS — Z92.241 STEROID-DEPENDENT CHRONIC OBSTRUCTIVE PULMONARY DISEASE: ICD-10-CM

## 2019-09-20 PROCEDURE — 99999 PR PBB SHADOW E&M-EST. PATIENT-LVL III: CPT | Mod: PBBFAC,,, | Performed by: NURSE PRACTITIONER

## 2019-09-20 PROCEDURE — 99999 PR PBB SHADOW E&M-EST. PATIENT-LVL III: ICD-10-PCS | Mod: PBBFAC,,, | Performed by: NURSE PRACTITIONER

## 2019-09-20 PROCEDURE — 99214 OFFICE O/P EST MOD 30 MIN: CPT | Mod: S$GLB,,, | Performed by: NURSE PRACTITIONER

## 2019-09-20 PROCEDURE — 1101F PR PT FALLS ASSESS DOC 0-1 FALLS W/OUT INJ PAST YR: ICD-10-PCS | Mod: CPTII,S$GLB,, | Performed by: NURSE PRACTITIONER

## 2019-09-20 PROCEDURE — 99214 PR OFFICE/OUTPT VISIT, EST, LEVL IV, 30-39 MIN: ICD-10-PCS | Mod: S$GLB,,, | Performed by: NURSE PRACTITIONER

## 2019-09-20 PROCEDURE — 1101F PT FALLS ASSESS-DOCD LE1/YR: CPT | Mod: CPTII,S$GLB,, | Performed by: NURSE PRACTITIONER

## 2019-09-20 RX ORDER — FLUTICASONE PROPIONATE 50 MCG
1 SPRAY, SUSPENSION (ML) NASAL DAILY
Qty: 1 BOTTLE | Refills: 11 | Status: SHIPPED | OUTPATIENT
Start: 2019-09-20 | End: 2023-05-02 | Stop reason: SDUPTHER

## 2019-09-20 RX ORDER — PREDNISONE 20 MG/1
TABLET ORAL
Qty: 42 TABLET | Refills: 1 | Status: SHIPPED | OUTPATIENT
Start: 2019-09-20 | End: 2019-11-06 | Stop reason: SDUPTHER

## 2019-09-20 NOTE — PROGRESS NOTES
9/20/2019    Brad Nowak  Office Note    Chief Complaint   Patient presents with    Follow-up     3 months    Shortness of Breath       HPI:   September 20, 2019- SOB worsening, using albuterol nebulizer 1-2 x daily every day. Currently on Prednisone 10 mg daily, with 20mg taper when needed. States having to breath out of mouth to exhale. Stopped Dupixent due to lack of benefit.     June 20, 2019- Dupixent injections started Feb 2019, pt states no benefit, Breathing has worsened. Exercise tolerance has decreased. Currently taking 10 mg prednisone daily, taken prednisone 20 mg taper 5 x in 3 months.    03/20/2019-Breathing worsened in past month, on dupixent since January, Currently on Trelegy 1 puff daily and prednisone 10 mg daily every day.   No fever, no body aches, no pain, no wheeze, not able to do activities with out shortness of breath. Occasional non productive cough.     12/20/18- Breathing worsened, SOB with any exertion, not taking advair due to cost.  On long term prednisone therapy for uncontrolled severe persistent asthma. Eosinophil count artificially low due steroid use.    11/9/18- ER at [place] for chest pain, chest tightness, elevated pulse rate of 130, Discharged  Dx: COPD exacerbation, treated with albuterol nebulizer. States Advair cost to high, $183 month, States recently retired.   Using Duo neb 2x daily, 2-3 albuterol inhaler daily.    9/20/18- pt presents in clinic alone, states taking prednisone 10 mg daily and 20 mg daily twice a week when SOB, has taken 40 mg a day once last week. Uses rescue inhaler most days after climbing stairs at work. Has trouble sleeping. Discussed fasenra and pharmacy unable to contact patient. States he does not carry phone at work during day, does not answer numbers he does not know, and has no voicemail set up at moment. States using Advair Diskus daily. Cough every few days, non productive. Worse at night.      July 5, 2018- on prednisone 5/d with  20/d up to 3 weekly.  Was in hospital December.  Breathing worse, saw asbestos  and advised 2nd opinion, to see Dr Rodriguez soon.  No fasenra as pharmacy unable to contact pt. Pt ues prednisone 5/d and not using trelegy- has one puff left on 14 day sample given in April and relates trelegy not seems to help.  April 5 - increase prednisone from 5/d to 20/d since last yr - breathing still poor.  Rest good - but not better on prednisone 20/d.  No big improvement on incruse. Sinus better on flonase.     12/28/2017 pt very active, bikes to work with tool boxes with no problem. Had had hospital stays every 6 months til started prednisone continiously  5 yrs ago.  Pt recently flared with recent NSR stay. Pt had pft with fev1 50% at 1.53 liter 12/15/2017.  Very mahmood still.  On breo and singulair.  Use spiriva in past.     From consult last month:Plan:   Pt has eosinophillic asthma and copd.  Pt should respond to singulair.  Higher dose steroid action plan needed.  He would likely do very well with il5 rx.     outpt soon on prednisone 60/d x 7 with taper, going to breo 200, and singulair would be good with current rx.  Would not strop prednisone.  outpt f/u recommended. I do no see asbestos complications.  History of Present Illness:  Cc is sob x 3 days.   Pt worked refinery as .  Stopped smoking yrs ago. FH + asthma with no prior h/o asthma.  Has had cough and wheezes and sob going back prior to [place].  Pt noted needed freq steroids so was kept on prednisone 5 mg daily with need to increase dose ever 2-3 months because of increase sob.  Pt uses breo 100 and xopenex works better than ventolin.  Pt has chr nasal drainage but no bad infections.  abx not used.  He denies spiriva helped in past and denies using daliresp.    Pt rides bike to work - bike weighs 300 lbs with tools for trade.  Pt very active and still works - lives with daughter.    Reviewed note    HPI:      8/21/2018 - Pt with known  COPD, asthma (sees Dr Eddy) here for second opinion regarding asbestos lung disease.  Started working on [employer] in 1968.  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  He started working as a  in 1987 until present time and reports that he has had asbestos exposure.  He denies working directly with asbestos but did work in the vicinity.  He reports that Dr Eddy thinks he has asbestos issues.  Has had PFT but doesn't remember when.  He has chronic SOB, SHOEMAKER (being addressed by Dr Eddy)  He only uses prn inhalers.  Has some chronic cough and wheezing.  Feels that SOB is worse but he rides bike around at work (weights about 200#).            The chief compliant  problem is worsening  PFSH:  Past Medical History:   Diagnosis Date    Bronchitis     COPD (chronic obstructive pulmonary disease)          Past Surgical History:   Procedure Laterality Date    HERNIA REPAIR      WRIST SURGERY       Social History     Tobacco Use    Smoking status: Former Smoker     Packs/day: 2.00     Years: 40.00     Pack years: 80.00     Last attempt to quit: 3/24/2009     Years since quitting: 10.4    Smokeless tobacco: Never Used   Substance Use Topics    Alcohol use: Yes     Comment: 6-12 beers/night; some days none.    Drug use: No     Family History   Problem Relation Age of Onset    Cancer Mother     Asthma Mother     Heart disease Father     COPD Father     Cancer Father     Cancer Brother         colon cancer    No Known Problems Brother     No Known Problems Brother      Review of patient's allergies indicates:  No Known Allergies  I have reviewed past medical, family, and social history. I have reviewed previous nurse notes.    Performance Status:The patient's activity level is household activities.        Review of Systems   Constitutional: Negative for activity change, appetite change,  "chills, diaphoresis, fatigue, fever and unexpected weight change.   HENT: Negative for dental problem, postnasal drip, rhinorrhea, sinus pain, sneezing, sore throat, trouble swallowing and voice change.  Positive for sinus pressure,   Respiratory: Negative for apnea, cough, chest tightness, and stridor.  Positive for shortness of breath, wheezing   Cardiovascular: Negative for chest pain, palpitations and leg swelling.   Musculoskeletal: Negative for gait problem, myalgias and neck pain.   Skin: Negative for color change and pallor.    Allergic/Immunologic: Negative for environmental allergies and food allergies.   Neurological: Negative for dizziness, speech difficulty, weakness, light-headedness, numbness and headaches.   Hematological: Negative for adenopathy. Does not bruise/bleed easily.   Psychiatric/Behavioral: Negative for dysphoric mood and sleep disturbance. The patient is not nervous/anxious.           Exam:Comprehensive exam done. BP (!) 158/74 (BP Location: Left arm, Patient Position: Sitting)   Pulse 73   Ht 5' 7" (1.702 m)   Wt 84.2 kg (185 lb 10 oz)   SpO2 95% Comment: on room air  BMI 29.07 kg/m²   Exam included Vitals as listed  Constitutional: He is oriented to person, place, and time. He appears well-developed. No distress.   Nose: Nose normal.   Mouth/Throat: Uvula is midline, oropharynx is clear and moist and mucous membranes are normal. No dental caries. No oropharyngeal exudate, posterior oropharyngeal edema, posterior oropharyngeal erythema or tonsillar abscesses.  Mallapatti (M) score II  Eyes: Pupils are equal, round, and reactive to light.   Neck: No JVD present. No thyromegaly present.   Cardiovascular: Normal rate, regular rhythm and normal heart sounds. Exam reveals no gallop and no friction rub.   No murmur heard.  Pulmonary/Chest: Effort normal and breath sounds normal. No accessory muscle usage or stridor. No apnea and no tachypnea. No respiratory distress, decreased breath " "sounds, wheezes, rhonchi, rales, or tenderness.   Abdominal: Soft. He exhibits no mass. There is no tenderness. No hepatosplenomegaly, hernias and normoactive bowel sounds  Musculoskeletal: Normal range of motion. exhibits no edema.   Lymphadenopathy:     He has no cervical adenopathy.   Neurological:  alert and oriented to person, place, and time. not disoriented.   Skin: Skin is warm and dry. Capillary refill takes less 2 sec. No cyanosis or erythema. No pallor. Nails show clubbing.   Psychiatric: normal mood and affect. behavior is normal. Judgment and thought content normal.       Radiographs (ct chest and cxr) reviewed: results reviewed   CT Chest Without Contrast 07/09/2019  Viewed by direct vision, poor quality images 74 images total not a sufficient study.  The lungs are hyperexpanded with bullous emphysematous changes seen in the upper lobes.  The lower lobes demonstrate prominent interlobar septa.  There are few patchy" bud in tree "appearance seen in the left lower lobe.  There are no pulmonary nodules or suspicious masses.  A small bilateral pleural effusion is present which could be physiologic.    The heart is normal in size however demonstrates coronary artery calcifications.  The tracheobronchial airway is slightly dilated.  There is shotty lymph nodes noted of the mediastinum.  Note is made of calcifications of the thoracic aorta.         XR CHEST 1 VIEW03/20/2019   Negative chest x-ray     Labs reviewed       Lab Results   Component Value Date    WBC 13.40 (H) 03/20/2019    RBC 4.46 (L) 03/20/2019    HGB 13.5 (L) 03/20/2019    HCT 40.8 03/20/2019    MCV 92 03/20/2019    MCH 30.3 03/20/2019    MCHC 33.0 03/20/2019    RDW 13.6 03/20/2019     (H) 03/20/2019    MPV 8.1 (L) 03/20/2019    GRAN 11.0 (H) 03/20/2019    GRAN 82.3 (H) 03/20/2019    LYMPH 1.5 03/20/2019    LYMPH 11.6 (L) 03/20/2019    MONO 0.6 03/20/2019    MONO 4.5 03/20/2019    EOS 0.1 03/20/2019    BASO 0.10 03/20/2019    EOSINOPHIL " 0.7 03/20/2019    BASOPHIL 0.9 03/20/2019     Results for MADELEINE EVANS (MRN [id number]) as of 12/20/2018 14:08   Ref. Range 12/11/2017 05:14   Eos # Latest Ref Range: 0.0 - 0.5 K/uL 0.5       PFT results reviewed  Pulmonary Functions Testing Results:  PFT results reviewed fev1 50 % at 1.53 with no bd response 12/15/2017      Spirometry bronchodilator, lung volume by gas dilution, diffusion capacity measured July 9, 2019.  The FEV1 to FVC ratio was only 54%, this indicates airflow obstruction.  The FEV1 measured 59% predicted at 1.75 L.  The of 5% improvement in the FEV1   following bronchodilator was not statistically significant, 12% seem to for statistical significance.  Total lung capacity was normal.  Diffusion was low, uncorrected for anemia present, at 61%.     There is  severe airflow obstruction, no bronchodilator response, no restriction, and loss of diffusion measured.  Clinical correlation recommended.          6 min walk study was done December 20, 2018.  Baseline room air saturation is 94%.  With ambulation O2 sat did fall 90% but no lower.  Patient could walk 425 m.  Patient walked 82% of the reference  distance     Overnight pulse oximetry 4/16/2019- negative for desaturation, inaccurate test, total test time 2 hour 15 min 7 am-9am. Pt states monitor continuously fell off hand, retaped it himself with paper tape due to not sticking.     Plan:  Clinical impression is resonably certain and repeated evaluation prn +/- follow up will be needed as below.    Madeleine was seen today for follow-up and shortness of breath.    Diagnoses and all orders for this visit:    Aortic atherosclerosis  -     CT Chest Without Contrast; Future    Bronchitis with bronchospasm  -     Pulse oximetry overnight; Future  -     fluticasone propionate (FLONASE) 50 mcg/actuation nasal spray; 1 spray (50 mcg total) by Each Nostril route once daily.    Chronic obstructive pulmonary disease, unspecified COPD type  -     Pulse oximetry  overnight; Future  -     fluticasone propionate (FLONASE) 50 mcg/actuation nasal spray; 1 spray (50 mcg total) by Each Nostril route once daily.  -     CT Chest Without Contrast; Future    Steroid-dependent chronic obstructive pulmonary disease  -     Pulse oximetry overnight; Future  -     fluticasone propionate (FLONASE) 50 mcg/actuation nasal spray; 1 spray (50 mcg total) by Each Nostril route once daily.  -     CT Chest Without Contrast; Future  -     predniSONE (DELTASONE) 20 MG tablet; Take 1-3 pills a day for worsening SOB as needed        Follow up in about 3 months (around 12/20/2019), or if symptoms worsen or fail to improve.    Discussed with patient above for education the following:      Patient Instructions   Will repeat CT of chest in 3 months, images from previous are of poor quality    Repeat overnight pulse test, if you qualify for supplemental oxygen will order   And order a non invasive ventilator to assist your breathing at night.    Continue current medications for cholesterol Lipitor daily, calcification of the major artery of heart call Aorta is seen on last CT scan, will continue to monitor.    Continue current COPD medication management.

## 2019-09-20 NOTE — PATIENT INSTRUCTIONS
Will repeat CT of chest in 3 months, images from previous are of poor quality    Repeat overnight pulse test, if you qualify for supplemental oxygen will order   And order a non invasive ventilator to assist your breathing at night.    Continue current medications for cholesterol Lipitor daily, calcification of the major artery of heart call Aorta is seen on last CT scan, will continue to monitor.    Continue current COPD medication management.

## 2019-09-23 ENCOUNTER — TELEPHONE (OUTPATIENT)
Dept: PULMONOLOGY | Facility: CLINIC | Age: 69
End: 2019-09-23

## 2019-09-23 NOTE — TELEPHONE ENCOUNTER
"CT rescheduled in 3months per NP office note on 9/20/19 "Will repeat CT of chest in 3 months" scheduled follow up appt with NP on 12/20/19 with CT prior to appt. Faxed orders for overnight pulse ox to [facility]. Pt verbalized understanding   "

## 2019-11-06 DIAGNOSIS — J44.9 STEROID-DEPENDENT CHRONIC OBSTRUCTIVE PULMONARY DISEASE: ICD-10-CM

## 2019-11-06 DIAGNOSIS — Z92.241 STEROID-DEPENDENT CHRONIC OBSTRUCTIVE PULMONARY DISEASE: ICD-10-CM

## 2019-11-06 RX ORDER — PREDNISONE 20 MG/1
TABLET ORAL
Qty: 42 TABLET | Refills: 1 | Status: SHIPPED | OUTPATIENT
Start: 2019-11-06 | End: 2020-02-11

## 2019-12-20 ENCOUNTER — HOSPITAL ENCOUNTER (OUTPATIENT)
Dept: RADIOLOGY | Facility: HOSPITAL | Age: 69
Discharge: HOME | End: 2019-12-20
Attending: NURSE PRACTITIONER
Payer: MEDICARE

## 2019-12-20 ENCOUNTER — OFFICE VISIT (OUTPATIENT)
Dept: PULMONOLOGY | Facility: CLINIC | Age: 69
End: 2019-12-20
Payer: MEDICARE

## 2019-12-20 VITALS
WEIGHT: 178.38 LBS | SYSTOLIC BLOOD PRESSURE: 166 MMHG | HEART RATE: 80 BPM | BODY MASS INDEX: 28 KG/M2 | DIASTOLIC BLOOD PRESSURE: 81 MMHG | OXYGEN SATURATION: 99 % | HEIGHT: 67 IN

## 2019-12-20 DIAGNOSIS — I70.0 AORTIC ATHEROSCLEROSIS: ICD-10-CM

## 2019-12-20 DIAGNOSIS — J43.9 PULMONARY EMPHYSEMA, UNSPECIFIED EMPHYSEMA TYPE: Primary | ICD-10-CM

## 2019-12-20 DIAGNOSIS — J44.9 CHRONIC OBSTRUCTIVE PULMONARY DISEASE, UNSPECIFIED COPD TYPE: ICD-10-CM

## 2019-12-20 DIAGNOSIS — Z92.241 STEROID-DEPENDENT CHRONIC OBSTRUCTIVE PULMONARY DISEASE: ICD-10-CM

## 2019-12-20 DIAGNOSIS — I50.32 CHRONIC DIASTOLIC HEART FAILURE: ICD-10-CM

## 2019-12-20 DIAGNOSIS — J44.9 STEROID-DEPENDENT CHRONIC OBSTRUCTIVE PULMONARY DISEASE: ICD-10-CM

## 2019-12-20 PROCEDURE — 71250 CT CHEST WITHOUT CONTRAST: ICD-10-PCS | Mod: 26,,, | Performed by: RADIOLOGY

## 2019-12-20 PROCEDURE — 99999 PR PBB SHADOW E&M-EST. PATIENT-LVL IV: ICD-10-PCS | Mod: PBBFAC,,, | Performed by: NURSE PRACTITIONER

## 2019-12-20 PROCEDURE — 1126F AMNT PAIN NOTED NONE PRSNT: CPT | Mod: S$GLB,,, | Performed by: NURSE PRACTITIONER

## 2019-12-20 PROCEDURE — 71250 CT THORAX DX C-: CPT | Mod: TC

## 2019-12-20 PROCEDURE — 71250 CT THORAX DX C-: CPT | Mod: 26,,, | Performed by: RADIOLOGY

## 2019-12-20 PROCEDURE — 99214 OFFICE O/P EST MOD 30 MIN: CPT | Mod: S$GLB,,, | Performed by: NURSE PRACTITIONER

## 2019-12-20 PROCEDURE — 99999 PR PBB SHADOW E&M-EST. PATIENT-LVL IV: CPT | Mod: PBBFAC,,, | Performed by: NURSE PRACTITIONER

## 2019-12-20 PROCEDURE — 1159F PR MEDICATION LIST DOCUMENTED IN MEDICAL RECORD: ICD-10-PCS | Mod: S$GLB,,, | Performed by: NURSE PRACTITIONER

## 2019-12-20 PROCEDURE — 1159F MED LIST DOCD IN RCRD: CPT | Mod: S$GLB,,, | Performed by: NURSE PRACTITIONER

## 2019-12-20 PROCEDURE — 1126F PR PAIN SEVERITY QUANTIFIED, NO PAIN PRESENT: ICD-10-PCS | Mod: S$GLB,,, | Performed by: NURSE PRACTITIONER

## 2019-12-20 PROCEDURE — 99214 PR OFFICE/OUTPT VISIT, EST, LEVL IV, 30-39 MIN: ICD-10-PCS | Mod: S$GLB,,, | Performed by: NURSE PRACTITIONER

## 2019-12-20 RX ORDER — PREDNISONE 20 MG/1
TABLET ORAL
Qty: 20 TABLET | Refills: 0 | Status: SHIPPED | OUTPATIENT
Start: 2019-12-20 | End: 2020-02-11

## 2019-12-20 RX ORDER — PREDNISONE 10 MG/1
TABLET ORAL
Qty: 40 TABLET | Refills: 0 | Status: SHIPPED | OUTPATIENT
Start: 2019-12-20 | End: 2020-02-11

## 2019-12-20 NOTE — PATIENT INSTRUCTIONS
50 mg of prednisone is to high and not safe, the medication is not helping with your SOB and  will have to be tapered slowly    Go to 20 mg pills two times a day for 1 week, the 20 mg daily for 2 weeks. Then back to 10mg  A day.     Referral to cardiology for reevaluation of Echo from 2017 that shows mild heart failure, could have worsened in past few years     Review of ct show stable lungs with severe Emphysema when compaired to the 2017 CT. The nodule has not changed in 2 years but does require further evaluation in future

## 2019-12-20 NOTE — PROGRESS NOTES
12/20/2019    Brad Nowak  Office Note    Chief Complaint   Patient presents with    Follow-up    Results    Shortness of Breath       HPI:   December 20, 2019- received oxygen concentrator, states does not improve SOB during day, SOB- stable, daily problem, worse with exertion, improves with nebulized Albuterol. Increased Prednisone himself to 10 mg in am and 40 mg nightly every night for last 3 weeks.      September 20, 2019- SOB worsening, using albuterol nebulizer 1-2 x daily every day. Currently on Prednisone 10 mg daily, with 20mg taper when needed. States having to breath out of mouth to exhale. Stopped Dupixent due to lack of benefit.     June 20, 2019- Dupixent injections started Feb 2019, pt states no benefit, Breathing has worsened. Exercise tolerance has decreased. Currently taking 10 mg prednisone daily, taken prednisone 20 mg taper 5 x in 3 months.    03/20/2019-Breathing worsened in past month, on dupixent since January, Currently on Trelegy 1 puff daily and prednisone 10 mg daily every day.   No fever, no body aches, no pain, no wheeze, not able to do activities with out shortness of breath. Occasional non productive cough.     12/20/18- Breathing worsened, SOB with any exertion, not taking advair due to cost.  On long term prednisone therapy for uncontrolled severe persistent asthma. Eosinophil count artificially low due steroid use.    11/9/18- ER at [place] for chest pain, chest tightness, elevated pulse rate of 130, Discharged  Dx: COPD exacerbation, treated with albuterol nebulizer. States Advair cost to high, $183 month, States recently retired.   Using Duo neb 2x daily, 2-3 albuterol inhaler daily.    9/20/18- pt presents in clinic alone, states taking prednisone 10 mg daily and 20 mg daily twice a week when SOB, has taken 40 mg a day once last week. Uses rescue inhaler most days after climbing stairs at work. Has trouble sleeping. Discussed fasenra and pharmacy unable to contact  patient. States he does not carry phone at work during day, does not answer numbers he does not know, and has no voicemail set up at moment. States using Advair Diskus daily. Cough every few days, non productive. Worse at night.      July 5, 2018- on prednisone 5/d with 20/d up to 3 weekly.  Was in hospital December.  Breathing worse, saw asbestos  and advised 2nd opinion, to see Dr Rodriguez soon.  No fasenra as pharmacy unable to contact pt. Pt ues prednisone 5/d and not using trelegy- has one puff left on 14 day sample given in April and relates trelegy not seems to help.  April 5 - increase prednisone from 5/d to 20/d since last yr - breathing still poor.  Rest good - but not better on prednisone 20/d.  No big improvement on incruse. Sinus better on flonase.     12/28/2017 pt very active, bikes to work with tool boxes with no problem. Had had hospital stays every 6 months til started prednisone continiously  5 yrs ago.  Pt recently flared with recent NSR stay. Pt had pft with fev1 50% at 1.53 liter 12/15/2017.  Very mahmood still.  On breo and singulair.  Use spiriva in past.     From consult last month:Plan:   Pt has eosinophillic asthma and copd.  Pt should respond to singulair.  Higher dose steroid action plan needed.  He would likely do very well with il5 rx.     outpt soon on prednisone 60/d x 7 with taper, going to breo 200, and singulair would be good with current rx.  Would not strop prednisone.  outpt f/u recommended. I do no see asbestos complications.  History of Present Illness:  Cc is sob x 3 days.   Pt worked refinery as .  Stopped smoking yrs ago. FH + asthma with no prior h/o asthma.  Has had cough and wheezes and sob going back prior to [place].  Pt noted needed freq steroids so was kept on prednisone 5 mg daily with need to increase dose ever 2-3 months because of increase sob.  Pt uses breo 100 and xopenex works better than ventolin.  Pt has chr nasal drainage but no bad  infections.  abx not used.  He denies spiriva helped in past and denies using daliresp.    Pt rides bike to work - bike weighs 300 lbs with tools for trade.  Pt very active and still works - lives with daughter.    Reviewed note    HPI:      8/21/2018 - Pt with known COPD, asthma (sees Dr Eddy) here for second opinion regarding asbestos lung disease.  Started working on [employer] in 1968.  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  He started working as a  in 1987 until present time and reports that he has had asbestos exposure.  He denies working directly with asbestos but did work in the vicinity.  He reports that Dr Eddy thinks he has asbestos issues.  Has had PFT but doesn't remember when.  He has chronic SOB, SOHEMAKER (being addressed by Dr Eddy)  He only uses prn inhalers.  Has some chronic cough and wheezing.  Feels that SOB is worse but he rides bike around at work (weights about 200#).        The chief compliant  problem is stable    PFSH:  Past Medical History:   Diagnosis Date    Bronchitis     COPD (chronic obstructive pulmonary disease)          Past Surgical History:   Procedure Laterality Date    HERNIA REPAIR      WRIST SURGERY       Social History     Tobacco Use    Smoking status: Former Smoker     Packs/day: 2.00     Years: 40.00     Pack years: 80.00     Last attempt to quit: 3/24/2009     Years since quitting: 10.7    Smokeless tobacco: Never Used   Substance Use Topics    Alcohol use: Yes     Comment: 6-12 beers/night; some days none.    Drug use: No     Family History   Problem Relation Age of Onset    Cancer Mother     Asthma Mother     Heart disease Father     COPD Father     Cancer Father     Cancer Brother         colon cancer    No Known Problems Brother     No Known Problems Brother      Review of patient's allergies indicates:  No  "Known Allergies  I have reviewed past medical, family, and social history. I have reviewed previous nurse notes.    Performance Status:The patient's activity level is household activities.        Review of Systems   Constitutional: Negative for activity change, appetite change, chills, diaphoresis, fatigue, fever and unexpected weight change.   HENT: Negative for dental problem, postnasal drip, rhinorrhea, sinus pain, sneezing, sore throat, trouble swallowing and voice change.  Positive for sinus pressure,   Respiratory: Negative for apnea, cough, chest tightness, and stridor.  Positive for shortness of breath, wheezing   Cardiovascular: Negative for chest pain, palpitations and leg swelling.   Musculoskeletal: Negative for gait problem, myalgias and neck pain.   Skin: Negative for color change and pallor.    Allergic/Immunologic: Negative for environmental allergies and food allergies.   Neurological: Negative for dizziness, speech difficulty, weakness, light-headedness, numbness and headaches.   Hematological: Negative for adenopathy. Does not bruise/bleed easily.   Psychiatric/Behavioral: Negative for dysphoric mood and sleep disturbance. The patient is not nervous/anxious.           Exam:Comprehensive exam done. BP (!) 166/81 (BP Location: Right arm, Patient Position: Sitting)   Pulse 80   Ht 5' 7" (1.702 m)   Wt 80.9 kg (178 lb 5.6 oz)   SpO2 99% Comment: on room air  BMI 27.93 kg/m²   Exam included Vitals as listed  Constitutional: He is oriented to person, place, and time. He appears well-developed. No distress.   Nose: Nose normal.   Mouth/Throat: Uvula is midline, oropharynx is clear and moist and mucous membranes are normal. No dental caries. No oropharyngeal exudate, posterior oropharyngeal edema, posterior oropharyngeal erythema or tonsillar abscesses.  Mallapatti (M) score II  Eyes: Pupils are equal, round, and reactive to light.   Neck: No JVD present. No thyromegaly present.   Cardiovascular: " "Normal rate, regular rhythm and normal heart sounds. Exam reveals no gallop and no friction rub.   No murmur heard.  Pulmonary/Chest: Effort normal and breath sounds normal. No accessory muscle usage or stridor. No apnea and no tachypnea. No respiratory distress, decreased breath sounds, wheezes, rhonchi, rales, or tenderness.   Abdominal: Soft. He exhibits no mass. There is no tenderness. No hepatosplenomegaly, hernias and normoactive bowel sounds  Musculoskeletal: Normal range of motion. exhibits no edema.   Lymphadenopathy:     He has no cervical adenopathy.   Neurological:  alert and oriented to person, place, and time. not disoriented.   Skin: Skin is warm and dry. Capillary refill takes less 2 sec. No cyanosis or erythema. No pallor. Nails show clubbing.   Psychiatric: normal mood and affect. behavior is normal. Judgment and thought content normal.       Radiographs (ct chest and cxr) reviewed: results reviewed by direct vision  CT Chest Without Contrast 12/20/2019   Emphysema.  Atherosclerotic calcification of the aorta, origins of the right and left subclavian arteries and coronary arteries.    CT Chest Without Contrast 07/09/2019  Viewed by direct vision, poor quality images 74 images total not a sufficient study.  The lungs are hyperexpanded with bullous emphysematous changes seen in the upper lobes.  The lower lobes demonstrate prominent interlobar septa.  There are few patchy" bud in tree "appearance seen in the left lower lobe.  There are no pulmonary nodules or suspicious masses.  A small bilateral pleural effusion is present which could be physiologic.    The heart is normal in size however demonstrates coronary artery calcifications.  The tracheobronchial airway is slightly dilated.  There is shotty lymph nodes noted of the mediastinum.  Note is made of calcifications of the thoracic aorta.     XR CHEST 1 VIEW03/20/2019   Negative chest x-ray     2D ECHO WITH COLOR FLOW DOPPLER 12/15/2017   3 - " Normal left ventricular systolic function (EF 65-70%).     4 - Impaired LV relaxation, normal LAP (grade 1 diastolic dysfunction).       Labs reviewed       Lab Results   Component Value Date    WBC 13.40 (H) 03/20/2019    RBC 4.46 (L) 03/20/2019    HGB 13.5 (L) 03/20/2019    HCT 40.8 03/20/2019    MCV 92 03/20/2019    MCH 30.3 03/20/2019    MCHC 33.0 03/20/2019    RDW 13.6 03/20/2019     (H) 03/20/2019    MPV 8.1 (L) 03/20/2019    GRAN 11.0 (H) 03/20/2019    GRAN 82.3 (H) 03/20/2019    LYMPH 1.5 03/20/2019    LYMPH 11.6 (L) 03/20/2019    MONO 0.6 03/20/2019    MONO 4.5 03/20/2019    EOS 0.1 03/20/2019    BASO 0.10 03/20/2019    EOSINOPHIL 0.7 03/20/2019    BASOPHIL 0.9 03/20/2019     Results for MADELEINE EVANS (MRN [id number]) as of 12/20/2018 14:08   Ref. Range 12/11/2017 05:14   Eos # Latest Ref Range: 0.0 - 0.5 K/uL 0.5       PFT results reviewed  Pulmonary Functions Testing Results:  PFT results reviewed fev1 50 % at 1.53 with no bd response 12/15/2017      Spirometry bronchodilator, lung volume by gas dilution, diffusion capacity measured July 9, 2019.  The FEV1 to FVC ratio was only 54%, this indicates airflow obstruction.  The FEV1 measured 59% predicted at 1.75 L.  The of 5% improvement in the FEV1   following bronchodilator was not statistically significant, 12% seem to for statistical significance.  Total lung capacity was normal.  Diffusion was low, uncorrected for anemia present, at 61%.     There is  severe airflow obstruction, no bronchodilator response, no restriction, and loss of diffusion measured.  Clinical correlation recommended.          6 min walk study was done December 20, 2018.  Baseline room air saturation is 94%.  With ambulation O2 sat did fall 90% but no lower.  Patient could walk 425 m.  Patient walked 82% of the reference  distance     I spent over 30 mins of time with the patient. Reviewed results of the recently ordered labs, tests and studies; made directives with regards to  the results. Over half of this time was spent couseling and coordinating care.     I have explained all of the above in detail and the patient understands all of the current recommendation(s). I have answered all of their questions to the best of my ability and to their complete satisfaction.   The patient is to continue with the current management plan.      Plan:  Clinical impression is resonably certain and repeated evaluation prn +/- follow up will be needed as below.    Brad was seen today for follow-up, results and shortness of breath.    Diagnoses and all orders for this visit:    Pulmonary emphysema, unspecified emphysema type  -     predniSONE (DELTASONE) 20 MG tablet; Take 1 pill twice daily for 10 days.  -     predniSONE (DELTASONE) 10 MG tablet; 1 pill twice a day for 10 days, then 1 pill daily    Chronic diastolic heart failure  -     Ambulatory Referral to Cardiology        Follow up in about 3 months (around 3/20/2020), or if symptoms worsen or fail to improve.    Discussed with patient above for education the following:      Patient Instructions   50 mg of prednisone is to high and not safe, the medication is not helping with your SOB and  will have to be tapered slowly    Go to 20 mg pills two times a day for 1 week, the 20 mg daily for 2 weeks. Then back to 10mg  A day.     Referral to cardiology for reevaluation of Echo from 2017 that shows mild heart failure, could have worsened in past few years     Review of ct show stable lungs with severe Emphysema when compaired to the 2017 CT. The nodule has not changed in 2 years but does require further evaluation in future

## 2019-12-28 ENCOUNTER — PATIENT MESSAGE (OUTPATIENT)
Dept: PULMONOLOGY | Facility: CLINIC | Age: 69
End: 2019-12-28

## 2020-01-17 DIAGNOSIS — J43.9 PULMONARY EMPHYSEMA, UNSPECIFIED EMPHYSEMA TYPE: Primary | ICD-10-CM

## 2020-01-17 RX ORDER — FLUTICASONE FUROATE AND VILANTEROL 200; 25 UG/1; UG/1
1 POWDER RESPIRATORY (INHALATION) DAILY
Qty: 1 EACH | Refills: 11 | Status: SHIPPED | OUTPATIENT
Start: 2020-01-17 | End: 2020-04-15 | Stop reason: ALTCHOICE

## 2020-01-17 RX ORDER — ALBUTEROL SULFATE 90 UG/1
2 AEROSOL, METERED RESPIRATORY (INHALATION) EVERY 4 HOURS PRN
Qty: 1 EACH | Refills: 11 | Status: SHIPPED | OUTPATIENT
Start: 2020-01-17 | End: 2020-01-22 | Stop reason: SDUPTHER

## 2020-01-22 DIAGNOSIS — J44.1 ACUTE EXACERBATION OF COPD WITH ASTHMA: ICD-10-CM

## 2020-01-22 DIAGNOSIS — J45.901 ACUTE EXACERBATION OF COPD WITH ASTHMA: ICD-10-CM

## 2020-01-22 DIAGNOSIS — J44.9 STEROID-DEPENDENT CHRONIC OBSTRUCTIVE PULMONARY DISEASE: ICD-10-CM

## 2020-01-22 DIAGNOSIS — Z92.241 STEROID-DEPENDENT CHRONIC OBSTRUCTIVE PULMONARY DISEASE: ICD-10-CM

## 2020-01-22 DIAGNOSIS — J43.9 PULMONARY EMPHYSEMA, UNSPECIFIED EMPHYSEMA TYPE: ICD-10-CM

## 2020-01-22 RX ORDER — PREDNISONE 5 MG/1
10 TABLET ORAL DAILY
Qty: 60 TABLET | Refills: 11 | Status: ON HOLD | OUTPATIENT
Start: 2020-01-22 | End: 2020-05-06 | Stop reason: HOSPADM

## 2020-01-22 RX ORDER — ALBUTEROL SULFATE 90 UG/1
2 AEROSOL, METERED RESPIRATORY (INHALATION) EVERY 4 HOURS PRN
Qty: 1 EACH | Refills: 11 | Status: SHIPPED | OUTPATIENT
Start: 2020-01-22 | End: 2020-01-29 | Stop reason: SDUPTHER

## 2020-01-29 DIAGNOSIS — J43.9 PULMONARY EMPHYSEMA, UNSPECIFIED EMPHYSEMA TYPE: ICD-10-CM

## 2020-01-30 RX ORDER — ALBUTEROL SULFATE 90 UG/1
2 AEROSOL, METERED RESPIRATORY (INHALATION) EVERY 4 HOURS PRN
Qty: 18 G | Refills: 3 | Status: ON HOLD | OUTPATIENT
Start: 2020-01-30 | End: 2020-05-06 | Stop reason: HOSPADM

## 2020-02-11 RX ORDER — PREDNISONE 5 MG/1
5 TABLET ORAL 2 TIMES DAILY
Qty: 60 TABLET | Refills: 5 | Status: ON HOLD | OUTPATIENT
Start: 2020-02-11 | End: 2020-05-06 | Stop reason: HOSPADM

## 2020-02-14 DIAGNOSIS — Z92.241 STEROID-DEPENDENT CHRONIC OBSTRUCTIVE PULMONARY DISEASE: ICD-10-CM

## 2020-02-14 DIAGNOSIS — J43.9 PULMONARY EMPHYSEMA, UNSPECIFIED EMPHYSEMA TYPE: Primary | ICD-10-CM

## 2020-02-14 DIAGNOSIS — J44.9 STEROID-DEPENDENT CHRONIC OBSTRUCTIVE PULMONARY DISEASE: ICD-10-CM

## 2020-02-14 RX ORDER — TIOTROPIUM BROMIDE 18 UG/1
18 CAPSULE ORAL; RESPIRATORY (INHALATION) DAILY
Qty: 90 CAPSULE | Refills: 3 | Status: SHIPPED | OUTPATIENT
Start: 2020-02-14 | End: 2020-04-15 | Stop reason: ALTCHOICE

## 2020-02-14 RX ORDER — ALBUTEROL SULFATE 90 UG/1
2 AEROSOL, METERED RESPIRATORY (INHALATION) EVERY 4 HOURS PRN
Qty: 18 G | Refills: 11 | Status: SHIPPED | OUTPATIENT
Start: 2020-02-14 | End: 2020-11-25 | Stop reason: SDUPTHER

## 2020-04-15 ENCOUNTER — OFFICE VISIT (OUTPATIENT)
Dept: PULMONOLOGY | Facility: CLINIC | Age: 70
End: 2020-04-15
Payer: MEDICARE

## 2020-04-15 DIAGNOSIS — J43.9 PULMONARY EMPHYSEMA, UNSPECIFIED EMPHYSEMA TYPE: ICD-10-CM

## 2020-04-15 DIAGNOSIS — Z79.52 CURRENT CHRONIC USE OF SYSTEMIC STEROIDS: ICD-10-CM

## 2020-04-15 DIAGNOSIS — Z92.241 STEROID-DEPENDENT CHRONIC OBSTRUCTIVE PULMONARY DISEASE: Primary | ICD-10-CM

## 2020-04-15 DIAGNOSIS — J45.50 SEVERE PERSISTENT ASTHMA WITHOUT COMPLICATION: ICD-10-CM

## 2020-04-15 DIAGNOSIS — J44.9 STEROID-DEPENDENT CHRONIC OBSTRUCTIVE PULMONARY DISEASE: Primary | ICD-10-CM

## 2020-04-15 DIAGNOSIS — J96.01 ACUTE HYPOXEMIC RESPIRATORY FAILURE: ICD-10-CM

## 2020-04-15 PROCEDURE — 99443 PR PHYSICIAN TELEPHONE EVALUATION 21-30 MIN: ICD-10-PCS | Mod: 95,,, | Performed by: NURSE PRACTITIONER

## 2020-04-15 PROCEDURE — 99443 PR PHYSICIAN TELEPHONE EVALUATION 21-30 MIN: CPT | Mod: 95,,, | Performed by: NURSE PRACTITIONER

## 2020-04-15 RX ORDER — PREDNISONE 20 MG/1
20 TABLET ORAL
Qty: 12 TABLET | Refills: 2 | Status: ON HOLD | OUTPATIENT
Start: 2020-04-15 | End: 2020-05-06 | Stop reason: HOSPADM

## 2020-04-15 RX ORDER — LOSARTAN POTASSIUM 50 MG/1
TABLET ORAL
Status: ON HOLD | COMMUNITY
Start: 2020-02-09 | End: 2020-05-06 | Stop reason: HOSPADM

## 2020-04-15 RX ORDER — ALBUTEROL SULFATE 90 UG/1
2 AEROSOL, METERED RESPIRATORY (INHALATION) EVERY 4 HOURS PRN
Qty: 18 G | Refills: 11 | Status: ON HOLD | OUTPATIENT
Start: 2020-04-15 | End: 2020-05-06 | Stop reason: HOSPADM

## 2020-04-15 RX ORDER — TIZANIDINE 4 MG/1
TABLET ORAL
Status: ON HOLD | COMMUNITY
Start: 2020-02-09 | End: 2020-05-06 | Stop reason: HOSPADM

## 2020-04-15 RX ORDER — ATORVASTATIN CALCIUM 40 MG/1
TABLET, FILM COATED ORAL
Status: ON HOLD | COMMUNITY
Start: 2020-02-09 | End: 2020-05-06 | Stop reason: HOSPADM

## 2020-04-15 RX ORDER — IPRATROPIUM BROMIDE AND ALBUTEROL SULFATE 2.5; .5 MG/3ML; MG/3ML
3 SOLUTION RESPIRATORY (INHALATION) EVERY 6 HOURS PRN
Qty: 360 ML | Refills: 5 | Status: SHIPPED | OUTPATIENT
Start: 2020-04-15 | End: 2021-02-09 | Stop reason: SDUPTHER

## 2020-04-15 NOTE — PROGRESS NOTES
4/15/2020    Brad Nowak  Office Note    Chief Complaint   Patient presents with    Shortness of Breath       HPI:   4/15/2020- SOB- unchanged, daily, worse with exertion, limiting daily activities and slows him down, decreased Prednisone 20 mg once tp twice a week. Has supplemental oxygen at 5L at night.     December 20, 2019- received oxygen concentrator, states does not improve SOB during day, SOB- stable, daily problem, worse with exertion, improves with nebulized Albuterol. Increased Prednisone himself to 10 mg in am and 40 mg nightly every night for last 3 weeks.      September 20, 2019- SOB worsening, using albuterol nebulizer 1-2 x daily every day. Currently on Prednisone 10 mg daily, with 20mg taper when needed. States having to breath out of mouth to exhale. Stopped Dupixent due to lack of benefit.     June 20, 2019- Dupixent injections started Feb 2019, pt states no benefit, Breathing has worsened. Exercise tolerance has decreased. Currently taking 10 mg prednisone daily, taken prednisone 20 mg taper 5 x in 3 months.    03/20/2019-Breathing worsened in past month, on dupixent since January, Currently on Trelegy 1 puff daily and prednisone 10 mg daily every day.   No fever, no body aches, no pain, no wheeze, not able to do activities with out shortness of breath. Occasional non productive cough.     12/20/18- Breathing worsened, SOB with any exertion, not taking advair due to cost.  On long term prednisone therapy for uncontrolled severe persistent asthma. Eosinophil count artificially low due steroid use.    11/9/18- ER at [facility] for chest pain, chest tightness, elevated pulse rate of 130, Discharged  Dx: COPD exacerbation, treated with albuterol nebulizer. States Advair cost to high, $183 month, States recently retired.   Using Duo neb 2x daily, 2-3 albuterol inhaler daily.    9/20/18- pt presents in clinic alone, states taking prednisone 10 mg daily and 20 mg daily twice a week when SOB, has  taken 40 mg a day once last week. Uses rescue inhaler most days after climbing stairs at work. Has trouble sleeping. Discussed fasenra and pharmacy unable to contact patient. States he does not carry phone at work during day, does not answer numbers he does not know, and has no voicemail set up at moment. States using Advair Diskus daily. Cough every few days, non productive. Worse at night.      July 5, 2018- on prednisone 5/d with 20/d up to 3 weekly.  Was in hospital December.  Breathing worse, saw asbestos  and advised 2nd opinion, to see Dr Rodriguez soon.  No fasenra as pharmacy unable to contact pt. Pt ues prednisone 5/d and not using trelegy- has one puff left on 14 day sample given in April and relates trelegy not seems to help.  April 5 - increase prednisone from 5/d to 20/d since last yr - breathing still poor.  Rest good - but not better on prednisone 20/d.  No big improvement on incruse. Sinus better on flonase.     12/28/2017 pt very active, bikes to work with tool boxes with no problem. Had had hospital stays every 6 months til started prednisone continiously  5 yrs ago.  Pt recently flared with recent NSR stay. Pt had pft with fev1 50% at 1.53 liter 12/15/2017.  Very mahmood still.  On breo and singulair.  Use spiriva in past.     From consult last month:Plan:   Pt has eosinophillic asthma and copd.  Pt should respond to singulair.  Higher dose steroid action plan needed.  He would likely do very well with il5 rx.     outpt soon on prednisone 60/d x 7 with taper, going to breo 200, and singulair would be good with current rx.  Would not strop prednisone.  outpt f/u recommended. I do no see asbestos complications.  History of Present Illness:  Cc is sob x 3 days.   Pt worked refinery as .  Stopped smoking yrs ago. FH + asthma with no prior h/o asthma.  Has had cough and wheezes and sob going back prior to [place].  Pt noted needed freq steroids so was kept on prednisone 5 mg daily with  need to increase dose ever 2-3 months because of increase sob.  Pt uses breo 100 and xopenex works better than ventolin.  Pt has chr nasal drainage but no bad infections.  abx not used.  He denies spiriva helped in past and denies using daliresp.    Pt rides bike to work - bike weighs 300 lbs with tools for trade.  Pt very active and still works - lives with daughter.    Reviewed note    HPI:      2018 - Pt with known COPD, asthma (sees Dr Eddy) here for second opinion regarding asbestos lung disease.  Started working on [employer] in .  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  He started working as a  in  until present time and reports that he has had asbestos exposure.  He denies working directly with asbestos but did work in the vicinity.  He reports that Dr Eddy thinks he has asbestos issues.  Has had PFT but doesn't remember when.  He has chronic SOB, SHOEMAKER (being addressed by Dr Eddy)  He only uses prn inhalers.  Has some chronic cough and wheezing.  Feels that SOB is worse but he rides bike around at work (weights about 200#).        The chief compliant  problem is stable    PFSH:  Past Medical History:   Diagnosis Date    Bronchitis     COPD (chronic obstructive pulmonary disease)          Past Surgical History:   Procedure Laterality Date    HERNIA REPAIR      WRIST SURGERY       Social History     Tobacco Use    Smoking status: Former Smoker     Packs/day: 2.00     Years: 40.00     Pack years: 80.00     Last attempt to quit: 3/24/2009     Years since quittin.0    Smokeless tobacco: Never Used   Substance Use Topics    Alcohol use: Yes     Comment: 6-12 beers/night; some days none.    Drug use: No     Family History   Problem Relation Age of Onset    Cancer Mother     Asthma Mother     Heart disease Father     COPD Father     Cancer  Father     Cancer Brother         colon cancer    No Known Problems Brother     No Known Problems Brother      Review of patient's allergies indicates:  No Known Allergies  I have reviewed past medical, family, and social history. I have reviewed previous nurse notes.    Performance Status:The patient's activity level is household activities.        Review of Systems   Constitutional: Negative for activity change, appetite change, chills, diaphoresis, fatigue, fever and unexpected weight change.   HENT: Negative for dental problem, postnasal drip, rhinorrhea, sinus pain, sneezing, sore throat, trouble swallowing and voice change.  Positive for sinus pressure,   Respiratory: Negative for apnea, cough, chest tightness, and stridor.  Positive for shortness of breath, wheezing   Cardiovascular: Negative for chest pain, palpitations and leg swelling.   Musculoskeletal: Negative for gait problem, myalgias and neck pain.   Skin: Negative for color change and pallor.    Allergic/Immunologic: Negative for environmental allergies and food allergies.   Neurological: Negative for dizziness, speech difficulty, weakness, light-headedness, numbness and headaches.   Hematological: Negative for adenopathy. Does not bruise/bleed easily.   Psychiatric/Behavioral: Negative for dysphoric mood and sleep disturbance. The patient is not nervous/anxious.           Exam:limited exam done. There were no vitals taken for this visit.  Exam included Vitals as listed  Constitutional: He is oriented to person, place, and time. He appears well-developed. No distress.      Neurological:  alert and oriented to person, place, and time. not disoriented.   Psychiatric: normal mood and affect. behavior is normal. Judgment and thought content normal.       Radiographs (ct chest and cxr) reviewed: results reviewed by direct vision  CT Chest Without Contrast 12/20/2019   Emphysema.  Atherosclerotic calcification of the aorta, origins of the right and  "left subclavian arteries and coronary arteries.    CT Chest Without Contrast 07/09/2019  Viewed by direct vision, poor quality images 74 images total not a sufficient study.  The lungs are hyperexpanded with bullous emphysematous changes seen in the upper lobes.  The lower lobes demonstrate prominent interlobar septa.  There are few patchy" bud in tree "appearance seen in the left lower lobe.  There are no pulmonary nodules or suspicious masses.  A small bilateral pleural effusion is present which could be physiologic.    The heart is normal in size however demonstrates coronary artery calcifications.  The tracheobronchial airway is slightly dilated.  There is shotty lymph nodes noted of the mediastinum.  Note is made of calcifications of the thoracic aorta.     XR CHEST 1 VIEW03/20/2019   Negative chest x-ray     2D ECHO WITH COLOR FLOW DOPPLER 12/15/2017   3 - Normal left ventricular systolic function (EF 65-70%).     4 - Impaired LV relaxation, normal LAP (grade 1 diastolic dysfunction).       Labs reviewed       Lab Results   Component Value Date    WBC 13.40 (H) 03/20/2019    RBC 4.46 (L) 03/20/2019    HGB 13.5 (L) 03/20/2019    HCT 40.8 03/20/2019    MCV 92 03/20/2019    MCH 30.3 03/20/2019    MCHC 33.0 03/20/2019    RDW 13.6 03/20/2019     (H) 03/20/2019    MPV 8.1 (L) 03/20/2019    GRAN 11.0 (H) 03/20/2019    GRAN 82.3 (H) 03/20/2019    LYMPH 1.5 03/20/2019    LYMPH 11.6 (L) 03/20/2019    MONO 0.6 03/20/2019    MONO 4.5 03/20/2019    EOS 0.1 03/20/2019    BASO 0.10 03/20/2019    EOSINOPHIL 0.7 03/20/2019    BASOPHIL 0.9 03/20/2019     Results for MADELEINE EVANS (MRN [id number]) as of 12/20/2018 14:08   Ref. Range 12/11/2017 05:14   Eos # Latest Ref Range: 0.0 - 0.5 K/uL 0.5       PFT results reviewed  Pulmonary Functions Testing Results:  PFT results reviewed fev1 50 % at 1.53 with no bd response 12/15/2017      Spirometry bronchodilator, lung volume by gas dilution, diffusion capacity measured July 9, " 2019.  The FEV1 to FVC ratio was only 54%, this indicates airflow obstruction.  The FEV1 measured 59% predicted at 1.75 L.  The of 5% improvement in the FEV1   following bronchodilator was not statistically significant, 12% seem to for statistical significance.  Total lung capacity was normal.  Diffusion was low, uncorrected for anemia present, at 61%.     There is  severe airflow obstruction, no bronchodilator response, no restriction, and loss of diffusion measured.  Clinical correlation recommended.          6 min walk study was done December 20, 2018.  Baseline room air saturation is 94%.  With ambulation O2 sat did fall 90% but no lower.  Patient could walk 425 m.  Patient walked 82% of the reference  distance     I spent over 30 mins of time with the patient. Reviewed results of the recently ordered labs, tests and studies; made directives with regards to the results. Over half of this time was spent couseling and coordinating care.     I have explained all of the above in detail and the patient understands all of the current recommendation(s). I have answered all of their questions to the best of my ability and to their complete satisfaction.   The patient is to continue with the current management plan.      Plan:  Clinical impression is resonably certain and repeated evaluation prn +/- follow up will be needed as below.    Brad was seen today for shortness of breath.    Diagnoses and all orders for this visit:    Steroid-dependent chronic obstructive pulmonary disease  -     predniSONE (DELTASONE) 20 MG tablet; Take 1 tablet (20 mg total) by mouth every Mon, Wed, Fri. Take one pill a day for three days, repeat for shortness of breath  -     albuterol-ipratropium (DUO-NEB) 2.5 mg-0.5 mg/3 mL nebulizer solution; Take 3 mLs by nebulization every 6 (six) hours as needed for Wheezing or Shortness of Breath. Rescue  -     fluticasone-umeclidin-vilanter (TRELEGY ELLIPTA) 100-62.5-25 mcg DsDv; Inhale 1 puff into  the lungs once daily.  -     albuterol (VENTOLIN HFA) 90 mcg/actuation inhaler; Inhale 2 puffs into the lungs every 4 (four) hours as needed for Wheezing or Shortness of Breath. Rescue    Pulmonary emphysema, unspecified emphysema type  -     predniSONE (DELTASONE) 20 MG tablet; Take 1 tablet (20 mg total) by mouth every Mon, Wed, Fri. Take one pill a day for three days, repeat for shortness of breath  -     albuterol-ipratropium (DUO-NEB) 2.5 mg-0.5 mg/3 mL nebulizer solution; Take 3 mLs by nebulization every 6 (six) hours as needed for Wheezing or Shortness of Breath. Rescue  -     fluticasone-umeclidin-vilanter (TRELEGY ELLIPTA) 100-62.5-25 mcg DsDv; Inhale 1 puff into the lungs once daily.  -     albuterol (VENTOLIN HFA) 90 mcg/actuation inhaler; Inhale 2 puffs into the lungs every 4 (four) hours as needed for Wheezing or Shortness of Breath. Rescue    Severe persistent asthma without complication  -     predniSONE (DELTASONE) 20 MG tablet; Take 1 tablet (20 mg total) by mouth every Mon, Wed, Fri. Take one pill a day for three days, repeat for shortness of breath    Current chronic use of systemic steroids  -     predniSONE (DELTASONE) 20 MG tablet; Take 1 tablet (20 mg total) by mouth every Mon, Wed, Fri. Take one pill a day for three days, repeat for shortness of breath    Acute hypoxemic respiratory failure     - continue supplemental oxygen    Follow up in about 3 months (around 7/15/2020), or if symptoms worsen or fail to improve.    Discussed with patient above for education the following:      Patient Instructions   Continue current COPD medication regiment    Continue supplemental oxygen,      The patient location is: home  The chief complaint leading to consultation is: shortness of breath  Visit type: audio only pt does not have a smart device  Total time spent with patient: 30 minutes  Each patient to whom he or she provides medical services by telemedicine is:  (1) informed of the relationship  between the physician and patient and the respective role of any other health care provider with respect to management of the patient; and (2) notified that he or she may decline to receive medical services by telemedicine and may withdraw from such care at any time.    Notes:

## 2020-05-04 PROBLEM — J96.01 ACUTE RESPIRATORY FAILURE WITH HYPOXIA: Status: ACTIVE | Noted: 2020-05-04

## 2020-05-04 PROBLEM — E87.1 HYPONATREMIA: Status: ACTIVE | Noted: 2020-05-04

## 2020-05-04 PROBLEM — E83.42 HYPOMAGNESEMIA: Status: ACTIVE | Noted: 2020-05-04

## 2020-05-04 PROBLEM — E87.20 LACTIC ACIDOSIS: Status: ACTIVE | Noted: 2020-05-04

## 2020-05-04 PROBLEM — A41.9 SEPSIS: Status: ACTIVE | Noted: 2020-05-04

## 2020-05-04 PROBLEM — J18.9 COMMUNITY ACQUIRED PNEUMONIA: Status: ACTIVE | Noted: 2020-05-04

## 2020-05-04 PROBLEM — I10 HYPERTENSION: Status: ACTIVE | Noted: 2020-05-04

## 2020-05-04 PROBLEM — E78.5 HYPERLIPIDEMIA: Status: ACTIVE | Noted: 2020-05-04

## 2020-05-05 PROBLEM — E87.20 LACTIC ACIDOSIS: Status: RESOLVED | Noted: 2020-05-04 | Resolved: 2020-05-05

## 2020-05-05 PROBLEM — E83.42 HYPOMAGNESEMIA: Status: RESOLVED | Noted: 2020-05-04 | Resolved: 2020-05-05

## 2020-05-05 PROBLEM — J96.21 ACUTE ON CHRONIC RESPIRATORY FAILURE WITH HYPOXIA: Status: ACTIVE | Noted: 2020-05-04

## 2020-05-05 PROBLEM — E87.1 HYPONATREMIA: Status: RESOLVED | Noted: 2020-05-04 | Resolved: 2020-05-05

## 2020-05-06 PROBLEM — A41.9 SEPSIS: Status: RESOLVED | Noted: 2020-05-04 | Resolved: 2020-05-06

## 2020-05-06 PROBLEM — J96.21 ACUTE ON CHRONIC RESPIRATORY FAILURE WITH HYPOXIA: Status: RESOLVED | Noted: 2020-05-04 | Resolved: 2020-05-06

## 2020-05-22 DIAGNOSIS — J44.9 STEROID-DEPENDENT CHRONIC OBSTRUCTIVE PULMONARY DISEASE: ICD-10-CM

## 2020-05-22 DIAGNOSIS — J43.9 PULMONARY EMPHYSEMA, UNSPECIFIED EMPHYSEMA TYPE: ICD-10-CM

## 2020-05-22 DIAGNOSIS — Z92.241 STEROID-DEPENDENT CHRONIC OBSTRUCTIVE PULMONARY DISEASE: ICD-10-CM

## 2020-05-22 RX ORDER — ALBUTEROL SULFATE 90 UG/1
2 AEROSOL, METERED RESPIRATORY (INHALATION) EVERY 4 HOURS PRN
Qty: 18 G | Refills: 11 | Status: CANCELLED | OUTPATIENT
Start: 2020-05-22

## 2020-05-28 DIAGNOSIS — J43.9 PULMONARY EMPHYSEMA, UNSPECIFIED EMPHYSEMA TYPE: ICD-10-CM

## 2020-05-28 DIAGNOSIS — Z92.241 STEROID-DEPENDENT CHRONIC OBSTRUCTIVE PULMONARY DISEASE: ICD-10-CM

## 2020-05-28 DIAGNOSIS — J44.9 STEROID-DEPENDENT CHRONIC OBSTRUCTIVE PULMONARY DISEASE: ICD-10-CM

## 2020-05-28 DIAGNOSIS — Z79.52 CURRENT CHRONIC USE OF SYSTEMIC STEROIDS: ICD-10-CM

## 2020-05-28 DIAGNOSIS — J45.50 SEVERE PERSISTENT ASTHMA WITHOUT COMPLICATION: ICD-10-CM

## 2020-05-28 RX ORDER — PREDNISONE 20 MG/1
20 TABLET ORAL
Qty: 12 TABLET | Refills: 2 | Status: SHIPPED | OUTPATIENT
Start: 2020-05-29 | End: 2020-08-27 | Stop reason: SDUPTHER

## 2020-07-15 ENCOUNTER — OFFICE VISIT (OUTPATIENT)
Dept: PULMONOLOGY | Facility: CLINIC | Age: 70
End: 2020-07-15
Payer: MEDICARE

## 2020-07-15 ENCOUNTER — TELEPHONE (OUTPATIENT)
Dept: PULMONOLOGY | Facility: CLINIC | Age: 70
End: 2020-07-15

## 2020-07-15 VITALS
OXYGEN SATURATION: 94 % | TEMPERATURE: 99 F | WEIGHT: 191.5 LBS | BODY MASS INDEX: 29.99 KG/M2 | DIASTOLIC BLOOD PRESSURE: 72 MMHG | HEART RATE: 85 BPM | SYSTOLIC BLOOD PRESSURE: 154 MMHG

## 2020-07-15 DIAGNOSIS — G47.30 SLEEP APNEA, UNSPECIFIED TYPE: Primary | ICD-10-CM

## 2020-07-15 DIAGNOSIS — J96.11 CHRONIC RESPIRATORY FAILURE WITH HYPOXIA: ICD-10-CM

## 2020-07-15 DIAGNOSIS — Z01.818 OTHER SPECIFIED PRE-OPERATIVE EXAMINATION: Primary | ICD-10-CM

## 2020-07-15 DIAGNOSIS — J44.9 CHRONIC OBSTRUCTIVE PULMONARY DISEASE, UNSPECIFIED COPD TYPE: ICD-10-CM

## 2020-07-15 PROBLEM — J96.01 ACUTE HYPOXEMIC RESPIRATORY FAILURE: Status: RESOLVED | Noted: 2017-12-11 | Resolved: 2020-07-15

## 2020-07-15 PROCEDURE — 3008F PR BODY MASS INDEX (BMI) DOCUMENTED: ICD-10-PCS | Mod: CPTII,S$GLB,, | Performed by: NURSE PRACTITIONER

## 2020-07-15 PROCEDURE — 99214 PR OFFICE/OUTPT VISIT, EST, LEVL IV, 30-39 MIN: ICD-10-PCS | Mod: S$GLB,,, | Performed by: NURSE PRACTITIONER

## 2020-07-15 PROCEDURE — 1159F MED LIST DOCD IN RCRD: CPT | Mod: S$GLB,,, | Performed by: NURSE PRACTITIONER

## 2020-07-15 PROCEDURE — 99999 PR PBB SHADOW E&M-EST. PATIENT-LVL IV: CPT | Mod: PBBFAC,,, | Performed by: NURSE PRACTITIONER

## 2020-07-15 PROCEDURE — 99214 OFFICE O/P EST MOD 30 MIN: CPT | Mod: S$GLB,,, | Performed by: NURSE PRACTITIONER

## 2020-07-15 PROCEDURE — 1159F PR MEDICATION LIST DOCUMENTED IN MEDICAL RECORD: ICD-10-PCS | Mod: S$GLB,,, | Performed by: NURSE PRACTITIONER

## 2020-07-15 PROCEDURE — 1126F PR PAIN SEVERITY QUANTIFIED, NO PAIN PRESENT: ICD-10-PCS | Mod: S$GLB,,, | Performed by: NURSE PRACTITIONER

## 2020-07-15 PROCEDURE — 1126F AMNT PAIN NOTED NONE PRSNT: CPT | Mod: S$GLB,,, | Performed by: NURSE PRACTITIONER

## 2020-07-15 PROCEDURE — 3008F BODY MASS INDEX DOCD: CPT | Mod: CPTII,S$GLB,, | Performed by: NURSE PRACTITIONER

## 2020-07-15 PROCEDURE — 1101F PT FALLS ASSESS-DOCD LE1/YR: CPT | Mod: CPTII,S$GLB,, | Performed by: NURSE PRACTITIONER

## 2020-07-15 PROCEDURE — 1101F PR PT FALLS ASSESS DOC 0-1 FALLS W/OUT INJ PAST YR: ICD-10-PCS | Mod: CPTII,S$GLB,, | Performed by: NURSE PRACTITIONER

## 2020-07-15 PROCEDURE — 99999 PR PBB SHADOW E&M-EST. PATIENT-LVL IV: ICD-10-PCS | Mod: PBBFAC,,, | Performed by: NURSE PRACTITIONER

## 2020-07-15 RX ORDER — BUDESONIDE 0.5 MG/2ML
0.5 INHALANT ORAL 2 TIMES DAILY
Qty: 120 ML | Refills: 5 | Status: SHIPPED | OUTPATIENT
Start: 2020-07-15 | End: 2021-02-09 | Stop reason: SDUPTHER

## 2020-07-15 RX ORDER — KETOCONAZOLE 20 MG/ML
SHAMPOO, SUSPENSION TOPICAL
COMMUNITY
Start: 2020-06-26 | End: 2023-05-02

## 2020-07-15 NOTE — PROGRESS NOTES
7/15/2020    Brad Nowak  Office Note    Chief Complaint   Patient presents with    Follow-up     3 months    Shortness of Breath       HPI:   7/15/2020- shortness of breath worsening slowly with time, states harder to breath following pneumonia May 2020 tx in [facility]. SOB severe and hinders his activity level, wearing oxygen only at night 5L. States it does not help much.   Complaint fatigue, daytime drowsiness, sleeping time varies, feeling drowsy in mornings, sleeps alone.  Cough- recurrent problem, occasionally productive light yellow mucous cream colored, no nocturnal arousals, no chest tightness or wheeze.  I05 therapy did not improve breathing, not currently on daily prednisone, using nebulizer as needed 2-3x daily. Trelegy    4/15/2020- SOB- unchanged, daily, worse with exertion, limiting daily activities and slows him down, decreased Prednisone 20 mg once tp twice a week. Has supplemental oxygen at 5L at night.     December 20, 2019- received oxygen concentrator, states does not improve SOB during day, SOB- stable, daily problem, worse with exertion, improves with nebulized Albuterol. Increased Prednisone himself to 10 mg in am and 40 mg nightly every night for last 3 weeks.      September 20, 2019- SOB worsening, using albuterol nebulizer 1-2 x daily every day. Currently on Prednisone 10 mg daily, with 20mg taper when needed. States having to breath out of mouth to exhale. Stopped Dupixent due to lack of benefit.     June 20, 2019- Dupixent injections started Feb 2019, pt states no benefit, Breathing has worsened. Exercise tolerance has decreased. Currently taking 10 mg prednisone daily, taken prednisone 20 mg taper 5 x in 3 months.    03/20/2019-Breathing worsened in past month, on dupixent since January, Currently on Trelegy 1 puff daily and prednisone 10 mg daily every day.   No fever, no body aches, no pain, no wheeze, not able to do activities with out shortness of breath. Occasional  non productive cough.     12/20/18- Breathing worsened, SOB with any exertion, not taking advair due to cost.  On long term prednisone therapy for uncontrolled severe persistent asthma. Eosinophil count artificially low due steroid use.    11/9/18- ER at [place] for chest pain, chest tightness, elevated pulse rate of 130, Discharged  Dx: COPD exacerbation, treated with albuterol nebulizer. States Advair cost to high, $183 month, States recently retired.   Using Duo neb 2x daily, 2-3 albuterol inhaler daily.    9/20/18- pt presents in clinic alone, states taking prednisone 10 mg daily and 20 mg daily twice a week when SOB, has taken 40 mg a day once last week. Uses rescue inhaler most days after climbing stairs at work. Has trouble sleeping. Discussed fasenra and pharmacy unable to contact patient. States he does not carry phone at work during day, does not answer numbers he does not know, and has no voicemail set up at moment. States using Advair Diskus daily. Cough every few days, non productive. Worse at night.      July 5, 2018- on prednisone 5/d with 20/d up to 3 weekly.  Was in hospital December.  Breathing worse, saw asbestos  and advised 2nd opinion, to see Dr Rodriguez soon.  No fasenra as pharmacy unable to contact pt. Pt ues prednisone 5/d and not using trelegy- has one puff left on 14 day sample given in April and relates trelegy not seems to help.  April 5 - increase prednisone from 5/d to 20/d since last yr - breathing still poor.  Rest good - but not better on prednisone 20/d.  No big improvement on incruse. Sinus better on flonase.     12/28/2017 pt very active, bikes to work with tool boxes with no problem. Had had hospital stays every 6 months til started prednisone continiously  5 yrs ago.  Pt recently flared with recent NSR stay. Pt had pft with fev1 50% at 1.53 liter 12/15/2017.  Very mahmood still.  On breo and singulair.  Use spiriva in past.     From consult last month:Plan:   Pt has  eosinophillic asthma and copd.  Pt should respond to singulair.  Higher dose steroid action plan needed.  He would likely do very well with il5 rx.     outpt soon on prednisone 60/d x 7 with taper, going to breo 200, and singulair would be good with current rx.  Would not strop prednisone.  outpt f/u recommended. I do no see asbestos complications.  History of Present Illness:  Cc is sob x 3 days.   Pt worked refinery as .  Stopped smoking yrs ago. FH + asthma with no prior h/o asthma.  Has had cough and wheezes and sob going back prior to [place].  Pt noted needed freq steroids so was kept on prednisone 5 mg daily with need to increase dose ever 2-3 months because of increase sob.  Pt uses breo 100 and xopenex works better than ventolin.  Pt has chr nasal drainage but no bad infections.  abx not used.  He denies spiriva helped in past and denies using daliresp.    Pt rides bike to work - bike weighs 300 lbs with tools for trade.  Pt very active and still works - lives with daughter.    Reviewed note    HPI:      8/21/2018 - Pt with known COPD, asthma (sees Dr Eddy) here for second opinion regarding asbestos lung disease.  Started working on [employer] in 1968.  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  He started working as a  in 1987 until present time and reports that he has had asbestos exposure.  He denies working directly with asbestos but did work in the vicinity.  He reports that Dr Eddy thinks he has asbestos issues.  Has had PFT but doesn't remember when.  He has chronic SOB, SHOEMAKER (being addressed by Dr Eddy)  He only uses prn inhalers.  Has some chronic cough and wheezing.  Feels that SOB is worse but he rides bike around at work (weights about 200#).        The chief compliant  problem is worsening    PFSH:  Past Medical History:   Diagnosis Date     COPD (chronic obstructive pulmonary disease)     Hyperlipidemia     Hypertension     Obstructive sleep apnea          Past Surgical History:   Procedure Laterality Date    INGUINAL HERNIA REPAIR Left     SHOULDER ARTHROSCOPY Right     WRIST SURGERY Left      Social History     Tobacco Use    Smoking status: Former Smoker     Packs/day: 2.00     Years: 40.00     Pack years: 80.00     Quit date: 3/24/2009     Years since quittin.3    Smokeless tobacco: Never Used   Substance Use Topics    Alcohol use: Yes     Comment: 6-12 beers/night; some days none.    Drug use: No     Family History   Problem Relation Age of Onset    Cancer Mother     Asthma Mother     Heart disease Father     COPD Father     Cancer Father     Cancer Brother         colon cancer    No Known Problems Brother     No Known Problems Brother      Review of patient's allergies indicates:  No Known Allergies  I have reviewed past medical, family, and social history. I have reviewed previous nurse notes.    Performance Status:The patient's activity level is household activities.        Review of Systems   Constitutional: Negative for activity change, appetite change, chills, diaphoresis, fatigue, fever and unexpected weight change.   HENT: Negative for dental problem, postnasal drip, rhinorrhea, sinus pain, sneezing, sore throat, trouble swallowing and voice change.  Positive for sinus pressure,   Respiratory: Negative for apnea, chest tightness,  wheezing and stridor.  Positive for shortness of breath, cough  Cardiovascular: Negative for chest pain, palpitations and leg swelling.   Musculoskeletal: Negative for gait problem, myalgias and neck pain. Positive for myalgias  Skin: Negative for color change and pallor.   Allergic/Immunologic: Negative for environmental allergies and food allergies.   Neurological: Negative for dizziness, speech difficulty, weakness, light-headedness, numbness and headaches.   Hematological: Negative for  adenopathy. Does not bruise/bleed easily.   Psychiatric/Behavioral: Negative for dysphoric mood. The patient is not nervous/anxious.  Positive for sleep disturbance       Exam:Comprehensive exam done. BP (!) 154/72 (BP Location: Left arm, Patient Position: Sitting)   Pulse 85   Temp 98.7 °F (37.1 °C)   Wt 86.8 kg (191 lb 7.5 oz)   SpO2 (!) 94% Comment: on room air at rest  BMI 29.99 kg/m²   Exam included Vitals as listed  Constitutional: He is oriented to person, place, and time. He appears well-developed. No distress.   Nose: Nose normal.   Mouth/Throat: Uvula is midline, oropharynx is clear and moist and mucous membranes are normal. No dental caries. No oropharyngeal exudate, posterior oropharyngeal edema, posterior oropharyngeal erythema or tonsillar abscesses. Mallapatti (M) score II  Eyes: Pupils are equal, round, and reactive to light.   Neck: No JVD present. No thyromegaly present.   Cardiovascular: Normal rate, regular rhythm and normal heart sounds. Exam reveals no gallop and no friction rub.   No murmur heard.  Pulmonary/Chest: Effort normal and breath sounds diminished bilaterally, Clear. No accessory muscle usage or stridor. No apnea and no tachypnea. No respiratory distress, wheezes, rhonchi, rales, or tenderness.   Abdominal: Soft. Distended, He exhibits no mass. There is no tenderness. No hepatosplenomegaly, hernias and normoactive bowel sounds  Musculoskeletal: Normal range of motion. exhibits no edema.   Lymphadenopathy:     He has no cervical adenopathy.   Neurological:  alert and oriented to person, place, and time. not disoriented.   Skin: Skin is warm and dry. Capillary refill takes less 2 sec. No cyanosis or erythema. No pallor. Nails show clubbing.   Psychiatric: normal mood and affect. behavior is normal. Judgment and thought content normal.         Radiographs (ct chest and cxr) reviewed: results reviewed by direct vision  CTA Chest Non-Coronary 05/04/2020   Limited CT pulmonary  "angiogram without evidence for definite filling defects within the adequately opacified portions of the pulmonary arteries.     Airspace consolidation in the right lung base and to a lesser extent in the left lung base may represent areas of pneumonia.     Thickening of the wall of the mid to distal esophagus.  This could be further assessed with upper endoscopy or an esophagram to evaluate the clinical significance of this finding.     Mildly prominent lymph nodes in the subcarinal region.     Diffuse fatty change throughout the liver.     Emphysematous changes in the lungs bilaterally.      X-Ray Chest AP Portable 05/04/2020   The heart size is within normal limits.  There are calcifications in the aortic arch.     There are linear foci of atelectasis or parenchymal scarring in the lung bases bilaterally.     There are no pleural effusions.  The osseous structures are intact.        CT Chest Without Contrast 12/20/2019   Emphysema.  Atherosclerotic calcification of the aorta, origins of the right and left subclavian arteries and coronary arteries.    CT Chest Without Contrast 07/09/2019  Viewed by direct vision, poor quality images 74 images total not a sufficient study.  The lungs are hyperexpanded with bullous emphysematous changes seen in the upper lobes.  The lower lobes demonstrate prominent interlobar septa.  There are few patchy" bud in tree "appearance seen in the left lower lobe.  There are no pulmonary nodules or suspicious masses.  A small bilateral pleural effusion is present which could be physiologic.    The heart is normal in size however demonstrates coronary artery calcifications.  The tracheobronchial airway is slightly dilated.  There is shotty lymph nodes noted of the mediastinum.  Note is made of calcifications of the thoracic aorta.     XR CHEST 1 VIEW03/20/2019   Negative chest x-ray     2D ECHO WITH COLOR FLOW DOPPLER 12/15/2017   3 - Normal left ventricular systolic function (EF 65-70%). "     4 - Impaired LV relaxation, normal LAP (grade 1 diastolic dysfunction).       Labs reviewed       Lab Results   Component Value Date    WBC 7.50 05/06/2020    RBC 3.83 (L) 05/06/2020    HGB 12.0 (L) 05/06/2020    HCT 35.0 (L) 05/06/2020    MCV 91 05/06/2020    MCH 31.3 (H) 05/06/2020    MCHC 34.3 05/06/2020    RDW 14.4 05/06/2020     05/06/2020    MPV 7.9 (L) 05/06/2020    GRAN 6.0 05/06/2020    GRAN 79.3 (H) 05/06/2020    LYMPH 0.7 (L) 05/06/2020    LYMPH 9.6 (L) 05/06/2020    MONO 0.6 05/06/2020    MONO 8.2 05/06/2020    EOS 0.2 05/06/2020    BASO 0.10 05/06/2020    EOSINOPHIL 2.1 05/06/2020    BASOPHIL 0.8 05/06/2020     Results for MADELEINE EVANS (MRN [id number]) as of 12/20/2018 14:08   Ref. Range 12/11/2017 05:14   Eos # Latest Ref Range: 0.0 - 0.5 K/uL 0.5       PFT results reviewed  Pulmonary Functions Testing Results:  PFT results reviewed fev1 50 % at 1.53 with no bd response 12/15/2017      Spirometry bronchodilator, lung volume by gas dilution, diffusion capacity measured July 9, 2019.  The FEV1 to FVC ratio was only 54%, this indicates airflow obstruction.  The FEV1 measured 59% predicted at 1.75 L.  The of 5% improvement in the FEV1   following bronchodilator was not statistically significant, 12% seem to for statistical significance.  Total lung capacity was normal.  Diffusion was low, uncorrected for anemia present, at 61%.     There is  severe airflow obstruction, no bronchodilator response, no restriction, and loss of diffusion measured.  Clinical correlation recommended.        6 min walk study was done December 20, 2018.  Baseline room air saturation is 94%.  With ambulation O2 sat did fall 90% but no lower.  Patient could walk 425 m.  Patient walked 82% of the reference  distance     I spent over 30 mins of time with the patient. Reviewed results of the recently ordered labs, tests and studies; made directives with regards to the results. Over half of this time was spent couseling and  coordinating care.     I have explained all of the above in detail and the patient understands all of the current recommendation(s). I have answered all of their questions to the best of my ability and to their complete satisfaction.   The patient is to continue with the current management plan.    EPWORTH SLEEPINESS SCALE SCORE 16 on 7/15/2020    Plan:  Clinical impression is resonably certain and repeated evaluation prn +/- follow up will be needed as below.    Brad was seen today for follow-up and shortness of breath.    Diagnoses and all orders for this visit:    Sleep apnea, unspecified type  -     Polysomnogram (CPAP will be added if patient meets diagnostic criteria.); Future    Chronic obstructive pulmonary disease, unspecified COPD type  -     budesonide (PULMICORT) 0.5 mg/2 mL nebulizer solution; Take 2 mLs (0.5 mg total) by nebulization 2 (two) times daily. Controller    Chronic respiratory failure with hypoxia  -     budesonide (PULMICORT) 0.5 mg/2 mL nebulizer solution; Take 2 mLs (0.5 mg total) by nebulization 2 (two) times daily. Controller     - continue supplemental oxygen    Follow up in about 3 months (around 10/15/2020), or if symptoms worsen or fail to improve.    Discussed with patient above for education the following:

## 2020-07-15 NOTE — PATIENT INSTRUCTIONS
Continue Trelegy daily  Adding budesonide to Duo neb for nebulizer treatments. Use budesonide every 12 hours and duo neb every 6 hours    Sleep study to see if you have sleep apnea, goal is non invasive ventilator    Continue supplemental oxygen day and night

## 2020-07-15 NOTE — TELEPHONE ENCOUNTER
----- Message from Hilda Solis sent at 7/15/2020 10:46 AM CDT -----  Regarding: COVID ORDERS NEEDED  Contact: [phone]  VALENTIN Guevara and Staff,    It has been decided that each in-lab Sleep Study Order should be accompanied with a COVID Screening Order.   I have an in-lab order for a PSG for this PT.  Please add the screening order to Epic.  Use the [id number]/Covid Screening & DX code: Z01.818 to order the Covid Screen.  Home Sleep Studies do not require the Covid Screen order, only in-lab Sleep Studies, such as PSG; CPAP & SPLIT nights.      Thank you,Nadia

## 2020-08-01 ENCOUNTER — LAB VISIT (OUTPATIENT)
Dept: PRIMARY CARE CLINIC | Facility: CLINIC | Age: 70
End: 2020-08-01
Payer: MEDICARE

## 2020-08-01 DIAGNOSIS — Z01.818 OTHER SPECIFIED PRE-OPERATIVE EXAMINATION: ICD-10-CM

## 2020-08-01 PROCEDURE — U0003 INFECTIOUS AGENT DETECTION BY NUCLEIC ACID (DNA OR RNA); SEVERE ACUTE RESPIRATORY SYNDROME CORONAVIRUS 2 (SARS-COV-2) (CORONAVIRUS DISEASE [COVID-19]), AMPLIFIED PROBE TECHNIQUE, MAKING USE OF HIGH THROUGHPUT TECHNOLOGIES AS DESCRIBED BY CMS-2020-01-R: HCPCS

## 2020-08-02 LAB — SARS-COV-2 RNA RESP QL NAA+PROBE: NOT DETECTED

## 2020-08-04 ENCOUNTER — PROCEDURE VISIT (OUTPATIENT)
Dept: SLEEP MEDICINE | Facility: HOSPITAL | Age: 70
End: 2020-08-04
Attending: NURSE PRACTITIONER
Payer: MEDICARE

## 2020-08-04 DIAGNOSIS — G47.30 SLEEP APNEA, UNSPECIFIED TYPE: ICD-10-CM

## 2020-08-04 PROCEDURE — 95810 POLYSOM 6/> YRS 4/> PARAM: CPT

## 2020-08-13 ENCOUNTER — TELEPHONE (OUTPATIENT)
Dept: PULMONOLOGY | Facility: CLINIC | Age: 70
End: 2020-08-13

## 2020-08-13 DIAGNOSIS — Z01.818 OTHER SPECIFIED PRE-OPERATIVE EXAMINATION: ICD-10-CM

## 2020-08-13 DIAGNOSIS — G47.33 OBSTRUCTIVE SLEEP APNEA SYNDROME: Primary | ICD-10-CM

## 2020-08-13 NOTE — TELEPHONE ENCOUNTER
----- Message from Hilda Solis sent at 8/13/2020  9:02 AM CDT -----  Regarding: COVID ORDERS NEEDED  Contact: [phone]  VALENTIN Guevara and Staff,    It has been decided that each in-lab Sleep Study Order should be accompanied with a COVID Screening Order.   I have an in-lab order for a CPAP Titration for this PT.  Please add the screening order to Epic.  Use the [id number]/Covid Screening & DX code: Z01.818 to order the Covid Screen.  Home Sleep Studies do not require the Covid Screen order, only in-lab Sleep Studies, such as PSG; CPAP & SPLIT nights.      Thank you,Nadia

## 2020-08-27 ENCOUNTER — OFFICE VISIT (OUTPATIENT)
Dept: PULMONOLOGY | Facility: CLINIC | Age: 70
End: 2020-08-27
Payer: MEDICARE

## 2020-08-27 VITALS
OXYGEN SATURATION: 97 % | DIASTOLIC BLOOD PRESSURE: 81 MMHG | HEART RATE: 90 BPM | SYSTOLIC BLOOD PRESSURE: 152 MMHG | BODY MASS INDEX: 29.78 KG/M2 | WEIGHT: 190.13 LBS

## 2020-08-27 DIAGNOSIS — J44.9 STEROID-DEPENDENT CHRONIC OBSTRUCTIVE PULMONARY DISEASE: ICD-10-CM

## 2020-08-27 DIAGNOSIS — J44.9 CHRONIC OBSTRUCTIVE PULMONARY DISEASE, UNSPECIFIED COPD TYPE: Primary | ICD-10-CM

## 2020-08-27 DIAGNOSIS — J43.9 PULMONARY EMPHYSEMA, UNSPECIFIED EMPHYSEMA TYPE: ICD-10-CM

## 2020-08-27 DIAGNOSIS — J84.9 INTERSTITIAL PULMONARY DISEASE, UNSPECIFIED: ICD-10-CM

## 2020-08-27 DIAGNOSIS — Z92.241 STEROID-DEPENDENT CHRONIC OBSTRUCTIVE PULMONARY DISEASE: ICD-10-CM

## 2020-08-27 DIAGNOSIS — G47.33 OBSTRUCTIVE SLEEP APNEA SYNDROME: ICD-10-CM

## 2020-08-27 DIAGNOSIS — Z79.52 CURRENT CHRONIC USE OF SYSTEMIC STEROIDS: ICD-10-CM

## 2020-08-27 PROCEDURE — 99214 OFFICE O/P EST MOD 30 MIN: CPT | Mod: S$GLB,,, | Performed by: NURSE PRACTITIONER

## 2020-08-27 PROCEDURE — 99999 PR PBB SHADOW E&M-EST. PATIENT-LVL IV: CPT | Mod: PBBFAC,,, | Performed by: NURSE PRACTITIONER

## 2020-08-27 PROCEDURE — 1159F MED LIST DOCD IN RCRD: CPT | Mod: S$GLB,,, | Performed by: NURSE PRACTITIONER

## 2020-08-27 PROCEDURE — 3008F PR BODY MASS INDEX (BMI) DOCUMENTED: ICD-10-PCS | Mod: CPTII,S$GLB,, | Performed by: NURSE PRACTITIONER

## 2020-08-27 PROCEDURE — 1159F PR MEDICATION LIST DOCUMENTED IN MEDICAL RECORD: ICD-10-PCS | Mod: S$GLB,,, | Performed by: NURSE PRACTITIONER

## 2020-08-27 PROCEDURE — 99999 PR PBB SHADOW E&M-EST. PATIENT-LVL IV: ICD-10-PCS | Mod: PBBFAC,,, | Performed by: NURSE PRACTITIONER

## 2020-08-27 PROCEDURE — 1126F AMNT PAIN NOTED NONE PRSNT: CPT | Mod: S$GLB,,, | Performed by: NURSE PRACTITIONER

## 2020-08-27 PROCEDURE — 3008F BODY MASS INDEX DOCD: CPT | Mod: CPTII,S$GLB,, | Performed by: NURSE PRACTITIONER

## 2020-08-27 PROCEDURE — 1101F PR PT FALLS ASSESS DOC 0-1 FALLS W/OUT INJ PAST YR: ICD-10-PCS | Mod: CPTII,S$GLB,, | Performed by: NURSE PRACTITIONER

## 2020-08-27 PROCEDURE — 1126F PR PAIN SEVERITY QUANTIFIED, NO PAIN PRESENT: ICD-10-PCS | Mod: S$GLB,,, | Performed by: NURSE PRACTITIONER

## 2020-08-27 PROCEDURE — 99214 PR OFFICE/OUTPT VISIT, EST, LEVL IV, 30-39 MIN: ICD-10-PCS | Mod: S$GLB,,, | Performed by: NURSE PRACTITIONER

## 2020-08-27 PROCEDURE — 1101F PT FALLS ASSESS-DOCD LE1/YR: CPT | Mod: CPTII,S$GLB,, | Performed by: NURSE PRACTITIONER

## 2020-08-27 RX ORDER — PREDNISONE 20 MG/1
TABLET ORAL
Qty: 12 TABLET | Refills: 2 | Status: SHIPPED | OUTPATIENT
Start: 2020-08-27 | End: 2020-11-09 | Stop reason: SDUPTHER

## 2020-08-27 RX ORDER — PREDNISONE 5 MG/1
5 TABLET ORAL DAILY
Qty: 30 TABLET | Refills: 2 | Status: SHIPPED | OUTPATIENT
Start: 2020-08-27 | End: 2020-11-09

## 2020-08-27 NOTE — PROGRESS NOTES
8/27/2020    Brad Nowak  Office Note    Chief Complaint   Patient presents with    Follow-up     sleep apnea    Shortness of Breath       HPI:     8/27/2020- had sleep study, positive results and waiting for Titration study.  No change in fatigue, SOB, daily, chronic complaint, using prednisone as needed on average 2-3 days weekly. Takes for Chest tightness.   Using nebulizer Budesonide 2 times daily with minimal improvement.  No cough, wearing supplemental oxygen at night.     7/15/2020- shortness of breath worsening slowly with time, states harder to breath following pneumonia May 2020 tx in [facility]. SOB severe and hinders his activity level, wearing oxygen only at night 5L. States it does not help much.   Complaint fatigue, daytime drowsiness, sleeping time varies, feeling drowsy in mornings, sleeps alone.  Cough- recurrent problem, occasionally productive light yellow mucous cream colored, no nocturnal arousals, no chest tightness or wheeze.  I05 therapy did not improve breathing, not currently on daily prednisone, using nebulizer as needed 2-3x daily. Trelegy    4/15/2020- SOB- unchanged, daily, worse with exertion, limiting daily activities and slows him down, decreased Prednisone 20 mg once tp twice a week. Has supplemental oxygen at 5L at night.     December 20, 2019- received oxygen concentrator, states does not improve SOB during day, SOB- stable, daily problem, worse with exertion, improves with nebulized Albuterol. Increased Prednisone himself to 10 mg in am and 40 mg nightly every night for last 3 weeks.      September 20, 2019- SOB worsening, using albuterol nebulizer 1-2 x daily every day. Currently on Prednisone 10 mg daily, with 20mg taper when needed. States having to breath out of mouth to exhale. Stopped Dupixent due to lack of benefit.     June 20, 2019- Dupixent injections started Feb 2019, pt states no benefit, Breathing has worsened. Exercise tolerance has decreased. Currently  taking 10 mg prednisone daily, taken prednisone 20 mg taper 5 x in 3 months.    03/20/2019-Breathing worsened in past month, on dupixent since January, Currently on Trelegy 1 puff daily and prednisone 10 mg daily every day.   No fever, no body aches, no pain, no wheeze, not able to do activities with out shortness of breath. Occasional non productive cough.     12/20/18- Breathing worsened, SOB with any exertion, not taking advair due to cost.  On long term prednisone therapy for uncontrolled severe persistent asthma. Eosinophil count artificially low due steroid use.    11/9/18- ER at [facility] for chest pain, chest tightness, elevated pulse rate of 130, Discharged  Dx: COPD exacerbation, treated with albuterol nebulizer. States Advair cost to high, $183 month, States recently retired.   Using Duo neb 2x daily, 2-3 albuterol inhaler daily.    9/20/18- pt presents in clinic alone, states taking prednisone 10 mg daily and 20 mg daily twice a week when SOB, has taken 40 mg a day once last week. Uses rescue inhaler most days after climbing stairs at work. Has trouble sleeping. Discussed fasenra and pharmacy unable to contact patient. States he does not carry phone at work during day, does not answer numbers he does not know, and has no voicemail set up at moment. States using Advair Diskus daily. Cough every few days, non productive. Worse at night.      July 5, 2018- on prednisone 5/d with 20/d up to 3 weekly.  Was in hospital December.  Breathing worse, saw asbestos  and advised 2nd opinion, to see Dr Rodriguez soon.  No fasenra as pharmacy unable to contact pt. Pt ues prednisone 5/d and not using trelegy- has one puff left on 14 day sample given in April and relates trelegy not seems to help.  April 5 - increase prednisone from 5/d to 20/d since last yr - breathing still poor.  Rest good - but not better on prednisone 20/d.  No big improvement on incruse. Sinus better on flonase.     12/28/2017 pt very  active, bikes to work with tool boxes with no problem. Had had hospital stays every 6 months til started prednisone continiously  5 yrs ago.  Pt recently flared with recent NSR stay. Pt had pft with fev1 50% at 1.53 liter 12/15/2017.  Very mahmood still.  On breo and singulair.  Use spiriva in past.     From consult last month:Plan:   Pt has eosinophillic asthma and copd.  Pt should respond to singulair.  Higher dose steroid action plan needed.  He would likely do very well with il5 rx.     outpt soon on prednisone 60/d x 7 with taper, going to breo 200, and singulair would be good with current rx.  Would not strop prednisone.  outpt f/u recommended. I do no see asbestos complications.  History of Present Illness:  Cc is sob x 3 days.   Pt worked [email or web address] as .  Stopped smoking yrs ago. FH + asthma with no prior h/o asthma.  Has had cough and wheezes and sob going back prior to [place].  Pt noted needed freq steroids so was kept on prednisone 5 mg daily with need to increase dose ever 2-3 months because of increase sob.  Pt uses breo 100 and xopenex works better than ventolin.  Pt has chr nasal drainage but no bad infections.  abx not used.  He denies spiriva helped in past and denies using daliresp.    Pt rides bike to work - bike weighs 300 lbs with tools for trade.  Pt very active and still works - lives with daughter.    Reviewed note    HPI:      8/21/2018 - Pt with known COPD, asthma (sees Dr Eddy) here for second opinion regarding asbestos lung disease.  Started working on [employer] in 1968.  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  He started working as a  in 1987 until present time and reports that he has had asbestos exposure.  He denies working directly with asbestos but did work in the vicinity.  He reports that Dr Eddy thinks he has asbestos issues.  Has  had PFT but doesn't remember when.  He has chronic SOB, SHOEMAKER (being addressed by Dr Eddy)  He only uses prn inhalers.  Has some chronic cough and wheezing.  Feels that SOB is worse but he rides bike around at work (weights about 200#).        The chief compliant  problem is varies with instablilty at time      PFSH:  Past Medical History:   Diagnosis Date    COPD (chronic obstructive pulmonary disease)     Hyperlipidemia     Hypertension     Obstructive sleep apnea          Past Surgical History:   Procedure Laterality Date    INGUINAL HERNIA REPAIR Left     SHOULDER ARTHROSCOPY Right     WRIST SURGERY Left      Social History     Tobacco Use    Smoking status: Former Smoker     Packs/day: 2.00     Years: 40.00     Pack years: 80.00     Quit date: 3/24/2009     Years since quittin.4    Smokeless tobacco: Never Used   Substance Use Topics    Alcohol use: Yes     Comment: 6-12 beers/night; some days none.    Drug use: No     Family History   Problem Relation Age of Onset    Cancer Mother     Asthma Mother     Heart disease Father     COPD Father     Cancer Father     Cancer Brother         colon cancer    No Known Problems Brother     No Known Problems Brother      Review of patient's allergies indicates:  No Known Allergies  I have reviewed past medical, family, and social history. I have reviewed previous nurse notes.    Performance Status:The patient's activity level is household activities.         Review of Systems   Constitutional: Negative for activity change, appetite change, chills, diaphoresis, fatigue, fever and unexpected weight change.   HENT: Negative for dental problem, postnasal drip, rhinorrhea, sinus pain, sneezing, sore throat, trouble swallowing and voice change.  Respiratory: Negative for apnea, chest tightness,  wheezing and stridor.  Positive for shortness of breath,   Cardiovascular: Negative for chest pain, palpitations and leg swelling.   Musculoskeletal: Negative for  gait problem, myalgias and neck pain. Positive for myalgias  Skin: Negative for color change and pallor.   Allergic/Immunologic: Negative for environmental allergies and food allergies.   Neurological: Negative for dizziness, speech difficulty, weakness, light-headedness, numbness and headaches.   Hematological: Negative for adenopathy. Does not bruise/bleed easily.   Psychiatric/Behavioral: Negative for dysphoric mood. The patient is not nervous/anxious.  Positive for sleep disturbance       Exam:Comprehensive exam done. BP (!) 152/81 (BP Location: Left arm, Patient Position: Sitting)   Pulse 90   Wt 86.3 kg (190 lb 2.4 oz)   SpO2 97% Comment: on room at rest  BMI 29.78 kg/m²   Exam included Vitals as listed  Constitutional: He is oriented to person, place, and time. He appears well-developed. No distress.   Nose: Nose normal.   Mouth/Throat: Uvula is midline, oropharynx is clear and moist and mucous membranes are normal. No dental caries. No oropharyngeal exudate, posterior oropharyngeal edema, posterior oropharyngeal erythema or tonsillar abscesses. Mallapatti (M) score II  Eyes: Pupils are equal, round, and reactive to light.   Neck: No JVD present. No thyromegaly present.   Cardiovascular: Normal rate, regular rhythm and normal heart sounds. Exam reveals no gallop and no friction rub.   No murmur heard.  Pulmonary/Chest: Effort normal and breath sounds diminished bilaterally, Clear. No accessory muscle usage or stridor. No apnea and no tachypnea. No respiratory distress, wheezes, rhonchi, rales, or tenderness.   Abdominal: Soft. Distended, He exhibits no mass. There is no tenderness. No hepatosplenomegaly, hernias and normoactive bowel sounds  Musculoskeletal: Normal range of motion. exhibits no edema.   Lymphadenopathy:     He has no cervical adenopathy.   Neurological:  alert and oriented to person, place, and time. not disoriented.   Skin: Skin is warm and dry. Capillary refill takes less 2 sec. No  "cyanosis or erythema. No pallor. Nails show clubbing.   Psychiatric: normal mood and affect. behavior is normal. Judgment and thought content normal.         Radiographs (ct chest and cxr) reviewed: results reviewed by direct vision  CTA Chest Non-Coronary 05/04/2020   Limited CT pulmonary angiogram without evidence for definite filling defects within the adequately opacified portions of the pulmonary arteries.     Airspace consolidation in the right lung base and to a lesser extent in the left lung base may represent areas of pneumonia.     Thickening of the wall of the mid to distal esophagus.  This could be further assessed with upper endoscopy or an esophagram to evaluate the clinical significance of this finding.     Mildly prominent lymph nodes in the subcarinal region.     Diffuse fatty change throughout the liver.     Emphysematous changes in the lungs bilaterally.      X-Ray Chest AP Portable 05/04/2020   The heart size is within normal limits.  There are calcifications in the aortic arch.     There are linear foci of atelectasis or parenchymal scarring in the lung bases bilaterally.     There are no pleural effusions.  The osseous structures are intact.        CT Chest Without Contrast 12/20/2019   Emphysema.  Atherosclerotic calcification of the aorta, origins of the right and left subclavian arteries and coronary arteries.    CT Chest Without Contrast 07/09/2019  Viewed by direct vision, poor quality images 74 images total not a sufficient study.  The lungs are hyperexpanded with bullous emphysematous changes seen in the upper lobes.  The lower lobes demonstrate prominent interlobar septa.  There are few patchy" bud in tree "appearance seen in the left lower lobe.  There are no pulmonary nodules or suspicious masses.  A small bilateral pleural effusion is present which could be physiologic.    The heart is normal in size however demonstrates coronary artery calcifications.  The tracheobronchial airway is " slightly dilated.  There is shotty lymph nodes noted of the mediastinum.  Note is made of calcifications of the thoracic aorta.     XR CHEST 1 VIEW03/20/2019   Negative chest x-ray     2D ECHO WITH COLOR FLOW DOPPLER 12/15/2017   3 - Normal left ventricular systolic function (EF 65-70%).     4 - Impaired LV relaxation, normal LAP (grade 1 diastolic dysfunction).       Labs reviewed       Lab Results   Component Value Date    WBC 7.50 05/06/2020    RBC 3.83 (L) 05/06/2020    HGB 12.0 (L) 05/06/2020    HCT 35.0 (L) 05/06/2020    MCV 91 05/06/2020    MCH 31.3 (H) 05/06/2020    MCHC 34.3 05/06/2020    RDW 14.4 05/06/2020     05/06/2020    MPV 7.9 (L) 05/06/2020    GRAN 6.0 05/06/2020    GRAN 79.3 (H) 05/06/2020    LYMPH 0.7 (L) 05/06/2020    LYMPH 9.6 (L) 05/06/2020    MONO 0.6 05/06/2020    MONO 8.2 05/06/2020    EOS 0.2 05/06/2020    BASO 0.10 05/06/2020    EOSINOPHIL 2.1 05/06/2020    BASOPHIL 0.8 05/06/2020     Results for MADELEINE EVANS (MRN [id number]) as of 12/20/2018 14:08   Ref. Range 12/11/2017 05:14   Eos # Latest Ref Range: 0.0 - 0.5 K/uL 0.5       PFT results reviewed  Pulmonary Functions Testing Results:  PFT results reviewed fev1 50 % at 1.53 with no bd response 12/15/2017      Spirometry bronchodilator, lung volume by gas dilution, diffusion capacity measured July 9, 2019.  The FEV1 to FVC ratio was only 54%, this indicates airflow obstruction.  The FEV1 measured 59% predicted at 1.75 L.  The of 5% improvement in the FEV1   following bronchodilator was not statistically significant, 12% seem to for statistical significance.  Total lung capacity was normal.  Diffusion was low, uncorrected for anemia present, at 61%.     There is  severe airflow obstruction, no bronchodilator response, no restriction, and loss of diffusion measured.  Clinical correlation recommended.        6 min walk study was done December 20, 2018.  Baseline room air saturation is 94%.  With ambulation O2 sat did fall 90% but no  lower.  Patient could walk 425 m.  Patient walked 82% of the reference  distance       EPWORTH SLEEPINESS SCALE SCORE 16 on 7/15/2020    Sleep study 8/4/2020= AHI 23.4    Plan:  Clinical impression is resonably certain and repeated evaluation prn +/- follow up will be needed as below.    Brad was seen today for follow-up and shortness of breath.    Diagnoses and all orders for this visit:    Chronic obstructive pulmonary disease, unspecified COPD type  -     predniSONE (DELTASONE) 5 MG tablet; Take 1 tablet (5 mg total) by mouth once daily.    Pulmonary emphysema, unspecified emphysema type  -     predniSONE (DELTASONE) 20 MG tablet; TAKE 1 TABLET BY MOUTH 2-3 TIMES WEEKLY AS NEEDED    Current chronic use of systemic steroids  -     predniSONE (DELTASONE) 20 MG tablet; TAKE 1 TABLET BY MOUTH 2-3 TIMES WEEKLY AS NEEDED    Steroid-dependent chronic obstructive pulmonary disease  -     predniSONE (DELTASONE) 20 MG tablet; TAKE 1 TABLET BY MOUTH 2-3 TIMES WEEKLY AS NEEDED    Interstitial pulmonary disease, unspecified  -     CT Chest Without Contrast; Future    Obstructive sleep apnea syndrome        Follow up in about 3 months (around 11/27/2020), or if symptoms worsen or fail to improve.    Discussed with patient above for education the following:      Patient Instructions   Will repeat Ct in November to monitor area of congestion seen on CTA from May.    Continue current COPD/Asthma medication regiment    Start Prednisone 5 mg a day every day. Try and limit 20 mg prednisone pills    When titration test is finalized will order CPAP or BIPAP machine

## 2020-08-27 NOTE — PATIENT INSTRUCTIONS
Will repeat Ct in November to monitor area of congestion seen on CTA from May.    Continue current COPD/Asthma medication regiment    Start Prednisone 5 mg a day every day. Try and limit 20 mg prednisone pills    When titration test is finalized will order CPAP or BIPAP machine    Continue supplemental oxygen at night.

## 2020-08-29 ENCOUNTER — LAB VISIT (OUTPATIENT)
Dept: PRIMARY CARE CLINIC | Facility: CLINIC | Age: 70
End: 2020-08-29
Payer: MEDICARE

## 2020-08-29 DIAGNOSIS — Z01.818 OTHER SPECIFIED PRE-OPERATIVE EXAMINATION: ICD-10-CM

## 2020-08-29 PROCEDURE — U0003 INFECTIOUS AGENT DETECTION BY NUCLEIC ACID (DNA OR RNA); SEVERE ACUTE RESPIRATORY SYNDROME CORONAVIRUS 2 (SARS-COV-2) (CORONAVIRUS DISEASE [COVID-19]), AMPLIFIED PROBE TECHNIQUE, MAKING USE OF HIGH THROUGHPUT TECHNOLOGIES AS DESCRIBED BY CMS-2020-01-R: HCPCS

## 2020-08-30 LAB — SARS-COV-2 RNA RESP QL NAA+PROBE: NOT DETECTED

## 2020-09-01 ENCOUNTER — PROCEDURE VISIT (OUTPATIENT)
Dept: SLEEP MEDICINE | Facility: HOSPITAL | Age: 70
End: 2020-09-01
Attending: NURSE PRACTITIONER
Payer: MEDICARE

## 2020-09-01 DIAGNOSIS — G47.33 OBSTRUCTIVE SLEEP APNEA SYNDROME: ICD-10-CM

## 2020-09-01 PROCEDURE — 95811 POLYSOM 6/>YRS CPAP 4/> PARM: CPT

## 2020-09-08 ENCOUNTER — TELEPHONE (OUTPATIENT)
Dept: PULMONOLOGY | Facility: CLINIC | Age: 70
End: 2020-09-08

## 2020-09-08 DIAGNOSIS — G47.33 OBSTRUCTIVE SLEEP APNEA SYNDROME: Primary | ICD-10-CM

## 2020-09-09 ENCOUNTER — OFFICE VISIT (OUTPATIENT)
Dept: PULMONOLOGY | Facility: CLINIC | Age: 70
End: 2020-09-09
Payer: MEDICARE

## 2020-09-09 VITALS
SYSTOLIC BLOOD PRESSURE: 175 MMHG | OXYGEN SATURATION: 93 % | TEMPERATURE: 97 F | WEIGHT: 188.81 LBS | BODY MASS INDEX: 29.63 KG/M2 | HEIGHT: 67 IN | HEART RATE: 100 BPM | RESPIRATION RATE: 20 BRPM | DIASTOLIC BLOOD PRESSURE: 80 MMHG

## 2020-09-09 DIAGNOSIS — G47.33 OBSTRUCTIVE SLEEP APNEA SYNDROME: ICD-10-CM

## 2020-09-09 DIAGNOSIS — J96.11 CHRONIC RESPIRATORY FAILURE WITH HYPOXIA: ICD-10-CM

## 2020-09-09 DIAGNOSIS — J44.9 CHRONIC OBSTRUCTIVE PULMONARY DISEASE, UNSPECIFIED COPD TYPE: Primary | ICD-10-CM

## 2020-09-09 PROCEDURE — 1159F MED LIST DOCD IN RCRD: CPT | Mod: S$GLB,,, | Performed by: NURSE PRACTITIONER

## 2020-09-09 PROCEDURE — 3008F PR BODY MASS INDEX (BMI) DOCUMENTED: ICD-10-PCS | Mod: CPTII,S$GLB,, | Performed by: NURSE PRACTITIONER

## 2020-09-09 PROCEDURE — 99214 OFFICE O/P EST MOD 30 MIN: CPT | Mod: S$GLB,,, | Performed by: NURSE PRACTITIONER

## 2020-09-09 PROCEDURE — 1101F PT FALLS ASSESS-DOCD LE1/YR: CPT | Mod: CPTII,S$GLB,, | Performed by: NURSE PRACTITIONER

## 2020-09-09 PROCEDURE — 1101F PR PT FALLS ASSESS DOC 0-1 FALLS W/OUT INJ PAST YR: ICD-10-PCS | Mod: CPTII,S$GLB,, | Performed by: NURSE PRACTITIONER

## 2020-09-09 PROCEDURE — 99214 PR OFFICE/OUTPT VISIT, EST, LEVL IV, 30-39 MIN: ICD-10-PCS | Mod: S$GLB,,, | Performed by: NURSE PRACTITIONER

## 2020-09-09 PROCEDURE — 3008F BODY MASS INDEX DOCD: CPT | Mod: CPTII,S$GLB,, | Performed by: NURSE PRACTITIONER

## 2020-09-09 PROCEDURE — 1126F AMNT PAIN NOTED NONE PRSNT: CPT | Mod: S$GLB,,, | Performed by: NURSE PRACTITIONER

## 2020-09-09 PROCEDURE — 99999 PR PBB SHADOW E&M-EST. PATIENT-LVL IV: CPT | Mod: PBBFAC,,, | Performed by: NURSE PRACTITIONER

## 2020-09-09 PROCEDURE — 99999 PR PBB SHADOW E&M-EST. PATIENT-LVL IV: ICD-10-PCS | Mod: PBBFAC,,, | Performed by: NURSE PRACTITIONER

## 2020-09-09 PROCEDURE — 1159F PR MEDICATION LIST DOCUMENTED IN MEDICAL RECORD: ICD-10-PCS | Mod: S$GLB,,, | Performed by: NURSE PRACTITIONER

## 2020-09-09 PROCEDURE — 1126F PR PAIN SEVERITY QUANTIFIED, NO PAIN PRESENT: ICD-10-PCS | Mod: S$GLB,,, | Performed by: NURSE PRACTITIONER

## 2020-09-09 NOTE — PROGRESS NOTES
9/9/2020    Brad Nowak  Office Note    Chief Complaint   Patient presents with    Sleep Apnea    Shortness of Breath       HPI:   9/9/20- states oxygen concentrator is not working, has turned up to 5 L with very little air coming out of tubing. Titration study completed. Order sent for CPAP.   Wanting to change from [facility] to different [employer].     SOB- states felt better the morning after CPAP titration, worse with exertion. No improvement on supplemental oxygen, improves with rest.   Associated with chest tightness that improves with prednisone.   Using budesonide daily with only slight improvement in SOB  Cough- recurrent problem, non productive, mild,       8/27/2020- had sleep study, positive results and waiting for Titration study.  No change in fatigue, SOB, daily, chronic complaint, using prednisone as needed on average 2-3 days weekly. Takes for Chest tightness.   Using nebulizer Budesonide 2 times daily with minimal improvement.  No cough, wearing supplemental oxygen at night.     7/15/2020- shortness of breath worsening slowly with time, states harder to breath following pneumonia May 2020 tx in [facility]. SOB severe and hinders his activity level, wearing oxygen only at night 5L. States it does not help much.   Complaint fatigue, daytime drowsiness, sleeping time varies, feeling drowsy in mornings, sleeps alone.  Cough- recurrent problem, occasionally productive light yellow mucous cream colored, no nocturnal arousals, no chest tightness or wheeze.  I05 therapy did not improve breathing, not currently on daily prednisone, using nebulizer as needed 2-3x daily. Trelegy    4/15/2020- SOB- unchanged, daily, worse with exertion, limiting daily activities and slows him down, decreased Prednisone 20 mg once tp twice a week. Has supplemental oxygen at 5L at night.     December 20, 2019- received oxygen concentrator, states does not improve SOB during day, SOB- stable, daily problem, worse with  exertion, improves with nebulized Albuterol. Increased Prednisone himself to 10 mg in am and 40 mg nightly every night for last 3 weeks.      September 20, 2019- SOB worsening, using albuterol nebulizer 1-2 x daily every day. Currently on Prednisone 10 mg daily, with 20mg taper when needed. States having to breath out of mouth to exhale. Stopped Dupixent due to lack of benefit.     June 20, 2019- Dupixent injections started Feb 2019, pt states no benefit, Breathing has worsened. Exercise tolerance has decreased. Currently taking 10 mg prednisone daily, taken prednisone 20 mg taper 5 x in 3 months.    03/20/2019-Breathing worsened in past month, on dupixent since January, Currently on Trelegy 1 puff daily and prednisone 10 mg daily every day.   No fever, no body aches, no pain, no wheeze, not able to do activities with out shortness of breath. Occasional non productive cough.     12/20/18- Breathing worsened, SOB with any exertion, not taking advair due to cost.  On long term prednisone therapy for uncontrolled severe persistent asthma. Eosinophil count artificially low due steroid use.    11/9/18- ER at [place] for chest pain, chest tightness, elevated pulse rate of 130, Discharged  Dx: COPD exacerbation, treated with albuterol nebulizer. States Advair cost to high, $183 month, States recently retired.   Using Duo neb 2x daily, 2-3 albuterol inhaler daily.    9/20/18- pt presents in clinic alone, states taking prednisone 10 mg daily and 20 mg daily twice a week when SOB, has taken 40 mg a day once last week. Uses rescue inhaler most days after climbing stairs at work. Has trouble sleeping. Discussed fasenra and pharmacy unable to contact patient. States he does not carry phone at work during day, does not answer numbers he does not know, and has no voicemail set up at moment. States using Advair Diskus daily. Cough every few days, non productive. Worse at night.      July 5, 2018- on prednisone 5/d with 20/d up  to 3 weekly.  Was in hospital December.  Breathing worse, saw asbestos  and advised 2nd opinion, to see Dr Rodriguez soon.  [facility]. Pt ues prednisone 5/d and not using trelegy- has one puff left on 14 day sample given in April and relates trelegy not seems to help.  April 5 - increase prednisone from 5/d to 20/d since last yr - breathing still poor.  Rest good - but not better on prednisone 20/d.  No big improvement on incruse. Sinus better on flonase.     12/28/2017 pt very active, bikes to work with tool boxes with no problem. Had had hospital stays every 6 months til started prednisone continiously  5 yrs ago.  Pt recently flared with recent NSR stay. Pt had pft with fev1 50% at 1.53 liter 12/15/2017.  Very mahmood still.  On breo and singulair.  Use spiriva in past.     From consult last month:Plan:   Pt has eosinophillic asthma and copd.  Pt should respond to singulair.  Higher dose steroid action plan needed.  He would likely do very well with il5 rx.     outpt soon on prednisone 60/d x 7 with taper, going to breo 200, and singulair would be good with current rx.  Would not strop prednisone.  outpt f/u recommended. I do no see asbestos complications.  History of Present Illness:  Cc is sob x 3 days.   Pt worked refinery as .  Stopped smoking yrs ago. FH + asthma with no prior h/o asthma.  Has had cough and wheezes and sob going back prior to [place].  Pt noted needed freq steroids so was kept on prednisone 5 mg daily with need to increase dose ever 2-3 months because of increase sob.  Pt uses breo 100 and xopenex works better than ventolin.  Pt has chr nasal drainage but no bad infections.  abx not used.  He denies spiriva helped in past and denies using daliresp.    Pt rides bike to work - bike weighs 300 lbs with tools for trade.  Pt very active and still works - lives with daughter.    Reviewed note    HPI:      8/21/2018 - Pt with known COPD,  asthma (sees Dr Eddy) here for second opinion regarding asbestos lung disease.  Started working on [employer] in .  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  He started working as a  in  until present time and reports that he has had asbestos exposure.  He denies working directly with asbestos but did work in the vicinity.  He reports that Dr Eddy thinks he has asbestos issues.  Has had PFT but doesn't remember when.  He has chronic SOB, SHOEMAKER (being addressed by Dr Eddy)  He only uses prn inhalers.  Has some chronic cough and wheezing.  Feels that SOB is worse but he rides bike around at work (weights about 200#).        The chief compliant  problem is varies with instablilty at time      PFSH:  Past Medical History:   Diagnosis Date    COPD (chronic obstructive pulmonary disease)     Hyperlipidemia     Hypertension     Obstructive sleep apnea          Past Surgical History:   Procedure Laterality Date    INGUINAL HERNIA REPAIR Left     SHOULDER ARTHROSCOPY Right     WRIST SURGERY Left      Social History     Tobacco Use    Smoking status: Former Smoker     Packs/day: 2.00     Years: 40.00     Pack years: 80.00     Quit date: 3/24/2009     Years since quittin.4    Smokeless tobacco: Never Used   Substance Use Topics    Alcohol use: Yes     Comment: 6-12 beers/night; some days none.    Drug use: No     Family History   Problem Relation Age of Onset    Cancer Mother     Asthma Mother     Heart disease Father     COPD Father     Cancer Father     Cancer Brother         colon cancer    No Known Problems Brother     No Known Problems Brother      Review of patient's allergies indicates:  No Known Allergies  I have reviewed past medical, family, and social history. I have reviewed previous nurse notes.    Performance Status:The patient's activity level is  "household activities.         Review of Systems   Constitutional: Negative for activity change, appetite change, chills, diaphoresis, fever and unexpected weight change. Positive for fatigue  HENT: Negative for dental problem, postnasal drip, rhinorrhea, sinus pain, sneezing, sore throat, trouble swallowing and voice change.  Respiratory: Negative for wheezing and stridor.  Positive for shortness of breath, apnea, chest tightness,   Cardiovascular: Negative for chest pain, palpitations and leg swelling.   Musculoskeletal: Negative for gait problem, myalgias and neck pain. Positive for myalgias  Skin: Negative for color change and pallor.   Allergic/Immunologic: Negative for environmental allergies and food allergies.   Neurological: Negative for dizziness, speech difficulty, weakness, light-headedness, numbness and headaches.   Hematological: Negative for adenopathy. Does not bruise/bleed easily.   Psychiatric/Behavioral: Negative for dysphoric mood. The patient is not nervous/anxious.  Positive for sleep disturbance       Exam:Comprehensive exam done. BP (!) 175/80 (BP Location: Right arm, Patient Position: Sitting, BP Method: Medium (Automatic))   Pulse 100   Temp 97.4 °F (36.3 °C) (Temporal)   Resp 20   Ht 5' 7" (1.702 m)   Wt 85.6 kg (188 lb 13.2 oz)   SpO2 (!) 93% Comment: room air  BMI 29.57 kg/m²   Exam included Vitals as listed  Constitutional: He is oriented to person, place, and time. He appears well-developed. No distress.   Nose: Nose normal.   Mouth/Throat: Uvula is midline, oropharynx is clear and moist and mucous membranes are normal. No dental caries. No oropharyngeal exudate, posterior oropharyngeal edema, posterior oropharyngeal erythema or tonsillar abscesses. Mallapatti (M) score II  Eyes: Pupils are equal, round, and reactive to light.   Neck: No JVD present. No thyromegaly present.   Cardiovascular: Normal rate, regular rhythm and normal heart sounds. Exam reveals no gallop and no " friction rub.   No murmur heard.  Pulmonary/Chest: Effort normal and breath sounds diminished bilaterally, Clear. No accessory muscle usage or stridor. No apnea and no tachypnea. No respiratory distress, wheezes, rhonchi, rales, or tenderness.   Abdominal: Soft. Distended, He exhibits no mass. There is no tenderness. No hepatosplenomegaly, hernias and normoactive bowel sounds  Musculoskeletal: Normal range of motion. exhibits no edema.   Lymphadenopathy:     He has no cervical adenopathy.   Neurological:  alert and oriented to person, place, and time. not disoriented.   Skin: Skin is warm and dry. Capillary refill takes less 2 sec. No cyanosis or erythema. No pallor. Nails show clubbing.   Psychiatric: normal mood and affect. behavior is normal. Judgment and thought content normal.         Radiographs (ct chest and cxr) reviewed: results reviewed by direct vision  CTA Chest Non-Coronary 05/04/2020   Limited CT pulmonary angiogram without evidence for definite filling defects within the adequately opacified portions of the pulmonary arteries.  Airspace consolidation in the right lung base and to a lesser extent in the left lung base may represent areas of pneumonia.  Thickening of the wall of the mid to distal esophagus.  This could be further assessed with upper endoscopy or an esophagram to evaluate the clinical significance of this finding.  Mildly prominent lymph nodes in the subcarinal region.  Diffuse fatty change throughout the liver.  Emphysematous changes in the lungs bilaterally.    X-Ray Chest AP Portable 05/04/2020   The heart size is within normal limits.  There are calcifications in the aortic arch.  There are linear foci of atelectasis or parenchymal scarring in the lung bases bilaterally.  There are no pleural effusions.  The osseous structures are intact.    CT Chest Without Contrast 12/20/2019   Emphysema.  Atherosclerotic calcification of the aorta, origins of the right and left subclavian arteries  "and coronary arteries.    CT Chest Without Contrast 07/09/2019  Viewed by direct vision, poor quality images 74 images total not a sufficient study.  The lungs are hyperexpanded with bullous emphysematous changes seen in the upper lobes.  The lower lobes demonstrate prominent interlobar septa.  There are few patchy" bud in tree "appearance seen in the left lower lobe.  There are no pulmonary nodules or suspicious masses.  A small bilateral pleural effusion is present which could be physiologic.    The heart is normal in size however demonstrates coronary artery calcifications.  The tracheobronchial airway is slightly dilated.  There is shotty lymph nodes noted of the mediastinum.  Note is made of calcifications of the thoracic aorta.     XR CHEST 1 VIEW 03/20/2019   Negative chest x-ray     2D ECHO WITH COLOR FLOW DOPPLER 12/15/2017   3 - Normal left ventricular systolic function (EF 65-70%).     4 - Impaired LV relaxation, normal LAP (grade 1 diastolic dysfunction).       Labs reviewed       Lab Results   Component Value Date    WBC 7.50 05/06/2020    RBC 3.83 (L) 05/06/2020    HGB 12.0 (L) 05/06/2020    HCT 35.0 (L) 05/06/2020    MCV 91 05/06/2020    MCH 31.3 (H) 05/06/2020    MCHC 34.3 05/06/2020    RDW 14.4 05/06/2020     05/06/2020    MPV 7.9 (L) 05/06/2020    GRAN 6.0 05/06/2020    GRAN 79.3 (H) 05/06/2020    LYMPH 0.7 (L) 05/06/2020    LYMPH 9.6 (L) 05/06/2020    MONO 0.6 05/06/2020    MONO 8.2 05/06/2020    EOS 0.2 05/06/2020    BASO 0.10 05/06/2020    EOSINOPHIL 2.1 05/06/2020    BASOPHIL 0.8 05/06/2020     Results for MADELEINE EVANS (MRN [id number]) as of 12/20/2018 14:08   Ref. Range 12/11/2017 05:14   Eos # Latest Ref Range: 0.0 - 0.5 K/uL 0.5       PFT results reviewed  Pulmonary Functions Testing Results:  PFT results reviewed fev1 50 % at 1.53 with no bd response 12/15/2017      Spirometry bronchodilator, lung volume by gas dilution, diffusion capacity measured July 9, 2019.  The FEV1 to FVC " ratio was only 54%, this indicates airflow obstruction.  The FEV1 measured 59% predicted at 1.75 L.  The of 5% improvement in the FEV1   following bronchodilator was not statistically significant, 12% seem to for statistical significance.  Total lung capacity was normal.  Diffusion was low, uncorrected for anemia present, at 61%.     There is  severe airflow obstruction, no bronchodilator response, no restriction, and loss of diffusion measured.  Clinical correlation recommended.        6 min walk study was done December 20, 2018.  Baseline room air saturation is 94%.  With ambulation O2 sat did fall 90% but no lower.  Patient could walk 425 m.  Patient walked 82% of the reference  distance       EPWORTH SLEEPINESS SCALE SCORE 16 on 7/15/2020    Sleep study 8/4/2020= AHI 23.4    Plan:  Clinical impression is resonably certain and repeated evaluation prn +/- follow up will be needed as below.    Brad was seen today for sleep apnea and shortness of breath.    Diagnoses and all orders for this visit:    Chronic obstructive pulmonary disease, unspecified COPD type  -     Six Minute Walk Test to qualify for Home Oxygen; Future    Chronic respiratory failure with hypoxia  -     Six Minute Walk Test to qualify for Home Oxygen; Future    Obstructive sleep apnea syndrome     - CPAP for home use     Follow up in about 2 months (around 11/9/2020), or if symptoms worsen or fail to improve.    Discussed with patient above for education the following:      Patient Instructions   Ordering CPAP machine from [facility],     Need to call DME about oxygen concentrator being broken.     Handy cap tag written

## 2020-09-09 NOTE — PATIENT INSTRUCTIONS
Ordering CPAP machine from [facility],     Need to call DME about oxygen concentrator being broken.     Handy cap tag written

## 2020-10-15 ENCOUNTER — TELEPHONE (OUTPATIENT)
Dept: PULMONOLOGY | Facility: CLINIC | Age: 70
End: 2020-10-15

## 2020-10-15 ENCOUNTER — OFFICE VISIT (OUTPATIENT)
Dept: PULMONOLOGY | Facility: CLINIC | Age: 70
End: 2020-10-15
Payer: MEDICARE

## 2020-10-15 VITALS
BODY MASS INDEX: 29.52 KG/M2 | OXYGEN SATURATION: 95 % | SYSTOLIC BLOOD PRESSURE: 151 MMHG | DIASTOLIC BLOOD PRESSURE: 76 MMHG | WEIGHT: 188.5 LBS | HEART RATE: 78 BPM

## 2020-10-15 DIAGNOSIS — J44.9 CHRONIC OBSTRUCTIVE PULMONARY DISEASE, UNSPECIFIED COPD TYPE: ICD-10-CM

## 2020-10-15 DIAGNOSIS — Z92.241 STEROID-DEPENDENT CHRONIC OBSTRUCTIVE PULMONARY DISEASE: ICD-10-CM

## 2020-10-15 DIAGNOSIS — G47.33 OBSTRUCTIVE SLEEP APNEA SYNDROME: Primary | ICD-10-CM

## 2020-10-15 DIAGNOSIS — J44.9 STEROID-DEPENDENT CHRONIC OBSTRUCTIVE PULMONARY DISEASE: ICD-10-CM

## 2020-10-15 PROCEDURE — 99999 PR PBB SHADOW E&M-EST. PATIENT-LVL IV: ICD-10-PCS | Mod: PBBFAC,,, | Performed by: NURSE PRACTITIONER

## 2020-10-15 PROCEDURE — 99999 PR PBB SHADOW E&M-EST. PATIENT-LVL IV: CPT | Mod: PBBFAC,,, | Performed by: NURSE PRACTITIONER

## 2020-10-15 PROCEDURE — 1101F PT FALLS ASSESS-DOCD LE1/YR: CPT | Mod: CPTII,S$GLB,, | Performed by: NURSE PRACTITIONER

## 2020-10-15 PROCEDURE — 1101F PR PT FALLS ASSESS DOC 0-1 FALLS W/OUT INJ PAST YR: ICD-10-PCS | Mod: CPTII,S$GLB,, | Performed by: NURSE PRACTITIONER

## 2020-10-15 PROCEDURE — 3008F PR BODY MASS INDEX (BMI) DOCUMENTED: ICD-10-PCS | Mod: CPTII,S$GLB,, | Performed by: NURSE PRACTITIONER

## 2020-10-15 PROCEDURE — 1159F MED LIST DOCD IN RCRD: CPT | Mod: S$GLB,,, | Performed by: NURSE PRACTITIONER

## 2020-10-15 PROCEDURE — 3008F BODY MASS INDEX DOCD: CPT | Mod: CPTII,S$GLB,, | Performed by: NURSE PRACTITIONER

## 2020-10-15 PROCEDURE — 99213 OFFICE O/P EST LOW 20 MIN: CPT | Mod: S$GLB,,, | Performed by: NURSE PRACTITIONER

## 2020-10-15 PROCEDURE — 1126F PR PAIN SEVERITY QUANTIFIED, NO PAIN PRESENT: ICD-10-PCS | Mod: S$GLB,,, | Performed by: NURSE PRACTITIONER

## 2020-10-15 PROCEDURE — 1159F PR MEDICATION LIST DOCUMENTED IN MEDICAL RECORD: ICD-10-PCS | Mod: S$GLB,,, | Performed by: NURSE PRACTITIONER

## 2020-10-15 PROCEDURE — 99213 PR OFFICE/OUTPT VISIT, EST, LEVL III, 20-29 MIN: ICD-10-PCS | Mod: S$GLB,,, | Performed by: NURSE PRACTITIONER

## 2020-10-15 PROCEDURE — 1126F AMNT PAIN NOTED NONE PRSNT: CPT | Mod: S$GLB,,, | Performed by: NURSE PRACTITIONER

## 2020-10-15 NOTE — PATIENT INSTRUCTIONS
Continue current COPD medication regiment    Continue 5 mg prednisone daily  Ok to stop budesonide nebulizer if no benefit, continue Trelegy daily    Changing to a different CPAP mask

## 2020-10-15 NOTE — PROGRESS NOTES
10/15/2020    Brad Nowak  Office Note    Chief Complaint   Patient presents with    Follow-up     feeling about the same    COPD    Shortness of Breath       HPI:   10/15/2020- received CPAP machine from [facility] on Sept 17th, states wearing every night between 9:30- 5 am. States his daytime drowsiness has worsened since starting CPAP, still trying to adjust to wearing CPAP full face mask. States not comfortable. Has air leaking from above nose.   Returned oxygen concentrator to [facility] due to it not working.   Cough- recurrent problem, unchanged, non productive, SOB- unchanged, using Trelegy daily, budesonide daily, albuterol daily, 5 mg prednisone. Has to stop outdoor chores due to SOB, no limits on indoor activities.       9/9/20- states oxygen concentrator is not working, has turned up to 5 L with very little air coming out of tubing. Titration study completed. Order sent for CPAP.   Wanting to change from [facility] to different DME company.     SOB- states felt better the morning after CPAP titration, worse with exertion. No improvement on supplemental oxygen, improves with rest.   Associated with chest tightness that improves with prednisone.   Using budesonide daily with only slight improvement in SOB  Cough- recurrent problem, non productive, mild,       8/27/2020- had sleep study, positive results and waiting for Titration study.  No change in fatigue, SOB, daily, chronic complaint, using prednisone as needed on average 2-3 days weekly. Takes for Chest tightness.   Using nebulizer Budesonide 2 times daily with minimal improvement.  No cough, wearing supplemental oxygen at night.     7/15/2020- shortness of breath worsening slowly with time, states harder to breath following pneumonia May 2020 tx in [facility]. SOB severe and hinders his activity level, wearing oxygen only at night 5L. States it does not help much.   Complaint fatigue, daytime drowsiness, sleeping time varies, feeling drowsy  in mornings, sleeps alone.  Cough- recurrent problem, occasionally productive light yellow mucous cream colored, no nocturnal arousals, no chest tightness or wheeze.  I05 therapy did not improve breathing, not currently on daily prednisone, using nebulizer as needed 2-3x daily. Trelegy    4/15/2020- SOB- unchanged, daily, worse with exertion, limiting daily activities and slows him down, decreased Prednisone 20 mg once tp twice a week. Has supplemental oxygen at 5L at night.     December 20, 2019- received oxygen concentrator, states does not improve SOB during day, SOB- stable, daily problem, worse with exertion, improves with nebulized Albuterol. Increased Prednisone himself to 10 mg in am and 40 mg nightly every night for last 3 weeks.      September 20, 2019- SOB worsening, using albuterol nebulizer 1-2 x daily every day. Currently on Prednisone 10 mg daily, with 20mg taper when needed. States having to breath out of mouth to exhale. Stopped Dupixent due to lack of benefit.     June 20, 2019- Dupixent injections started Feb 2019, pt states no benefit, Breathing has worsened. Exercise tolerance has decreased. Currently taking 10 mg prednisone daily, taken prednisone 20 mg taper 5 x in 3 months.    03/20/2019-Breathing worsened in past month, on dupixent since January, Currently on Trelegy 1 puff daily and prednisone 10 mg daily every day.   No fever, no body aches, no pain, no wheeze, not able to do activities with out shortness of breath. Occasional non productive cough.     12/20/18- Breathing worsened, SOB with any exertion, not taking advair due to cost.  On long term prednisone therapy for uncontrolled severe persistent asthma. Eosinophil count artificially low due steroid use.    11/9/18- ER at [place] for chest pain, chest tightness, elevated pulse rate of 130, Discharged  Dx: COPD exacerbation, treated with albuterol nebulizer. States Advair cost to high, $183 month, States recently retired.   Using  Duo neb 2x daily, 2-3 albuterol inhaler daily.    9/20/18- pt presents in clinic alone, states taking prednisone 10 mg daily and 20 mg daily twice a week when SOB, has taken 40 mg a day once last week. Uses rescue inhaler most days after climbing stairs at work. Has trouble sleeping. Discussed fasenra and pharmacy unable to contact patient. States he does not carry phone at work during day, does not answer numbers he does not know, and has no voicemail set up at moment. States using Advair Diskus daily. Cough every few days, non productive. Worse at night.      July 5, 2018- on prednisone 5/d with 20/d up to 3 weekly.  Was in hospital December.  Breathing worse, saw asbestos  and advised 2nd opinion, to see Dr Rodriguez soon.  No fasenra as pharmacy unable to contact pt. Pt ues prednisone 5/d and not using trelegy- has one puff left on 14 day sample given in April and relates trelegy not seems to help.  April 5 - increase prednisone from 5/d to 20/d since last yr - breathing still poor.  Rest good - but not better on prednisone 20/d.  No big improvement on incruse. Sinus better on flonase.     12/28/2017 pt very active, bikes to work with tool boxes with no problem. Had had hospital stays every 6 months til started prednisone continiously  5 yrs ago.  Pt recently flared with recent [facility] stay. Pt had pft with fev1 50% at 1.53 liter 12/15/2017.  Very mahmood still.  On breo and singulair.  Use spiriva in past.     From consult last month:Plan:   Pt has eosinophillic asthma and copd.  Pt should respond to singulair.  Higher dose steroid action plan needed.  He would likely do very well with il5 rx.     outpt soon on prednisone 60/d x 7 with taper, going to breo 200, and singulair would be good with current rx.  Would not strop prednisone.  outpt f/u recommended. I do no see asbestos complications.  History of Present Illness:  Cc is sob x 3 days.   Pt worked [employer] as .  Stopped smoking yrs ago. FH +  asthma with no prior h/o asthma.  Has had cough and wheezes and sob going back prior to [place].  Pt noted needed freq steroids so was kept on prednisone 5 mg daily with need to increase dose ever 2-3 months because of increase sob.  Pt uses breo 100 and xopenex works better than ventolin.  Pt has chr nasal drainage but no bad infections.  abx not used.  He denies spiriva helped in past and denies using daliresp.    Pt rides bike to work - bike weighs 300 lbs with tools for trade.  Pt very active and still works - lives with daughter.    Reviewed note    HPI:      2018 - Pt with known COPD, asthma (sees Dr Eddy) here for second opinion regarding asbestos lung disease.  Started working on [employer] in .  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  He started working as a  in  until present time and reports that he has had asbestos exposure.  He denies working directly with asbestos but did work in the vicinity.  He reports that Dr Eddy thinks he has asbestos issues.  Has had PFT but doesn't remember when.  He has chronic SOB, SHOEMAKER (being addressed by Dr Eddy)  He only uses prn inhalers.  Has some chronic cough and wheezing.  Feels that SOB is worse but he rides bike around at work (weights about 200#).        The chief compliant  problem is stable      PFSH:  Past Medical History:   Diagnosis Date    COPD (chronic obstructive pulmonary disease)     Hyperlipidemia     Hypertension     Obstructive sleep apnea          Past Surgical History:   Procedure Laterality Date    INGUINAL HERNIA REPAIR Left     SHOULDER ARTHROSCOPY Right     WRIST SURGERY Left      Social History     Tobacco Use    Smoking status: Former Smoker     Packs/day: 2.00     Years: 40.00     Pack years: 80.00     Quit date: 3/24/2009     Years since quittin.5    Smokeless tobacco: Never  Used   Substance Use Topics    Alcohol use: Yes     Comment: 6-12 beers/night; some days none.    Drug use: No     Family History   Problem Relation Age of Onset    Cancer Mother     Asthma Mother     Heart disease Father     COPD Father     Cancer Father     Cancer Brother         colon cancer    No Known Problems Brother     No Known Problems Brother      Review of patient's allergies indicates:  No Known Allergies  I have reviewed past medical, family, and social history. I have reviewed previous nurse notes.    Performance Status:The patient's activity level is household activities.         Review of Systems   Constitutional: Negative for activity change, appetite change, chills, diaphoresis, fever and unexpected weight change. Positive for fatigue  HENT: Negative for dental problem, postnasal drip, rhinorrhea, sinus pain, sneezing, sore throat, trouble swallowing and voice change.  Respiratory: Negative for wheezing and stridor.  Positive for shortness of breath, apnea, chest tightness, cough  Cardiovascular: Negative for chest pain, palpitations and leg swelling.   Musculoskeletal: Negative for gait problem, myalgias and neck pain.  Skin: Negative for color change and pallor.   Allergic/Immunologic: Negative for environmental allergies and food allergies.   Neurological: Negative for dizziness, speech difficulty, weakness, light-headedness, numbness. Positive for headaches.   Hematological: Negative for adenopathy. Does not bruise/bleed easily.   Psychiatric/Behavioral: Negative for dysphoric mood. The patient is not nervous/anxious.  Positive for sleep disturbance       Exam:Comprehensive exam done. BP (!) 151/76 (BP Location: Left arm, Patient Position: Sitting)   Pulse 78   Wt 85.5 kg (188 lb 7.9 oz)   SpO2 95% Comment: on room air at rest  BMI 29.52 kg/m²   Exam included Vitals as listed  Constitutional: He is oriented to person, place, and time. He appears well-developed. No distress.    Nose: Nose normal.   Mouth/Throat: Uvula is midline, oropharynx is clear and moist and mucous membranes are normal. No dental caries. No oropharyngeal exudate, posterior oropharyngeal edema, posterior oropharyngeal erythema or tonsillar abscesses. Mallapatti (M) score II  Eyes: Pupils are equal, round, and reactive to light.   Neck: No JVD present. No thyromegaly present.   Cardiovascular: Normal rate, regular rhythm and normal heart sounds. Exam reveals no gallop and no friction rub.   No murmur heard.  Pulmonary/Chest: Effort normal and breath sounds diminished bilaterally, Clear. No accessory muscle usage or stridor. No apnea and no tachypnea. No respiratory distress, wheezes, rhonchi, rales, or tenderness.   Abdominal: Soft. Distended, He exhibits no mass. There is no tenderness. No hepatosplenomegaly, hernias and normoactive bowel sounds  Musculoskeletal: Normal range of motion. exhibits no edema.   Lymphadenopathy:     He has no cervical adenopathy.   Neurological:  alert and oriented to person, place, and time. not disoriented.   Skin: Skin is warm and dry. Capillary refill takes less 2 sec. No cyanosis or erythema. No pallor. Nails show clubbing.   Psychiatric: normal mood and affect. behavior is normal. Judgment and thought content normal.         Radiographs (ct chest and cxr) reviewed: results reviewed by direct vision  CTA Chest Non-Coronary 05/04/2020   Limited CT pulmonary angiogram without evidence for definite filling defects within the adequately opacified portions of the pulmonary arteries.  Airspace consolidation in the right lung base and to a lesser extent in the left lung base may represent areas of pneumonia.  Thickening of the wall of the mid to distal esophagus.  This could be further assessed with upper endoscopy or an esophagram to evaluate the clinical significance of this finding.  Mildly prominent lymph nodes in the subcarinal region.  Diffuse fatty change throughout the  "liver.  Emphysematous changes in the lungs bilaterally.    X-Ray Chest AP Portable 05/04/2020   The heart size is within normal limits.  There are calcifications in the aortic arch.  There are linear foci of atelectasis or parenchymal scarring in the lung bases bilaterally.  There are no pleural effusions.  The osseous structures are intact.    CT Chest Without Contrast 12/20/2019   Emphysema.  Atherosclerotic calcification of the aorta, origins of the right and left subclavian arteries and coronary arteries.    CT Chest Without Contrast 07/09/2019  Viewed by direct vision, poor quality images 74 images total not a sufficient study.  The lungs are hyperexpanded with bullous emphysematous changes seen in the upper lobes.  The lower lobes demonstrate prominent interlobar septa.  There are few patchy" bud in tree "appearance seen in the left lower lobe.  There are no pulmonary nodules or suspicious masses.  A small bilateral pleural effusion is present which could be physiologic.    The heart is normal in size however demonstrates coronary artery calcifications.  The tracheobronchial airway is slightly dilated.  There is shotty lymph nodes noted of the mediastinum.  Note is made of calcifications of the thoracic aorta.     XR CHEST 1 VIEW 03/20/2019   Negative chest x-ray     2D ECHO WITH COLOR FLOW DOPPLER 12/15/2017   3 - Normal left ventricular systolic function (EF 65-70%).     4 - Impaired LV relaxation, normal LAP (grade 1 diastolic dysfunction).       Labs reviewed       Lab Results   Component Value Date    WBC 7.50 05/06/2020    RBC 3.83 (L) 05/06/2020    HGB 12.0 (L) 05/06/2020    HCT 35.0 (L) 05/06/2020    MCV 91 05/06/2020    MCH 31.3 (H) 05/06/2020    MCHC 34.3 05/06/2020    RDW 14.4 05/06/2020     05/06/2020    MPV 7.9 (L) 05/06/2020    GRAN 6.0 05/06/2020    GRAN 79.3 (H) 05/06/2020    LYMPH 0.7 (L) 05/06/2020    LYMPH 9.6 (L) 05/06/2020    MONO 0.6 05/06/2020    MONO 8.2 05/06/2020    EOS 0.2 " 05/06/2020    BASO 0.10 05/06/2020    EOSINOPHIL 2.1 05/06/2020    BASOPHIL 0.8 05/06/2020     Results for MADELEINE EVANS (MRN [id number]) as of 12/20/2018 14:08   Ref. Range 12/11/2017 05:14   Eos # Latest Ref Range: 0.0 - 0.5 K/uL 0.5       PFT results reviewed  Pulmonary Functions Testing Results:  PFT results reviewed fev1 50 % at 1.53 with no bd response 12/15/2017      Spirometry bronchodilator, lung volume by gas dilution, diffusion capacity measured July 9, 2019.  The FEV1 to FVC ratio was only 54%, this indicates airflow obstruction.  The FEV1 measured 59% predicted at 1.75 L.  The of 5% improvement in the FEV1   following bronchodilator was not statistically significant, 12% seem to for statistical significance.  Total lung capacity was normal.  Diffusion was low, uncorrected for anemia present, at 61%.     There is  severe airflow obstruction, no bronchodilator response, no restriction, and loss of diffusion measured.  Clinical correlation recommended.        6 min walk study was done December 20, 2018.  Baseline room air saturation is 94%.  With ambulation O2 sat did fall 90% but no lower.  Patient could walk 425 m.  Patient walked 82% of the reference  distance       EPWORTH SLEEPINESS SCALE SCORE 16 on 7/15/2020    Sleep study 8/4/2020= AHI 23.4    Compliance report 9/15- 10/14   Percent of days with usage >= 4 hours 83.3%    Plan:  Clinical impression is resonably certain and repeated evaluation prn +/- follow up will be needed as below.    Madeleine was seen today for follow-up, copd and shortness of breath.    Diagnoses and all orders for this visit:    Obstructive sleep apnea syndrome  -     CPAP/BIPAP SUPPLIES    Chronic obstructive pulmonary disease, unspecified COPD type    Steroid-dependent chronic obstructive pulmonary disease         Follow up if symptoms worsen or fail to improve.    Discussed with patient above for education the following:      Patient Instructions   Continue current COPD  medication regiment    Continue 5 mg prednisone daily  Ok to stop budesonide nebulizer if no benefit, continue Trelegy daily    Changing to a different CPAP mask

## 2020-10-27 ENCOUNTER — PATIENT MESSAGE (OUTPATIENT)
Dept: PULMONOLOGY | Facility: CLINIC | Age: 70
End: 2020-10-27

## 2020-11-09 ENCOUNTER — OFFICE VISIT (OUTPATIENT)
Dept: PULMONOLOGY | Facility: CLINIC | Age: 70
End: 2020-11-09
Payer: MEDICARE

## 2020-11-09 ENCOUNTER — TELEPHONE (OUTPATIENT)
Dept: PULMONOLOGY | Facility: CLINIC | Age: 70
End: 2020-11-09

## 2020-11-09 VITALS
WEIGHT: 191.56 LBS | OXYGEN SATURATION: 96 % | SYSTOLIC BLOOD PRESSURE: 137 MMHG | DIASTOLIC BLOOD PRESSURE: 68 MMHG | HEIGHT: 67 IN | BODY MASS INDEX: 30.07 KG/M2 | HEART RATE: 67 BPM

## 2020-11-09 DIAGNOSIS — Z79.52 CURRENT CHRONIC USE OF SYSTEMIC STEROIDS: ICD-10-CM

## 2020-11-09 DIAGNOSIS — Z92.241 STEROID-DEPENDENT CHRONIC OBSTRUCTIVE PULMONARY DISEASE: ICD-10-CM

## 2020-11-09 DIAGNOSIS — J43.9 PULMONARY EMPHYSEMA, UNSPECIFIED EMPHYSEMA TYPE: Primary | ICD-10-CM

## 2020-11-09 DIAGNOSIS — J44.9 STEROID-DEPENDENT CHRONIC OBSTRUCTIVE PULMONARY DISEASE: ICD-10-CM

## 2020-11-09 DIAGNOSIS — G47.33 OBSTRUCTIVE SLEEP APNEA SYNDROME: ICD-10-CM

## 2020-11-09 PROCEDURE — 3008F PR BODY MASS INDEX (BMI) DOCUMENTED: ICD-10-PCS | Mod: CPTII,S$GLB,, | Performed by: NURSE PRACTITIONER

## 2020-11-09 PROCEDURE — 1159F MED LIST DOCD IN RCRD: CPT | Mod: S$GLB,,, | Performed by: NURSE PRACTITIONER

## 2020-11-09 PROCEDURE — 1101F PR PT FALLS ASSESS DOC 0-1 FALLS W/OUT INJ PAST YR: ICD-10-PCS | Mod: CPTII,S$GLB,, | Performed by: NURSE PRACTITIONER

## 2020-11-09 PROCEDURE — 3008F BODY MASS INDEX DOCD: CPT | Mod: CPTII,S$GLB,, | Performed by: NURSE PRACTITIONER

## 2020-11-09 PROCEDURE — 1126F PR PAIN SEVERITY QUANTIFIED, NO PAIN PRESENT: ICD-10-PCS | Mod: S$GLB,,, | Performed by: NURSE PRACTITIONER

## 2020-11-09 PROCEDURE — 1101F PT FALLS ASSESS-DOCD LE1/YR: CPT | Mod: CPTII,S$GLB,, | Performed by: NURSE PRACTITIONER

## 2020-11-09 PROCEDURE — 99213 PR OFFICE/OUTPT VISIT, EST, LEVL III, 20-29 MIN: ICD-10-PCS | Mod: S$GLB,,, | Performed by: NURSE PRACTITIONER

## 2020-11-09 PROCEDURE — 1159F PR MEDICATION LIST DOCUMENTED IN MEDICAL RECORD: ICD-10-PCS | Mod: S$GLB,,, | Performed by: NURSE PRACTITIONER

## 2020-11-09 PROCEDURE — 99999 PR PBB SHADOW E&M-EST. PATIENT-LVL IV: ICD-10-PCS | Mod: PBBFAC,,, | Performed by: NURSE PRACTITIONER

## 2020-11-09 PROCEDURE — 99213 OFFICE O/P EST LOW 20 MIN: CPT | Mod: S$GLB,,, | Performed by: NURSE PRACTITIONER

## 2020-11-09 PROCEDURE — 99999 PR PBB SHADOW E&M-EST. PATIENT-LVL IV: CPT | Mod: PBBFAC,,, | Performed by: NURSE PRACTITIONER

## 2020-11-09 PROCEDURE — 1126F AMNT PAIN NOTED NONE PRSNT: CPT | Mod: S$GLB,,, | Performed by: NURSE PRACTITIONER

## 2020-11-09 RX ORDER — PREDNISONE 20 MG/1
TABLET ORAL
Qty: 21 TABLET | Refills: 2 | Status: SHIPPED | OUTPATIENT
Start: 2020-11-09 | End: 2021-04-15 | Stop reason: SDUPTHER

## 2020-11-09 RX ORDER — PREDNISONE 10 MG/1
10 TABLET ORAL DAILY
Qty: 90 TABLET | Refills: 1 | Status: SHIPPED | OUTPATIENT
Start: 2020-11-09 | End: 2021-04-15 | Stop reason: SDUPTHER

## 2020-11-09 NOTE — TELEPHONE ENCOUNTER
Contacted [facility] to noemy compliance report and to see why patient hasn't received the new face mask. Spoke with Edy - she stated they hadn't received the order. Will be resending.     ----- Message from Nidia Guevara NP sent at 11/9/2020 10:12 AM CST -----  Needs compliance study from [facility]. And pt did not receive new mask as ordered. Need to know why.

## 2020-11-09 NOTE — PATIENT INSTRUCTIONS
Waiting on compliance report will repeat request for different mask    Continue COPD medication regiment    increasing prednisone to 10 mg daily    Use CPAP nightly

## 2020-11-09 NOTE — PROGRESS NOTES
11/9/2020    Brad Nowak  Office Note    Chief Complaint   Patient presents with    Follow-up     cpap compliance f/u    COPD     Need cpap compliance    HPI:   11/9/2020- states did not receive new mask from [facility], states when he wakes up mask is not correct on face and air is leaking.   Wearing CPAP nightly, states feels about the same.   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 On 5 mg prednisone daily. Using Albuterol 20 mg 2-3 x weekly.   Cough, chest tightness, wheeze- unchanged, daily complaints.     10/15/2020- received CPAP machine from [facility] on Sept 17th, states wearing every night between 9:30- 5 am. States his daytime drowsiness has worsened since starting CPAP, still trying to adjust to wearing CPAP full face mask. States not comfortable. Has air leaking from above nose.   Returned oxygen concentrator to [facility] due to it not working.   Cough- recurrent problem, unchanged, non productive, SOB- unchanged, using Trelegy daily, budesonide daily, albuterol daily, 5 mg prednisone. Has to stop outdoor chores due to SOB, no limits on indoor activities.       9/9/20- states oxygen concentrator is not working, has turned up to 5 L with very little air coming out of tubing. Titration study completed. Order sent for CPAP.   Wanting to change from [facility] to different [employer] company.     SOB- states felt better the morning after CPAP titration, worse with exertion. No improvement on supplemental oxygen, improves with rest.   Associated with chest tightness that improves with prednisone.   Using budesonide daily with only slight improvement in SOB  Cough- recurrent problem, non productive, mild,       8/27/2020- had sleep study, positive results and waiting for Titration study.  No change in fatigue, SOB, daily, chronic complaint, using prednisone as needed on average 2-3 days weekly. Takes for Chest tightness.   Using nebulizer Budesonide 2 times daily  with minimal improvement.  No cough, wearing supplemental oxygen at night.     7/15/2020- shortness of breath worsening slowly with time, states harder to breath following pneumonia May 2020 tx in [facility]. SOB severe and hinders his activity level, wearing oxygen only at night 5L. States it does not help much.   Complaint fatigue, daytime drowsiness, sleeping time varies, feeling drowsy in mornings, sleeps alone.  Cough- recurrent problem, occasionally productive light yellow mucous cream colored, no nocturnal arousals, no chest tightness or wheeze.  I05 therapy did not improve breathing, not currently on daily prednisone, using nebulizer as needed 2-3x daily. Trelegy    4/15/2020- SOB- unchanged, daily, worse with exertion, limiting daily activities and slows him down, decreased Prednisone 20 mg once tp twice a week. Has supplemental oxygen at 5L at night.     December 20, 2019- received oxygen concentrator, states does not improve SOB during day, SOB- stable, daily problem, worse with exertion, improves with nebulized Albuterol. Increased Prednisone himself to 10 mg in am and 40 mg nightly every night for last 3 weeks.      September 20, 2019- SOB worsening, using albuterol nebulizer 1-2 x daily every day. Currently on Prednisone 10 mg daily, with 20mg taper when needed. States having to breath out of mouth to exhale. Stopped Dupixent due to lack of benefit.     June 20, 2019- Dupixent injections started Feb 2019, pt states no benefit, Breathing has worsened. Exercise tolerance has decreased. Currently taking 10 mg prednisone daily, taken prednisone 20 mg taper 5 x in 3 months.    03/20/2019-Breathing worsened in past month, on dupixent since January, Currently on Trelegy 1 puff daily and prednisone 10 mg daily every day.   No fever, no body aches, no pain, no wheeze, not able to do activities with out shortness of breath. Occasional non productive cough.     12/20/18- Breathing worsened, SOB with any  exertion, not taking advair due to cost.  On long term prednisone therapy for uncontrolled severe persistent asthma. Eosinophil count artificially low due steroid use.    11/9/18- ER at [place] for chest pain, chest tightness, elevated pulse rate of 130, Discharged  Dx: COPD exacerbation, treated with albuterol nebulizer. States Advair cost to high, $183 month, States recently retired.   Using Duo neb 2x daily, 2-3 albuterol inhaler daily.    9/20/18- pt presents in clinic alone, states taking prednisone 10 mg daily and 20 mg daily twice a week when SOB, has taken 40 mg a day once last week. Uses rescue inhaler most days after climbing stairs at work. Has trouble sleeping. Discussed fasenra and pharmacy unable to contact patient. States he does not carry phone at work during day, does not answer numbers he does not know, and has no voicemail set up at moment. States using Advair Diskus daily. Cough every few days, non productive. Worse at night.      July 5, 2018- on prednisone 5/d with 20/d up to 3 weekly.  Was in hospital December.  Breathing worse, saw asbestos  and advised 2nd opinion, to see Dr Rodriguez soon.  No fasenra as pharmacy unable to contact pt. Pt ues prednisone 5/d and not using trelegy- has one puff left on 14 day sample given in April and relates trelegy not seems to help.  April 5 - increase prednisone from 5/d to 20/d since last yr - breathing still poor.  Rest good - but not better on prednisone 20/d.  No big improvement on incruse. Sinus better on flonase.     12/28/2017 pt very active, bikes to work with tool boxes with no problem. Had had hospital stays every 6 months til started prednisone continiously  5 yrs ago.  Pt recently flared with recent NSR stay. Pt had pft with fev1 50% at 1.53 liter 12/15/2017.  Very mahmood still.  On breo and singulair.  Use spiriva in past.     From consult last month:Plan:   Pt has eosinophillic asthma and copd.  Pt should respond to singulair.  Higher  dose steroid action plan needed.  He would likely do very well with il5 rx.     outpt soon on prednisone 60/d x 7 with taper, going to breo 200, and singulair would be good with current rx.  Would not strop prednisone.  outpt f/u recommended. I do no see asbestos complications.  History of Present Illness:  Cc is sob x 3 days.   Pt worked refinery as .  Stopped smoking yrs ago. FH + asthma with no prior h/o asthma.  Has had cough and wheezes and sob going back prior to [place].  Pt noted needed freq steroids so was kept on prednisone 5 mg daily with need to increase dose ever 2-3 months because of increase sob.  Pt uses breo 100 and xopenex works better than ventolin.  Pt has chr nasal drainage but no bad infections.  abx not used.  He denies spiriva helped in past and denies using daliresp.    Pt rides bike to work - bike weighs 300 lbs with tools for trade.  Pt very active and still works - lives with daughter.    Reviewed note    HPI:      8/21/2018 - Pt with known COPD, asthma (sees Dr Eddy) here for second opinion regarding asbestos lung disease.  Started working on [employer] in 1968.  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  He started working as a  in 1987 until present time and reports that he has had asbestos exposure.  He denies working directly with asbestos but did work in the vicinity.  He reports that Dr Eddy thinks he has asbestos issues.  Has had PFT but doesn't remember when.  He has chronic SOB, SHOEMAKER (being addressed by Dr Eddy)  He only uses prn inhalers.  Has some chronic cough and wheezing.  Feels that SOB is worse but he rides bike around at work (weights about 200#).        The chief compliant  problem is stable      PFSH:  Past Medical History:   Diagnosis Date    COPD (chronic obstructive pulmonary disease)     Hyperlipidemia      Hypertension     Obstructive sleep apnea          Past Surgical History:   Procedure Laterality Date    INGUINAL HERNIA REPAIR Left     SHOULDER ARTHROSCOPY Right     WRIST SURGERY Left      Social History     Tobacco Use    Smoking status: Former Smoker     Packs/day: 2.00     Years: 40.00     Pack years: 80.00     Quit date: 3/24/2009     Years since quittin.6    Smokeless tobacco: Never Used   Substance Use Topics    Alcohol use: Yes     Comment: 6-12 beers/night; some days none.    Drug use: No     Family History   Problem Relation Age of Onset    Cancer Mother     Asthma Mother     Heart disease Father     COPD Father     Cancer Father     Cancer Brother         colon cancer    No Known Problems Brother     No Known Problems Brother      Review of patient's allergies indicates:  No Known Allergies  I have reviewed past medical, family, and social history. I have reviewed previous nurse notes.    Performance Status:The patient's activity level is household activities.         Review of Systems   Constitutional: Negative for activity change, appetite change, chills, diaphoresis, fever and unexpected weight change. Positive for fatigue  HENT: Negative for dental problem, postnasal drip, rhinorrhea, sinus pain, sneezing, sore throat, trouble swallowing and voice change.  Respiratory: Negative for wheezing and stridor.  Positive for shortness of breath, apnea, chest tightness, cough  Cardiovascular: Negative for chest pain, palpitations and leg swelling.   Musculoskeletal: Negative for gait problem, myalgias and neck pain.  Skin: Negative for color change and pallor.   Allergic/Immunologic: Negative for environmental allergies and food allergies.   Neurological: Negative for dizziness, speech difficulty, weakness, light-headedness, numbness. Positive for headaches.   Hematological: Negative for adenopathy. Does not bruise/bleed easily.   Psychiatric/Behavioral: Negative for dysphoric mood. The  "patient is not nervous/anxious.  Positive for sleep disturbance       Exam:Comprehensive exam done. /68 (BP Location: Left arm, Patient Position: Sitting, BP Method: Medium (Automatic))   Pulse 67   Ht 5' 7" (1.702 m)   Wt 86.9 kg (191 lb 9.3 oz)   SpO2 96% Comment: at room air at rest  BMI 30.01 kg/m²   Exam included Vitals as listed  Constitutional: He is oriented to person, place, and time. He appears well-developed. No distress.   Nose: Nose normal.   Mouth/Throat: Uvula is midline, oropharynx is clear and moist and mucous membranes are normal. No dental caries. No oropharyngeal exudate, posterior oropharyngeal edema, posterior oropharyngeal erythema or tonsillar abscesses. Mallapatti (M) score II  Eyes: Pupils are equal, round, and reactive to light.   Neck: No JVD present. No thyromegaly present.   Cardiovascular: Normal rate, regular rhythm and normal heart sounds. Exam reveals no gallop and no friction rub.   No murmur heard.  Pulmonary/Chest: Effort normal and breath sounds diminished bilaterally, Clear. No accessory muscle usage or stridor. No apnea and no tachypnea. No respiratory distress, wheezes, rhonchi, rales, or tenderness.   Abdominal: Soft. Distended, He exhibits no mass. There is no tenderness. No hepatosplenomegaly, hernias and normoactive bowel sounds  Musculoskeletal: Normal range of motion. exhibits no edema.   Lymphadenopathy:     He has no cervical adenopathy.   Neurological:  alert and oriented to person, place, and time. not disoriented.   Skin: Skin is warm and dry. Capillary refill takes less 2 sec. No cyanosis or erythema. No pallor. Nails show clubbing.   Psychiatric: normal mood and affect. behavior is normal. Judgment and thought content normal.         Radiographs (ct chest and cxr) reviewed: results reviewed by direct vision  CTA Chest Non-Coronary 05/04/2020   Limited CT pulmonary angiogram without evidence for definite filling defects within the adequately opacified " "portions of the pulmonary arteries.  Airspace consolidation in the right lung base and to a lesser extent in the left lung base may represent areas of pneumonia.  Thickening of the wall of the mid to distal esophagus.  This could be further assessed with upper endoscopy or an esophagram to evaluate the clinical significance of this finding.  Mildly prominent lymph nodes in the subcarinal region.  Diffuse fatty change throughout the liver.  Emphysematous changes in the lungs bilaterally.    X-Ray Chest AP Portable 05/04/2020   The heart size is within normal limits.  There are calcifications in the aortic arch.  There are linear foci of atelectasis or parenchymal scarring in the lung bases bilaterally.  There are no pleural effusions.  The osseous structures are intact.    CT Chest Without Contrast 12/20/2019   Emphysema.  Atherosclerotic calcification of the aorta, origins of the right and left subclavian arteries and coronary arteries.    CT Chest Without Contrast 07/09/2019  Viewed by direct vision, poor quality images 74 images total not a sufficient study.  The lungs are hyperexpanded with bullous emphysematous changes seen in the upper lobes.  The lower lobes demonstrate prominent interlobar septa.  There are few patchy" bud in tree "appearance seen in the left lower lobe.  There are no pulmonary nodules or suspicious masses.  A small bilateral pleural effusion is present which could be physiologic.    The heart is normal in size however demonstrates coronary artery calcifications.  The tracheobronchial airway is slightly dilated.  There is shotty lymph nodes noted of the mediastinum.  Note is made of calcifications of the thoracic aorta.     XR CHEST 1 VIEW 03/20/2019   Negative chest x-ray     2D ECHO WITH COLOR FLOW DOPPLER 12/15/2017   3 - Normal left ventricular systolic function (EF 65-70%).     4 - Impaired LV relaxation, normal LAP (grade 1 diastolic dysfunction).       Labs reviewed       Lab Results "   Component Value Date    WBC 7.50 05/06/2020    RBC 3.83 (L) 05/06/2020    HGB 12.0 (L) 05/06/2020    HCT 35.0 (L) 05/06/2020    MCV 91 05/06/2020    MCH 31.3 (H) 05/06/2020    MCHC 34.3 05/06/2020    RDW 14.4 05/06/2020     05/06/2020    MPV 7.9 (L) 05/06/2020    GRAN 6.0 05/06/2020    GRAN 79.3 (H) 05/06/2020    LYMPH 0.7 (L) 05/06/2020    LYMPH 9.6 (L) 05/06/2020    MONO 0.6 05/06/2020    MONO 8.2 05/06/2020    EOS 0.2 05/06/2020    BASO 0.10 05/06/2020    EOSINOPHIL 2.1 05/06/2020    BASOPHIL 0.8 05/06/2020     Results for CRISTINA MADELEINE EVANGELISTA (MRN [id number]) as of 12/20/2018 14:08   Ref. Range 12/11/2017 05:14   Eos # Latest Ref Range: 0.0 - 0.5 K/uL 0.5       PFT results reviewed  Pulmonary Functions Testing Results:  PFT results reviewed fev1 50 % at 1.53 with no bd response 12/15/2017      Spirometry bronchodilator, lung volume by gas dilution, diffusion capacity measured July 9, 2019.  The FEV1 to FVC ratio was only 54%, this indicates airflow obstruction.  The FEV1 measured 59% predicted at 1.75 L.  The of 5% improvement in the FEV1   following bronchodilator was not statistically significant, 12% seem to for statistical significance.  Total lung capacity was normal.  Diffusion was low, uncorrected for anemia present, at 61%.     There is  severe airflow obstruction, no bronchodilator response, no restriction, and loss of diffusion measured.  Clinical correlation recommended.        6 min walk study was done December 20, 2018.  Baseline room air saturation is 94%.  With ambulation O2 sat did fall 90% but no lower.  Patient could walk 425 m.  Patient walked 82% of the reference  distance       EPWORTH SLEEPINESS SCALE SCORE 16 on 7/15/2020    Sleep study 8/4/2020= AHI 23.4    Compliance report 9/15- 10/14   Percent of days with usage >= 4 hours 83.3%  Compliance 10/10-11/8  > 4 hours 56%    Plan:  Clinical impression is resonably certain and repeated evaluation prn +/- follow up will be needed as  below.    Brad was seen today for follow-up and copd.    Diagnoses and all orders for this visit:    Pulmonary emphysema, unspecified emphysema type  -     predniSONE (DELTASONE) 10 MG tablet; Take 1 tablet (10 mg total) by mouth once daily.  -     predniSONE (DELTASONE) 20 MG tablet; TAKE 1 TABLET BY MOUTH 2-3 TIMES WEEKLY AS NEEDED    Current chronic use of systemic steroids  -     predniSONE (DELTASONE) 10 MG tablet; Take 1 tablet (10 mg total) by mouth once daily.  -     predniSONE (DELTASONE) 20 MG tablet; TAKE 1 TABLET BY MOUTH 2-3 TIMES WEEKLY AS NEEDED    Steroid-dependent chronic obstructive pulmonary disease  -     predniSONE (DELTASONE) 10 MG tablet; Take 1 tablet (10 mg total) by mouth once daily.  -     predniSONE (DELTASONE) 20 MG tablet; TAKE 1 TABLET BY MOUTH 2-3 TIMES WEEKLY AS NEEDED    Obstructive sleep apnea syndrome        - continue CPAP nightly      Follow up in about 3 months (around 2/9/2021), or if symptoms worsen or fail to improve.    Discussed with patient above for education the following:      Patient Instructions   Waiting on compliance report will repeat request for different mask    Continue COPD medication regiment    increasing prednisone to 10 mg daily    Use CPAP nightly

## 2020-11-11 ENCOUNTER — TELEPHONE (OUTPATIENT)
Dept: PULMONOLOGY | Facility: CLINIC | Age: 70
End: 2020-11-11

## 2020-11-25 DIAGNOSIS — J43.9 PULMONARY EMPHYSEMA, UNSPECIFIED EMPHYSEMA TYPE: ICD-10-CM

## 2020-11-25 DIAGNOSIS — Z92.241 STEROID-DEPENDENT CHRONIC OBSTRUCTIVE PULMONARY DISEASE: ICD-10-CM

## 2020-11-25 DIAGNOSIS — J44.9 STEROID-DEPENDENT CHRONIC OBSTRUCTIVE PULMONARY DISEASE: ICD-10-CM

## 2020-11-25 RX ORDER — ALBUTEROL SULFATE 90 UG/1
2 AEROSOL, METERED RESPIRATORY (INHALATION) EVERY 4 HOURS PRN
Qty: 18 G | Refills: 11 | Status: SHIPPED | OUTPATIENT
Start: 2020-11-25 | End: 2021-02-09 | Stop reason: SDUPTHER

## 2021-02-09 ENCOUNTER — HOSPITAL ENCOUNTER (OUTPATIENT)
Dept: PULMONOLOGY | Facility: HOSPITAL | Age: 71
Discharge: HOME | End: 2021-02-09
Attending: NURSE PRACTITIONER
Payer: MEDICARE

## 2021-02-09 ENCOUNTER — OFFICE VISIT (OUTPATIENT)
Dept: PULMONOLOGY | Facility: CLINIC | Age: 71
End: 2021-02-09
Payer: MEDICARE

## 2021-02-09 VITALS
DIASTOLIC BLOOD PRESSURE: 74 MMHG | BODY MASS INDEX: 28.41 KG/M2 | SYSTOLIC BLOOD PRESSURE: 145 MMHG | WEIGHT: 181 LBS | HEART RATE: 63 BPM | OXYGEN SATURATION: 96 % | HEIGHT: 67 IN

## 2021-02-09 DIAGNOSIS — G47.33 OBSTRUCTIVE SLEEP APNEA SYNDROME: ICD-10-CM

## 2021-02-09 DIAGNOSIS — J96.11 CHRONIC RESPIRATORY FAILURE WITH HYPOXIA: ICD-10-CM

## 2021-02-09 DIAGNOSIS — Z79.52 CURRENT CHRONIC USE OF SYSTEMIC STEROIDS: ICD-10-CM

## 2021-02-09 DIAGNOSIS — J44.9 CHRONIC OBSTRUCTIVE PULMONARY DISEASE, UNSPECIFIED COPD TYPE: ICD-10-CM

## 2021-02-09 DIAGNOSIS — J44.9 STEROID-DEPENDENT CHRONIC OBSTRUCTIVE PULMONARY DISEASE: ICD-10-CM

## 2021-02-09 DIAGNOSIS — J43.9 PULMONARY EMPHYSEMA, UNSPECIFIED EMPHYSEMA TYPE: Primary | ICD-10-CM

## 2021-02-09 DIAGNOSIS — Z92.241 STEROID-DEPENDENT CHRONIC OBSTRUCTIVE PULMONARY DISEASE: ICD-10-CM

## 2021-02-09 DIAGNOSIS — J43.9 PULMONARY EMPHYSEMA, UNSPECIFIED EMPHYSEMA TYPE: ICD-10-CM

## 2021-02-09 LAB — BR6MWT: NORMAL

## 2021-02-09 PROCEDURE — 94618 PULMONARY STRESS TESTING: CPT | Mod: 26,,, | Performed by: INTERNAL MEDICINE

## 2021-02-09 PROCEDURE — 94618 PULMONARY STRESS TESTING: CPT

## 2021-02-09 PROCEDURE — 1159F PR MEDICATION LIST DOCUMENTED IN MEDICAL RECORD: ICD-10-PCS | Mod: S$GLB,,, | Performed by: NURSE PRACTITIONER

## 2021-02-09 PROCEDURE — 1101F PT FALLS ASSESS-DOCD LE1/YR: CPT | Mod: CPTII,S$GLB,, | Performed by: NURSE PRACTITIONER

## 2021-02-09 PROCEDURE — 1101F PR PT FALLS ASSESS DOC 0-1 FALLS W/OUT INJ PAST YR: ICD-10-PCS | Mod: CPTII,S$GLB,, | Performed by: NURSE PRACTITIONER

## 2021-02-09 PROCEDURE — 99999 PR PBB SHADOW E&M-EST. PATIENT-LVL IV: ICD-10-PCS | Mod: PBBFAC,,, | Performed by: NURSE PRACTITIONER

## 2021-02-09 PROCEDURE — 99214 OFFICE O/P EST MOD 30 MIN: CPT | Mod: S$GLB,,, | Performed by: NURSE PRACTITIONER

## 2021-02-09 PROCEDURE — 3288F FALL RISK ASSESSMENT DOCD: CPT | Mod: CPTII,S$GLB,, | Performed by: NURSE PRACTITIONER

## 2021-02-09 PROCEDURE — 1126F PR PAIN SEVERITY QUANTIFIED, NO PAIN PRESENT: ICD-10-PCS | Mod: S$GLB,,, | Performed by: NURSE PRACTITIONER

## 2021-02-09 PROCEDURE — 94618 PULMONARY STRESS TESTING: ICD-10-PCS | Mod: 26,,, | Performed by: INTERNAL MEDICINE

## 2021-02-09 PROCEDURE — 99214 PR OFFICE/OUTPT VISIT, EST, LEVL IV, 30-39 MIN: ICD-10-PCS | Mod: S$GLB,,, | Performed by: NURSE PRACTITIONER

## 2021-02-09 PROCEDURE — 3288F PR FALLS RISK ASSESSMENT DOCUMENTED: ICD-10-PCS | Mod: CPTII,S$GLB,, | Performed by: NURSE PRACTITIONER

## 2021-02-09 PROCEDURE — 99999 PR PBB SHADOW E&M-EST. PATIENT-LVL IV: CPT | Mod: PBBFAC,,, | Performed by: NURSE PRACTITIONER

## 2021-02-09 PROCEDURE — 1126F AMNT PAIN NOTED NONE PRSNT: CPT | Mod: S$GLB,,, | Performed by: NURSE PRACTITIONER

## 2021-02-09 PROCEDURE — 1159F MED LIST DOCD IN RCRD: CPT | Mod: S$GLB,,, | Performed by: NURSE PRACTITIONER

## 2021-02-09 PROCEDURE — 3008F PR BODY MASS INDEX (BMI) DOCUMENTED: ICD-10-PCS | Mod: CPTII,S$GLB,, | Performed by: NURSE PRACTITIONER

## 2021-02-09 PROCEDURE — 3008F BODY MASS INDEX DOCD: CPT | Mod: CPTII,S$GLB,, | Performed by: NURSE PRACTITIONER

## 2021-02-09 RX ORDER — IPRATROPIUM BROMIDE AND ALBUTEROL SULFATE 2.5; .5 MG/3ML; MG/3ML
3 SOLUTION RESPIRATORY (INHALATION) EVERY 6 HOURS PRN
Qty: 360 ML | Refills: 5 | Status: SHIPPED | OUTPATIENT
Start: 2021-02-09 | End: 2021-09-23 | Stop reason: SDUPTHER

## 2021-02-09 RX ORDER — BUDESONIDE 0.5 MG/2ML
0.5 INHALANT ORAL 2 TIMES DAILY PRN
Qty: 120 ML | Refills: 1 | Status: SHIPPED | OUTPATIENT
Start: 2021-02-09 | End: 2022-03-27 | Stop reason: SDUPTHER

## 2021-02-09 RX ORDER — ALBUTEROL SULFATE 90 UG/1
2 AEROSOL, METERED RESPIRATORY (INHALATION) EVERY 4 HOURS PRN
Qty: 18 G | Refills: 11 | Status: SHIPPED | OUTPATIENT
Start: 2021-02-09 | End: 2021-09-23 | Stop reason: SDUPTHER

## 2021-04-15 DIAGNOSIS — Z79.52 CURRENT CHRONIC USE OF SYSTEMIC STEROIDS: ICD-10-CM

## 2021-04-15 DIAGNOSIS — Z92.241 STEROID-DEPENDENT CHRONIC OBSTRUCTIVE PULMONARY DISEASE: ICD-10-CM

## 2021-04-15 DIAGNOSIS — J44.9 STEROID-DEPENDENT CHRONIC OBSTRUCTIVE PULMONARY DISEASE: ICD-10-CM

## 2021-04-15 DIAGNOSIS — J43.9 PULMONARY EMPHYSEMA, UNSPECIFIED EMPHYSEMA TYPE: ICD-10-CM

## 2021-04-15 RX ORDER — LOSARTAN POTASSIUM 100 MG/1
100 TABLET ORAL DAILY
Qty: 90 TABLET | Refills: 1 | Status: CANCELLED | OUTPATIENT
Start: 2021-04-15

## 2021-04-15 RX ORDER — PREDNISONE 10 MG/1
10 TABLET ORAL DAILY
Qty: 90 TABLET | Refills: 1 | Status: SHIPPED | OUTPATIENT
Start: 2021-04-15 | End: 2021-09-23 | Stop reason: SDUPTHER

## 2021-04-15 RX ORDER — PREDNISONE 20 MG/1
TABLET ORAL
Qty: 21 TABLET | Refills: 2 | Status: SHIPPED | OUTPATIENT
Start: 2021-04-15 | End: 2021-09-23 | Stop reason: SDUPTHER

## 2021-08-09 ENCOUNTER — OFFICE VISIT (OUTPATIENT)
Dept: PULMONOLOGY | Facility: CLINIC | Age: 71
End: 2021-08-09
Payer: MEDICARE

## 2021-08-09 VITALS
HEART RATE: 73 BPM | TEMPERATURE: 98 F | WEIGHT: 183.63 LBS | SYSTOLIC BLOOD PRESSURE: 162 MMHG | DIASTOLIC BLOOD PRESSURE: 71 MMHG | OXYGEN SATURATION: 95 % | BODY MASS INDEX: 28.82 KG/M2 | HEIGHT: 67 IN

## 2021-08-09 DIAGNOSIS — J44.9 CHRONIC OBSTRUCTIVE PULMONARY DISEASE, UNSPECIFIED COPD TYPE: Primary | ICD-10-CM

## 2021-08-09 PROCEDURE — 3008F BODY MASS INDEX DOCD: CPT | Mod: CPTII,S$GLB,, | Performed by: NURSE PRACTITIONER

## 2021-08-09 PROCEDURE — 99213 OFFICE O/P EST LOW 20 MIN: CPT | Mod: S$GLB,,, | Performed by: NURSE PRACTITIONER

## 2021-08-09 PROCEDURE — 3077F PR MOST RECENT SYSTOLIC BLOOD PRESSURE >= 140 MM HG: ICD-10-PCS | Mod: CPTII,S$GLB,, | Performed by: NURSE PRACTITIONER

## 2021-08-09 PROCEDURE — 1100F PR PT FALLS ASSESS DOC 2+ FALLS/FALL W/INJURY/YR: ICD-10-PCS | Mod: CPTII,S$GLB,, | Performed by: NURSE PRACTITIONER

## 2021-08-09 PROCEDURE — 1159F PR MEDICATION LIST DOCUMENTED IN MEDICAL RECORD: ICD-10-PCS | Mod: CPTII,S$GLB,, | Performed by: NURSE PRACTITIONER

## 2021-08-09 PROCEDURE — 99999 PR PBB SHADOW E&M-EST. PATIENT-LVL III: CPT | Mod: PBBFAC,,, | Performed by: NURSE PRACTITIONER

## 2021-08-09 PROCEDURE — 3078F PR MOST RECENT DIASTOLIC BLOOD PRESSURE < 80 MM HG: ICD-10-PCS | Mod: CPTII,S$GLB,, | Performed by: NURSE PRACTITIONER

## 2021-08-09 PROCEDURE — 99999 PR PBB SHADOW E&M-EST. PATIENT-LVL III: ICD-10-PCS | Mod: PBBFAC,,, | Performed by: NURSE PRACTITIONER

## 2021-08-09 PROCEDURE — 3008F PR BODY MASS INDEX (BMI) DOCUMENTED: ICD-10-PCS | Mod: CPTII,S$GLB,, | Performed by: NURSE PRACTITIONER

## 2021-08-09 PROCEDURE — 1100F PTFALLS ASSESS-DOCD GE2>/YR: CPT | Mod: CPTII,S$GLB,, | Performed by: NURSE PRACTITIONER

## 2021-08-09 PROCEDURE — 1126F PR PAIN SEVERITY QUANTIFIED, NO PAIN PRESENT: ICD-10-PCS | Mod: CPTII,S$GLB,, | Performed by: NURSE PRACTITIONER

## 2021-08-09 PROCEDURE — 3078F DIAST BP <80 MM HG: CPT | Mod: CPTII,S$GLB,, | Performed by: NURSE PRACTITIONER

## 2021-08-09 PROCEDURE — 3077F SYST BP >= 140 MM HG: CPT | Mod: CPTII,S$GLB,, | Performed by: NURSE PRACTITIONER

## 2021-08-09 PROCEDURE — 3288F PR FALLS RISK ASSESSMENT DOCUMENTED: ICD-10-PCS | Mod: CPTII,S$GLB,, | Performed by: NURSE PRACTITIONER

## 2021-08-09 PROCEDURE — 1159F MED LIST DOCD IN RCRD: CPT | Mod: CPTII,S$GLB,, | Performed by: NURSE PRACTITIONER

## 2021-08-09 PROCEDURE — 3288F FALL RISK ASSESSMENT DOCD: CPT | Mod: CPTII,S$GLB,, | Performed by: NURSE PRACTITIONER

## 2021-08-09 PROCEDURE — 1126F AMNT PAIN NOTED NONE PRSNT: CPT | Mod: CPTII,S$GLB,, | Performed by: NURSE PRACTITIONER

## 2021-08-09 PROCEDURE — 99213 PR OFFICE/OUTPT VISIT, EST, LEVL III, 20-29 MIN: ICD-10-PCS | Mod: S$GLB,,, | Performed by: NURSE PRACTITIONER

## 2021-08-09 RX ORDER — HYDROCODONE BITARTRATE AND ACETAMINOPHEN 5; 325 MG/1; MG/1
TABLET ORAL 3 TIMES DAILY PRN
COMMUNITY
Start: 2021-07-28 | End: 2023-05-02

## 2021-09-23 DIAGNOSIS — J96.11 CHRONIC RESPIRATORY FAILURE WITH HYPOXIA: ICD-10-CM

## 2021-09-23 DIAGNOSIS — J44.9 STEROID-DEPENDENT CHRONIC OBSTRUCTIVE PULMONARY DISEASE: ICD-10-CM

## 2021-09-23 DIAGNOSIS — J43.9 PULMONARY EMPHYSEMA, UNSPECIFIED EMPHYSEMA TYPE: ICD-10-CM

## 2021-09-23 DIAGNOSIS — Z79.52 CURRENT CHRONIC USE OF SYSTEMIC STEROIDS: ICD-10-CM

## 2021-09-23 DIAGNOSIS — Z92.241 STEROID-DEPENDENT CHRONIC OBSTRUCTIVE PULMONARY DISEASE: ICD-10-CM

## 2021-09-23 RX ORDER — PREDNISONE 10 MG/1
10 TABLET ORAL DAILY
Qty: 90 TABLET | Refills: 1 | Status: SHIPPED | OUTPATIENT
Start: 2021-09-23 | End: 2021-10-28 | Stop reason: SDUPTHER

## 2021-09-23 RX ORDER — PREDNISONE 20 MG/1
TABLET ORAL
Qty: 21 TABLET | Refills: 2 | Status: SHIPPED | OUTPATIENT
Start: 2021-09-23 | End: 2021-10-28 | Stop reason: SDUPTHER

## 2021-09-23 RX ORDER — IPRATROPIUM BROMIDE AND ALBUTEROL SULFATE 2.5; .5 MG/3ML; MG/3ML
3 SOLUTION RESPIRATORY (INHALATION) EVERY 6 HOURS PRN
Qty: 360 ML | Refills: 5 | Status: SHIPPED | OUTPATIENT
Start: 2021-09-23 | End: 2021-11-08 | Stop reason: SDUPTHER

## 2021-09-23 RX ORDER — ALBUTEROL SULFATE 90 UG/1
2 AEROSOL, METERED RESPIRATORY (INHALATION) EVERY 4 HOURS PRN
Qty: 18 G | Refills: 11 | Status: SHIPPED | OUTPATIENT
Start: 2021-09-23 | End: 2021-11-08 | Stop reason: SDUPTHER

## 2021-10-28 DIAGNOSIS — J43.9 PULMONARY EMPHYSEMA, UNSPECIFIED EMPHYSEMA TYPE: ICD-10-CM

## 2021-10-28 DIAGNOSIS — Z79.52 CURRENT CHRONIC USE OF SYSTEMIC STEROIDS: ICD-10-CM

## 2021-10-28 DIAGNOSIS — J44.9 STEROID-DEPENDENT CHRONIC OBSTRUCTIVE PULMONARY DISEASE: ICD-10-CM

## 2021-10-28 DIAGNOSIS — Z92.241 STEROID-DEPENDENT CHRONIC OBSTRUCTIVE PULMONARY DISEASE: ICD-10-CM

## 2021-10-28 RX ORDER — PREDNISONE 20 MG/1
TABLET ORAL
Qty: 21 TABLET | Refills: 2 | Status: SHIPPED | OUTPATIENT
Start: 2021-10-28 | End: 2022-10-27 | Stop reason: SDUPTHER

## 2021-10-28 RX ORDER — PREDNISONE 10 MG/1
10 TABLET ORAL DAILY
Qty: 90 TABLET | Refills: 1 | Status: SHIPPED | OUTPATIENT
Start: 2021-10-28 | End: 2022-04-25 | Stop reason: SDUPTHER

## 2021-11-08 ENCOUNTER — IMMUNIZATION (OUTPATIENT)
Dept: PRIMARY CARE CLINIC | Facility: CLINIC | Age: 71
End: 2021-11-08
Payer: MEDICARE

## 2021-11-08 DIAGNOSIS — J44.9 STEROID-DEPENDENT CHRONIC OBSTRUCTIVE PULMONARY DISEASE: ICD-10-CM

## 2021-11-08 DIAGNOSIS — J43.9 PULMONARY EMPHYSEMA, UNSPECIFIED EMPHYSEMA TYPE: ICD-10-CM

## 2021-11-08 DIAGNOSIS — Z23 NEED FOR VACCINATION: Primary | ICD-10-CM

## 2021-11-08 DIAGNOSIS — J96.11 CHRONIC RESPIRATORY FAILURE WITH HYPOXIA: ICD-10-CM

## 2021-11-08 DIAGNOSIS — Z92.241 STEROID-DEPENDENT CHRONIC OBSTRUCTIVE PULMONARY DISEASE: ICD-10-CM

## 2021-11-08 PROCEDURE — 91301 COVID-19, MRNA, LNP-S, PF, 100 MCG/0.5 ML DOSE VACCINE: CPT | Mod: S$GLB,,, | Performed by: FAMILY MEDICINE

## 2021-11-08 PROCEDURE — 91301 COVID-19, MRNA, LNP-S, PF, 100 MCG/0.5 ML DOSE VACCINE: ICD-10-PCS | Mod: S$GLB,,, | Performed by: FAMILY MEDICINE

## 2021-11-08 RX ORDER — IPRATROPIUM BROMIDE AND ALBUTEROL SULFATE 2.5; .5 MG/3ML; MG/3ML
3 SOLUTION RESPIRATORY (INHALATION) EVERY 6 HOURS PRN
Qty: 360 ML | Refills: 5 | Status: SHIPPED | OUTPATIENT
Start: 2021-11-08 | End: 2024-04-02 | Stop reason: SDUPTHER

## 2021-11-08 RX ORDER — ALBUTEROL SULFATE 90 UG/1
2 AEROSOL, METERED RESPIRATORY (INHALATION) EVERY 4 HOURS PRN
Qty: 18 G | Refills: 11 | Status: SHIPPED | OUTPATIENT
Start: 2021-11-08 | End: 2023-03-07 | Stop reason: SDUPTHER

## 2022-02-09 ENCOUNTER — OFFICE VISIT (OUTPATIENT)
Dept: PULMONOLOGY | Facility: CLINIC | Age: 72
End: 2022-02-09
Payer: MEDICARE

## 2022-02-09 ENCOUNTER — TELEPHONE (OUTPATIENT)
Dept: PULMONOLOGY | Facility: CLINIC | Age: 72
End: 2022-02-09

## 2022-02-09 ENCOUNTER — HOSPITAL ENCOUNTER (OUTPATIENT)
Dept: PULMONOLOGY | Facility: HOSPITAL | Age: 72
Discharge: HOME | End: 2022-02-09
Attending: NURSE PRACTITIONER
Payer: MEDICARE

## 2022-02-09 ENCOUNTER — PATIENT MESSAGE (OUTPATIENT)
Dept: PULMONOLOGY | Facility: CLINIC | Age: 72
End: 2022-02-09

## 2022-02-09 VITALS
DIASTOLIC BLOOD PRESSURE: 73 MMHG | HEIGHT: 67 IN | SYSTOLIC BLOOD PRESSURE: 148 MMHG | OXYGEN SATURATION: 96 % | BODY MASS INDEX: 29.77 KG/M2 | WEIGHT: 189.69 LBS | HEART RATE: 61 BPM

## 2022-02-09 DIAGNOSIS — R06.02 SHORTNESS OF BREATH: Primary | ICD-10-CM

## 2022-02-09 DIAGNOSIS — J43.1 PANLOBULAR EMPHYSEMA: ICD-10-CM

## 2022-02-09 DIAGNOSIS — J44.9 CHRONIC OBSTRUCTIVE PULMONARY DISEASE, UNSPECIFIED COPD TYPE: ICD-10-CM

## 2022-02-09 DIAGNOSIS — R06.02 SHORTNESS OF BREATH: ICD-10-CM

## 2022-02-09 DIAGNOSIS — J47.9 BRONCHIECTASIS WITHOUT COMPLICATION: ICD-10-CM

## 2022-02-09 DIAGNOSIS — J44.9 CHRONIC OBSTRUCTIVE PULMONARY DISEASE, UNSPECIFIED COPD TYPE: Primary | ICD-10-CM

## 2022-02-09 DIAGNOSIS — G47.33 OBSTRUCTIVE SLEEP APNEA SYNDROME: ICD-10-CM

## 2022-02-09 PROCEDURE — 99214 PR OFFICE/OUTPT VISIT, EST, LEVL IV, 30-39 MIN: ICD-10-PCS | Mod: 25,S$GLB,, | Performed by: NURSE PRACTITIONER

## 2022-02-09 PROCEDURE — 3008F PR BODY MASS INDEX (BMI) DOCUMENTED: ICD-10-PCS | Mod: CPTII,S$GLB,, | Performed by: NURSE PRACTITIONER

## 2022-02-09 PROCEDURE — 94727 GAS DIL/WSHOT DETER LNG VOL: CPT | Mod: 26,,, | Performed by: INTERNAL MEDICINE

## 2022-02-09 PROCEDURE — 99999 PR PBB SHADOW E&M-EST. PATIENT-LVL V: ICD-10-PCS | Mod: PBBFAC,,, | Performed by: NURSE PRACTITIONER

## 2022-02-09 PROCEDURE — 94727 PR PULM FUNCTION TEST BY GAS: ICD-10-PCS | Mod: 26,,, | Performed by: INTERNAL MEDICINE

## 2022-02-09 PROCEDURE — 1126F PR PAIN SEVERITY QUANTIFIED, NO PAIN PRESENT: ICD-10-PCS | Mod: CPTII,S$GLB,, | Performed by: NURSE PRACTITIONER

## 2022-02-09 PROCEDURE — 94060 EVALUATION OF WHEEZING: CPT

## 2022-02-09 PROCEDURE — 99214 OFFICE O/P EST MOD 30 MIN: CPT | Mod: 25,S$GLB,, | Performed by: NURSE PRACTITIONER

## 2022-02-09 PROCEDURE — 94727 GAS DIL/WSHOT DETER LNG VOL: CPT

## 2022-02-09 PROCEDURE — 94729 DIFFUSING CAPACITY: CPT

## 2022-02-09 PROCEDURE — 3077F PR MOST RECENT SYSTOLIC BLOOD PRESSURE >= 140 MM HG: ICD-10-PCS | Mod: CPTII,S$GLB,, | Performed by: NURSE PRACTITIONER

## 2022-02-09 PROCEDURE — 99999 PR PBB SHADOW E&M-EST. PATIENT-LVL V: CPT | Mod: PBBFAC,,, | Performed by: NURSE PRACTITIONER

## 2022-02-09 PROCEDURE — 1126F AMNT PAIN NOTED NONE PRSNT: CPT | Mod: CPTII,S$GLB,, | Performed by: NURSE PRACTITIONER

## 2022-02-09 PROCEDURE — 3078F PR MOST RECENT DIASTOLIC BLOOD PRESSURE < 80 MM HG: ICD-10-PCS | Mod: CPTII,S$GLB,, | Performed by: NURSE PRACTITIONER

## 2022-02-09 PROCEDURE — 3288F PR FALLS RISK ASSESSMENT DOCUMENTED: ICD-10-PCS | Mod: CPTII,S$GLB,, | Performed by: NURSE PRACTITIONER

## 2022-02-09 PROCEDURE — 1159F PR MEDICATION LIST DOCUMENTED IN MEDICAL RECORD: ICD-10-PCS | Mod: CPTII,S$GLB,, | Performed by: NURSE PRACTITIONER

## 2022-02-09 PROCEDURE — 3288F FALL RISK ASSESSMENT DOCD: CPT | Mod: CPTII,S$GLB,, | Performed by: NURSE PRACTITIONER

## 2022-02-09 PROCEDURE — 94729 PR C02/MEMBANE DIFFUSE CAPACITY: ICD-10-PCS | Mod: 26,,, | Performed by: INTERNAL MEDICINE

## 2022-02-09 PROCEDURE — 1159F MED LIST DOCD IN RCRD: CPT | Mod: CPTII,S$GLB,, | Performed by: NURSE PRACTITIONER

## 2022-02-09 PROCEDURE — 3078F DIAST BP <80 MM HG: CPT | Mod: CPTII,S$GLB,, | Performed by: NURSE PRACTITIONER

## 2022-02-09 PROCEDURE — 94060 EVALUATION OF WHEEZING: CPT | Mod: 26,59,, | Performed by: INTERNAL MEDICINE

## 2022-02-09 PROCEDURE — 4010F ACE/ARB THERAPY RXD/TAKEN: CPT | Mod: CPTII,S$GLB,, | Performed by: NURSE PRACTITIONER

## 2022-02-09 PROCEDURE — 94060 PR EVAL OF BRONCHOSPASM: ICD-10-PCS | Mod: 26,59,, | Performed by: INTERNAL MEDICINE

## 2022-02-09 PROCEDURE — 94618 PULMONARY STRESS TESTING: CPT

## 2022-02-09 PROCEDURE — 3008F BODY MASS INDEX DOCD: CPT | Mod: CPTII,S$GLB,, | Performed by: NURSE PRACTITIONER

## 2022-02-09 PROCEDURE — 94729 DIFFUSING CAPACITY: CPT | Mod: 26,,, | Performed by: INTERNAL MEDICINE

## 2022-02-09 PROCEDURE — 4010F PR ACE/ARB THEARPY RXD/TAKEN: ICD-10-PCS | Mod: CPTII,S$GLB,, | Performed by: NURSE PRACTITIONER

## 2022-02-09 PROCEDURE — 1101F PR PT FALLS ASSESS DOC 0-1 FALLS W/OUT INJ PAST YR: ICD-10-PCS | Mod: CPTII,S$GLB,, | Performed by: NURSE PRACTITIONER

## 2022-02-09 PROCEDURE — 3077F SYST BP >= 140 MM HG: CPT | Mod: CPTII,S$GLB,, | Performed by: NURSE PRACTITIONER

## 2022-02-09 PROCEDURE — 94618 PULMONARY STRESS TESTING: ICD-10-PCS | Mod: 26,,, | Performed by: INTERNAL MEDICINE

## 2022-02-09 PROCEDURE — 1101F PT FALLS ASSESS-DOCD LE1/YR: CPT | Mod: CPTII,S$GLB,, | Performed by: NURSE PRACTITIONER

## 2022-02-09 PROCEDURE — 94618 PULMONARY STRESS TESTING: CPT | Mod: 26,,, | Performed by: INTERNAL MEDICINE

## 2022-02-09 RX ORDER — BUDESONIDE, GLYCOPYRROLATE, AND FORMOTEROL FUMARATE 160; 9; 4.8 UG/1; UG/1; UG/1
2 AEROSOL, METERED RESPIRATORY (INHALATION) 2 TIMES DAILY
Qty: 10.7 G | Refills: 11 | Status: SHIPPED | OUTPATIENT
Start: 2022-02-09 | End: 2023-03-07 | Stop reason: SDUPTHER

## 2022-02-09 NOTE — PROGRESS NOTES
2/9/2022    Brad Nowak  Office Note    Chief Complaint   Patient presents with    6m f/u    Shortness of Breath     [place]ors[place]        HPI:  2/9/22- received new CPAP following recall of original in past 2 weeks, wearing nightly with benefit; does not have supplemental oxygen.    SOB- worsening with time, difficult to do any exertion with out stopping to rest. Using albuterol inhaler 2x weekly with little benefit, severe complaint, worse in very cold weather. Difficult to leave home due to SOB.   Using nebulizer as needed. States nebulizer does not help with breathing.     No weightloss, chest tightness, wheeze or  night sweating, occasional cough non productive.       8/9/2021- fell at home fractured ribs 7/25/21. States pain is recurrent, worse with movement. Tx with pain medication, 10 on 0-10 scale with movement. Improves with pain medication with no current pain.  SOB- unchanged. Daily, worse after taking shower and cutting grass, improves with rest, able to do activities of daily living. On prednisone 10 mg daily. Taking 20 mg one day weekly. Associated with occasional non productive cough.  No chest tightness or wheezing. Has CPAP, not wearing due to CPAP causes dry mouth that is uncomfortable. States he breaths better with out it.     2/9/21- wearing CPAP nightly with some benefit. Did not receive the new mask as ordered. Mask is staying on all night. Waking up every night 1-2 x nightly around 1 or 2 pm.   SOB- unchanged, daily, can still perform tasks but has to move slowly, currently busy doing home repairs. Currently on Prednisone 10 mg daily with 20 mg once a week.   No cough, chest tightness, or wheeze. Using nebulizer 2x daily.        11/9/2020- states did not receive new mask from [facility], states when he wakes up mask is not correct on face and air is leaking.   Wearing CPAP nightly, states feels about the same.   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 On 5 mg prednisone daily. Using Albuterol 20 mg 2-3 x weekly.   Cough, chest tightness, wheeze- unchanged, daily complaints.     10/15/2020- received CPAP machine from [facility] on Sept 17th, states wearing every night between 9:30- 5 am. States his daytime drowsiness has worsened since starting CPAP, still trying to adjust to wearing CPAP full face mask. States not comfortable. Has air leaking from above nose.   Returned oxygen concentrator to [facility] [place] due to it not working.   Cough- recurrent problem, unchanged, non productive, SOB- unchanged, using Trelegy daily, budesonide daily, albuterol daily, 5 mg prednisone. Has to stop outdoor chores due to SOB, no limits on indoor activities.       9/9/20- states oxygen concentrator is not working, has turned up to 5 L with very little air coming out of tubing. Titration study completed. Order sent for CPAP.   Wanting to change from [facility] [place] to different DME company.     SOB- states felt better the morning after CPAP titration, worse with exertion. No improvement on supplemental oxygen, improves with rest.   Associated with chest tightness that improves with prednisone.   Using budesonide daily with only slight improvement in SOB  Cough- recurrent problem, non productive, mild,       8/27/2020- had sleep study, positive results and waiting for Titration study.  No change in fatigue, SOB, daily, chronic complaint, using prednisone as needed on average 2-3 days weekly. Takes for Chest tightness.   Using nebulizer Budesonide 2 times daily with minimal improvement.  No cough, wearing supplemental oxygen at night.     7/15/2020- shortness of breath worsening slowly with time, states harder to breath following pneumonia May 2020 tx in [facility]. SOB severe and hinders his activity level, wearing oxygen only at night 5L. States it does not help much.   Complaint fatigue, daytime drowsiness, sleeping time varies, feeling drowsy in mornings, sleeps  alone.  Cough- recurrent problem, occasionally productive light yellow mucous cream colored, no nocturnal arousals, no chest tightness or wheeze.  I05 therapy did not improve breathing, not currently on daily prednisone, using nebulizer as needed 2-3x daily. Trelegy    4/15/2020- SOB- unchanged, daily, worse with exertion, limiting daily activities and slows him down, decreased Prednisone 20 mg once tp twice a week. Has supplemental oxygen at 5L at night.     December 20, 2019- received oxygen concentrator, states does not improve SOB during day, SOB- stable, daily problem, worse with exertion, improves with nebulized Albuterol. Increased Prednisone himself to 10 mg in am and 40 mg nightly every night for last 3 weeks.      September 20, 2019- SOB worsening, using albuterol nebulizer 1-2 x daily every day. Currently on Prednisone 10 mg daily, with 20mg taper when needed. States having to breath out of mouth to exhale. Stopped Dupixent due to lack of benefit.     June 20, 2019- Dupixent injections started Feb 2019, pt states no benefit, Breathing has worsened. Exercise tolerance has decreased. Currently taking 10 mg prednisone daily, taken prednisone 20 mg taper 5 x in 3 months.    03/20/2019-Breathing worsened in past month, on dupixent since January, Currently on Trelegy 1 puff daily and prednisone 10 mg daily every day.   No fever, no body aches, no pain, no wheeze, not able to do activities with out shortness of breath. Occasional non productive cough.     12/20/18- Breathing worsened, SOB with any exertion, not taking advair due to cost.  On long term prednisone therapy for uncontrolled severe persistent asthma. Eosinophil count artificially low due steroid use.    11/9/18- ER at [place] for chest pain, chest tightness, elevated pulse rate of 130, Discharged  Dx: COPD exacerbation, treated with albuterol nebulizer. States Advair cost to high, $183 month, States recently retired.   Using Duo neb 2x daily, 2-3  albuterol inhaler daily.    9/20/18- pt presents in clinic alone, states taking prednisone 10 mg daily and 20 mg daily twice a week when SOB, has taken 40 mg a day once last week. Uses rescue inhaler most days after climbing stairs at work. Has trouble sleeping. Discussed fasenra and pharmacy unable to contact patient. States he does not carry phone at work during day, does not answer numbers he does not know, and has no voicemail set up at moment. States using Advair Diskus daily. Cough every few days, non productive. Worse at night.      July 5, 2018- on prednisone 5/d with 20/d up to 3 weekly.  Was in hospital December.  Breathing worse, saw asbestos  and advised 2nd opinion, to see Dr Rodriguez soon.  No fasenra as pharmacy unable to contact pt. Pt ues prednisone 5/d and not using trelegy- has one puff left on 14 day sample given in April and relates trelegy not seems to help.  April 5 - increase prednisone from 5/d to 20/d since last yr - breathing still poor.  Rest good - but not better on prednisone 20/d.  No big improvement on incruse. Sinus better on flonase.     12/28/2017 pt very active, bikes to work with tool boxes with no problem. Had had hospital stays every 6 months til started prednisone continiously  5 yrs ago.  Pt recently flared with recent NSR stay. Pt had pft with fev1 50% at 1.53 liter 12/15/2017.  Very mahmood still.  On breo and singulair.  Use spiriva in past.     From consult last month:Plan:   Pt has eosinophillic asthma and copd.  Pt should respond to singulair.  Higher dose steroid action plan needed.  He would likely do very well with il5 rx.     outpt soon on prednisone 60/d x 7 with taper, going to breo 200, and singulair would be good with current rx.  Would not strop prednisone.  outpt f/u recommended. I do no see asbestos complications.  History of Present Illness:  Cc is sob x 3 days.   Pt worked [employer] as .  Stopped smoking yrs ago. FH + asthma with no prior h/o  asthma.  Has had cough and wheezes and sob going back prior to [place].  Pt noted needed freq steroids so was kept on prednisone 5 mg daily with need to increase dose ever 2-3 months because of increase sob.  Pt uses breo 100 and xopenex works better than ventolin.  Pt has chr nasal drainage but no bad infections.  abx not used.  He denies spiriva helped in past and denies using daliresp.    Pt rides bike to work - bike weighs 300 lbs with tools for trade.  Pt very active and still works - lives with daughter.    Reviewed note    HPI:      2018 - Pt with known COPD, asthma (sees Dr Eddy) here for second opinion regarding asbestos lung disease.  Started working on [employer] in .  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  He started working as a  in  until present time and reports that he has had asbestos exposure.  He denies working directly with asbestos but did work in the vicinity.  He reports that Dr Eddy thinks he has asbestos issues.  Has had PFT but doesn't remember when.  He has chronic SOB, SHOEMAKER (being addressed by Dr Eddy)  He only uses prn inhalers.  Has some chronic cough and wheezing.  Feels that SOB is worse but he rides bike around at work (weights about 200#).        The chief compliant  problem is stable      PFSH:  Past Medical History:   Diagnosis Date    COPD (chronic obstructive pulmonary disease)     Hyperlipidemia     Hypertension     Obstructive sleep apnea          Past Surgical History:   Procedure Laterality Date    INGUINAL HERNIA REPAIR Left     SHOULDER ARTHROSCOPY Right     WRIST SURGERY Left      Social History     Tobacco Use    Smoking status: Former Smoker     Packs/day: 2.00     Years: 40.00     Pack years: 80.00     Quit date: 3/24/2009     Years since quittin.8    Smokeless tobacco: Never Used   Substance Use  "Topics    Alcohol use: Yes     Comment: 6-12 beers/night; some days none.    Drug use: No     Family History   Problem Relation Age of Onset    Cancer Mother     Asthma Mother     Heart disease Father     COPD Father     Cancer Father     Cancer Brother         colon cancer    No Known Problems Brother     No Known Problems Brother      Review of patient's allergies indicates:  No Known Allergies  I have reviewed past medical, family, and social history. I have reviewed previous nurse notes.    Performance Status:The patient's activity level is household activities.       Review of Systems:  a review of eleven systems covering constitutional, Eye, HEENT, Psych, Respiratory, Cardiac, GI, , Musculoskeletal, Endocrine, Dermatologic was negative except for pertinent findings as listed ABOVE and below: pertinent positive as above, rest is good  Fatigue, shortness of breath, cough       Exam:Comprehensive exam done. BP (!) 148/73 (BP Location: Left arm, Patient Position: Sitting, BP Method: Medium (Automatic))   Pulse 61   Ht 5' 7" (1.702 m)   Wt 86 kg (189 lb 11.3 oz)   SpO2 96% Comment: on room air at rest  BMI 29.71 kg/m²   Exam included Vitals as listed, and patient's appearance and affect and alertness and mood, oral exam for yeast and hygiene and pharynx lesions and Mallapatti (M) score, neck with inspection for jvd and masses and thyroid abnormalities and lymph nodes (supraclavicular and infraclavicular nodes and axillary also examined and noted if abn), chest exam included symmetry and effort and fremitus and percussion and auscultation, cardiac exam included rhythm and gallops and murmur and rubs and jvd and edema, abdominal exam for mass and hepatosplenomegaly and tenderness and hernias and bowel sounds, Musculoskeletal exam with muscle tone and posture and mobility/gait and  strength, and skin for rashes and cyanosis and pallor and turgor, extremity for clubbing.  Findings were normal " "except for pertinent findings listed below: M2; BS clear.         Radiographs (ct chest and cxr) reviewed: results reviewed by direct vision  CTA Chest Non-Coronary 05/04/2020   Limited CT pulmonary angiogram without evidence for definite filling defects within the adequately opacified portions of the pulmonary arteries.  Airspace consolidation in the right lung base and to a lesser extent in the left lung base may represent areas of pneumonia.  Thickening of the wall of the mid to distal esophagus.  This could be further assessed with upper endoscopy or an esophagram to evaluate the clinical significance of this finding.  Mildly prominent lymph nodes in the subcarinal region.  Diffuse fatty change throughout the liver.  Emphysematous changes in the lungs bilaterally.    X-Ray Chest AP Portable 05/04/2020   The heart size is within normal limits.  There are calcifications in the aortic arch.  There are linear foci of atelectasis or parenchymal scarring in the lung bases bilaterally.  There are no pleural effusions.  The osseous structures are intact.    CT Chest Without Contrast 12/20/2019   Emphysema.  Atherosclerotic calcification of the aorta, origins of the right and left subclavian arteries and coronary arteries.    CT Chest Without Contrast 07/09/2019  Viewed by direct vision, poor quality images 74 images total not a sufficient study.  The lungs are hyperexpanded with bullous emphysematous changes seen in the upper lobes.  The lower lobes demonstrate prominent interlobar septa.  There are few patchy" bud in tree "appearance seen in the left lower lobe.  There are no pulmonary nodules or suspicious masses.  A small bilateral pleural effusion is present which could be physiologic.    The heart is normal in size however demonstrates coronary artery calcifications.  The tracheobronchial airway is slightly dilated.  There is shotty lymph nodes noted of the mediastinum.  Note is made of calcifications of the " thoracic aorta.     XR CHEST 1 VIEW 03/20/2019   Negative chest x-ray     2D ECHO WITH COLOR FLOW DOPPLER 12/15/2017   3 - Normal left ventricular systolic function (EF 65-70%).     4 - Impaired LV relaxation, normal LAP (grade 1 diastolic dysfunction).       Labs reviewed       Lab Results   Component Value Date    WBC 7.50 05/06/2020    RBC 3.83 (L) 05/06/2020    HGB 12.0 (L) 05/06/2020    HCT 35.0 (L) 05/06/2020    MCV 91 05/06/2020    MCH 31.3 (H) 05/06/2020    MCHC 34.3 05/06/2020    RDW 14.4 05/06/2020     05/06/2020    MPV 7.9 (L) 05/06/2020    GRAN 6.0 05/06/2020    GRAN 79.3 (H) 05/06/2020    LYMPH 0.7 (L) 05/06/2020    LYMPH 9.6 (L) 05/06/2020    MONO 0.6 05/06/2020    MONO 8.2 05/06/2020    EOS 0.2 05/06/2020    BASO 0.10 05/06/2020    EOSINOPHIL 2.1 05/06/2020    BASOPHIL 0.8 05/06/2020     Results for MADELEINE EVANS (MRN [id number]) as of 12/20/2018 14:08   Ref. Range 12/11/2017 05:14   Eos # Latest Ref Range: 0.0 - 0.5 K/uL 0.5       PFT results reviewed  Pulmonary Functions Testing Results:  PFT results reviewed fev1 50 % at 1.53 with no bd response 12/15/2017      Spirometry bronchodilator, lung volume by gas dilution, diffusion capacity measured July 9, 2019.  The FEV1 to FVC ratio was only 54%, this indicates airflow obstruction.  The FEV1 measured 59% predicted at 1.75 L.  The of 5% improvement in the FEV1   following bronchodilator was not statistically significant, 12% seem to for statistical significance.  Total lung capacity was normal.  Diffusion was low, uncorrected for anemia present, at 61%.     There is  severe airflow obstruction, no bronchodilator response, no restriction, and loss of diffusion measured.  Clinical correlation recommended.     6 min walk study was done February 9, 2021. Baseline room air saturation was 96%. With ambulation O2 sat felt to 92% but no lower. Patient walked about 86% of the reference distance of 425 m.       EPWORTH SLEEPINESS SCALE SCORE 16 on  7/15/2020  Sleep study 8/4/2020= AHI 23.4      Plan:  Clinical impression is resonably certain and repeated evaluation prn +/- follow up will be needed as below.    Brad was seen today for 6m f/u and shortness of breath.    Diagnoses and all orders for this visit:    Shortness of breath  -     CT Chest Without Contrast; Future  -     Complete PFT with bronchodilator; Future    Chronic obstructive pulmonary disease, unspecified COPD type  -     Six Minute Walk Test to qualify for Home Oxygen; Future  -     budesonide-glycopyr-formoterol (BREZTRI AEROSPHERE) 160-9-4.8 mcg/actuation HFAA; Inhale 2 puffs into the lungs 2 (two) times a day.    Panlobular emphysema  -     Six Minute Walk Test to qualify for Home Oxygen; Future  -     budesonide-glycopyr-formoterol (BREZTRI AEROSPHERE) 160-9-4.8 mcg/actuation HFAA; Inhale 2 puffs into the lungs 2 (two) times a day.    Bronchiectasis without complication  -     mucus clearing device (ACAPELLA, FLUTTER); 1 Device by Misc.(Non-Drug; Combo Route) route 2 (two) times daily.    Obstructive sleep apnea syndrome     - wear CPAP nightly         Follow up in about 3 months (around 5/9/2022), or if symptoms worsen or fail to improve.    Discussed with patient above for education the following:      Patient Instructions   Percussion therapy instruction  Do breathing treatments daily with aerobika  Device attached. Can use every 4 hours if needed.     Stop Trelegy and start BReztri 2 puffs twice a day every day    Use CPAP nightly for sleep apnea    We discuss a flutter valve procedure to treat Emphysema, will need to repeat CT of chest and lung function test but it has to be done at main campus.     Six minute walk to evaluate for oxygen.

## 2022-02-09 NOTE — PATIENT INSTRUCTIONS
Percussion therapy instruction  Do breathing treatments daily with aerobika  Device attached. Can use every 4 hours if needed.     Stop Trelegy and start BReztri 2 puffs twice a day every day    Use CPAP nightly for sleep apnea    We discuss a flutter valve procedure to treat Emphysema, will need to repeat CT of chest and lung function test but it has to be done at main campus.     Six minute walk to evaluate for oxygen.

## 2022-02-10 LAB
BR6MWT: NORMAL
BRPFT: NORMAL
DLCO ADJ PRE: 14.28 ML/(MIN*MMHG)
DLCO SINGLE BREATH LLN: 17.87
DLCO SINGLE BREATH PRE REF: 57.6 %
DLCO SINGLE BREATH REF: 24.79
DLCOC SBVA LLN: 2.55
DLCOC SBVA PRE REF: 69.5 %
DLCOC SBVA REF: 3.81
DLCOC SINGLE BREATH LLN: 17.87
DLCOC SINGLE BREATH PRE REF: 57.6 %
DLCOC SINGLE BREATH REF: 24.79
DLCOVA LLN: 2.55
DLCOVA PRE REF: 69.5 %
DLCOVA PRE: 2.65 ML/(MIN*MMHG*L)
DLCOVA REF: 3.81
DLVAADJ PRE: 2.65 ML/(MIN*MMHG*L)
ERVN2 LLN: -16449.01
ERVN2 PRE REF: 50.2 %
ERVN2 PRE: 0.5 L
ERVN2 REF: 0.99
FEF 25 75 CHG: 13.8 %
FEF 25 75 LLN: 1.32
FEF 25 75 POST REF: 24.5 %
FEF 25 75 PRE REF: 21.6 %
FEF 25 75 REF: 3.03
FET100 CHG: -7.4 %
FEV1 CHG: 0.4 %
FEV1 FVC CHG: 2.2 %
FEV1 LLN: 1.98
FEV1 POST REF: 69.1 %
FEV1 PRE REF: 68.9 %
FEV1 REF: 2.82
FRCN2 LLN: 2.52
FRCN2 PRE REF: 74 %
FRCN2 REF: 3.51
FVC CHG: -1.8 %
FVC LLN: 2.65
FVC POST REF: 97.6 %
FVC PRE REF: 99.3 %
FVC REF: 3.66
IVC PRE: 3.38 L
IVC SINGLE BREATH LLN: 2.65
IVC SINGLE BREATH PRE REF: 92.5 %
IVC SINGLE BREATH REF: 3.66
MVV LLN: 96
MVV PRE REF: 82.5 %
MVV REF: 113
PEF CHG: 1.4 %
PEF LLN: 5.63
PEF POST REF: 87.5 %
PEF PRE REF: 86.3 %
PEF REF: 7.62
POST FEF 25 75: 0.74 L/S
POST FET 100: 8.78 SEC
POST FEV1 FVC: 54.59 %
POST FEV1: 1.95 L
POST FVC: 3.57 L
POST PEF: 6.67 L/S
PRE DLCO: 14.28 ML/(MIN*MMHG)
PRE FEF 25 75: 0.65 L/S
PRE FET 100: 9.49 SEC
PRE FEV1 FVC: 53.41 %
PRE FEV1: 1.94 L
PRE FRC N2: 2.6 L
PRE FVC: 3.63 L
PRE MVV: 93 L/MIN
PRE PEF: 6.58 L/S
RVN2 LLN: 1.84
RVN2 PRE REF: 82 %
RVN2 PRE: 2.06 L
RVN2 REF: 2.52
RVN2TLCN2 LLN: 31.89
RVN2TLCN2 PRE REF: 86.2 %
RVN2TLCN2 PRE: 35.23 %
RVN2TLCN2 REF: 40.87
TLCN2 LLN: 5.35
TLCN2 PRE REF: 90 %
TLCN2 PRE: 5.86 L
TLCN2 REF: 6.5
VA PRE: 5.39 L
VA SINGLE BREATH LLN: 6.35
VA SINGLE BREATH PRE REF: 84.8 %
VA SINGLE BREATH REF: 6.35
VCMAXN2 LLN: 2.65
VCMAXN2 PRE REF: 103.7 %
VCMAXN2 PRE: 3.79 L
VCMAXN2 REF: 3.66

## 2022-02-15 ENCOUNTER — HOSPITAL ENCOUNTER (OUTPATIENT)
Dept: RADIOLOGY | Facility: HOSPITAL | Age: 72
Discharge: HOME | End: 2022-02-15
Attending: NURSE PRACTITIONER
Payer: MEDICARE

## 2022-02-15 DIAGNOSIS — R06.02 SHORTNESS OF BREATH: ICD-10-CM

## 2022-02-15 PROCEDURE — 71250 CT CHEST WITHOUT CONTRAST: ICD-10-PCS | Mod: 26,,, | Performed by: RADIOLOGY

## 2022-02-15 PROCEDURE — 71250 CT THORAX DX C-: CPT | Mod: TC

## 2022-02-15 PROCEDURE — 71250 CT THORAX DX C-: CPT | Mod: 26,,, | Performed by: RADIOLOGY

## 2022-02-17 ENCOUNTER — PATIENT MESSAGE (OUTPATIENT)
Dept: PULMONOLOGY | Facility: CLINIC | Age: 72
End: 2022-02-17
Payer: MEDICARE

## 2022-03-27 DIAGNOSIS — J43.9 PULMONARY EMPHYSEMA, UNSPECIFIED EMPHYSEMA TYPE: ICD-10-CM

## 2022-03-27 DIAGNOSIS — Z92.241 STEROID-DEPENDENT CHRONIC OBSTRUCTIVE PULMONARY DISEASE: ICD-10-CM

## 2022-03-27 DIAGNOSIS — J44.9 STEROID-DEPENDENT CHRONIC OBSTRUCTIVE PULMONARY DISEASE: ICD-10-CM

## 2022-03-29 RX ORDER — BUDESONIDE 0.5 MG/2ML
0.5 INHALANT ORAL 2 TIMES DAILY PRN
Qty: 120 ML | Refills: 1 | Status: SHIPPED | OUTPATIENT
Start: 2022-03-29 | End: 2024-01-31

## 2022-04-06 ENCOUNTER — TELEPHONE (OUTPATIENT)
Dept: PULMONOLOGY | Facility: CLINIC | Age: 72
End: 2022-04-06
Payer: MEDICARE

## 2022-04-06 NOTE — TELEPHONE ENCOUNTER
Called patient and let him know I would put one up front for him.    He v/u.  ----- Message from Trina Hammonds sent at 4/6/2022 12:40 PM CDT -----  Type:  Pharmacy Calling to Clarify an RX    Name of Caller:  patient   Pharmacy Name:    [facility]  [id number]  [place] [address]  Phone: [phone] Fax: [phone]  Prescription Name:  mucus clearing device (ACAPELLA, FLUTTER)  What do they need to clarify?:  pharmacy states they don't know what this is   Best Call Back Number:  [phone]   Additional Information:  please advise-thank you

## 2022-04-25 DIAGNOSIS — Z92.241 STEROID-DEPENDENT CHRONIC OBSTRUCTIVE PULMONARY DISEASE: ICD-10-CM

## 2022-04-25 DIAGNOSIS — J43.9 PULMONARY EMPHYSEMA, UNSPECIFIED EMPHYSEMA TYPE: ICD-10-CM

## 2022-04-25 DIAGNOSIS — Z79.52 CURRENT CHRONIC USE OF SYSTEMIC STEROIDS: ICD-10-CM

## 2022-04-25 DIAGNOSIS — J44.9 STEROID-DEPENDENT CHRONIC OBSTRUCTIVE PULMONARY DISEASE: ICD-10-CM

## 2022-04-27 RX ORDER — PREDNISONE 10 MG/1
10 TABLET ORAL DAILY
Qty: 90 TABLET | Refills: 1 | Status: SHIPPED | OUTPATIENT
Start: 2022-04-27 | End: 2022-12-16 | Stop reason: SDUPTHER

## 2022-05-10 ENCOUNTER — OFFICE VISIT (OUTPATIENT)
Dept: PULMONOLOGY | Facility: CLINIC | Age: 72
End: 2022-05-10
Payer: MEDICARE

## 2022-05-10 VITALS
OXYGEN SATURATION: 97 % | HEIGHT: 67 IN | DIASTOLIC BLOOD PRESSURE: 72 MMHG | SYSTOLIC BLOOD PRESSURE: 162 MMHG | WEIGHT: 189.69 LBS | BODY MASS INDEX: 29.77 KG/M2 | HEART RATE: 67 BPM

## 2022-05-10 DIAGNOSIS — J96.11 CHRONIC RESPIRATORY FAILURE WITH HYPOXIA: ICD-10-CM

## 2022-05-10 DIAGNOSIS — Z92.241 STEROID-DEPENDENT CHRONIC OBSTRUCTIVE PULMONARY DISEASE: ICD-10-CM

## 2022-05-10 DIAGNOSIS — J44.9 STEROID-DEPENDENT CHRONIC OBSTRUCTIVE PULMONARY DISEASE: ICD-10-CM

## 2022-05-10 DIAGNOSIS — G47.33 OBSTRUCTIVE SLEEP APNEA SYNDROME: Primary | ICD-10-CM

## 2022-05-10 PROCEDURE — 3288F FALL RISK ASSESSMENT DOCD: CPT | Mod: CPTII,S$GLB,, | Performed by: NURSE PRACTITIONER

## 2022-05-10 PROCEDURE — 1101F PR PT FALLS ASSESS DOC 0-1 FALLS W/OUT INJ PAST YR: ICD-10-PCS | Mod: CPTII,S$GLB,, | Performed by: NURSE PRACTITIONER

## 2022-05-10 PROCEDURE — 3077F SYST BP >= 140 MM HG: CPT | Mod: CPTII,S$GLB,, | Performed by: NURSE PRACTITIONER

## 2022-05-10 PROCEDURE — 99214 OFFICE O/P EST MOD 30 MIN: CPT | Mod: S$GLB,,, | Performed by: NURSE PRACTITIONER

## 2022-05-10 PROCEDURE — 1101F PT FALLS ASSESS-DOCD LE1/YR: CPT | Mod: CPTII,S$GLB,, | Performed by: NURSE PRACTITIONER

## 2022-05-10 PROCEDURE — 4010F ACE/ARB THERAPY RXD/TAKEN: CPT | Mod: CPTII,S$GLB,, | Performed by: NURSE PRACTITIONER

## 2022-05-10 PROCEDURE — 99214 PR OFFICE/OUTPT VISIT, EST, LEVL IV, 30-39 MIN: ICD-10-PCS | Mod: S$GLB,,, | Performed by: NURSE PRACTITIONER

## 2022-05-10 PROCEDURE — 1159F PR MEDICATION LIST DOCUMENTED IN MEDICAL RECORD: ICD-10-PCS | Mod: CPTII,S$GLB,, | Performed by: NURSE PRACTITIONER

## 2022-05-10 PROCEDURE — 1159F MED LIST DOCD IN RCRD: CPT | Mod: CPTII,S$GLB,, | Performed by: NURSE PRACTITIONER

## 2022-05-10 PROCEDURE — 1126F PR PAIN SEVERITY QUANTIFIED, NO PAIN PRESENT: ICD-10-PCS | Mod: CPTII,S$GLB,, | Performed by: NURSE PRACTITIONER

## 2022-05-10 PROCEDURE — 3078F DIAST BP <80 MM HG: CPT | Mod: CPTII,S$GLB,, | Performed by: NURSE PRACTITIONER

## 2022-05-10 PROCEDURE — 1160F RVW MEDS BY RX/DR IN RCRD: CPT | Mod: CPTII,S$GLB,, | Performed by: NURSE PRACTITIONER

## 2022-05-10 PROCEDURE — 3008F PR BODY MASS INDEX (BMI) DOCUMENTED: ICD-10-PCS | Mod: CPTII,S$GLB,, | Performed by: NURSE PRACTITIONER

## 2022-05-10 PROCEDURE — 99999 PR PBB SHADOW E&M-EST. PATIENT-LVL V: ICD-10-PCS | Mod: PBBFAC,,, | Performed by: NURSE PRACTITIONER

## 2022-05-10 PROCEDURE — 1126F AMNT PAIN NOTED NONE PRSNT: CPT | Mod: CPTII,S$GLB,, | Performed by: NURSE PRACTITIONER

## 2022-05-10 PROCEDURE — 3008F BODY MASS INDEX DOCD: CPT | Mod: CPTII,S$GLB,, | Performed by: NURSE PRACTITIONER

## 2022-05-10 PROCEDURE — 3078F PR MOST RECENT DIASTOLIC BLOOD PRESSURE < 80 MM HG: ICD-10-PCS | Mod: CPTII,S$GLB,, | Performed by: NURSE PRACTITIONER

## 2022-05-10 PROCEDURE — 1160F PR REVIEW ALL MEDS BY PRESCRIBER/CLIN PHARMACIST DOCUMENTED: ICD-10-PCS | Mod: CPTII,S$GLB,, | Performed by: NURSE PRACTITIONER

## 2022-05-10 PROCEDURE — 99999 PR PBB SHADOW E&M-EST. PATIENT-LVL V: CPT | Mod: PBBFAC,,, | Performed by: NURSE PRACTITIONER

## 2022-05-10 PROCEDURE — 3288F PR FALLS RISK ASSESSMENT DOCUMENTED: ICD-10-PCS | Mod: CPTII,S$GLB,, | Performed by: NURSE PRACTITIONER

## 2022-05-10 PROCEDURE — 3077F PR MOST RECENT SYSTOLIC BLOOD PRESSURE >= 140 MM HG: ICD-10-PCS | Mod: CPTII,S$GLB,, | Performed by: NURSE PRACTITIONER

## 2022-05-10 PROCEDURE — 4010F PR ACE/ARB THEARPY RXD/TAKEN: ICD-10-PCS | Mod: CPTII,S$GLB,, | Performed by: NURSE PRACTITIONER

## 2022-05-10 NOTE — PROGRESS NOTES
5/10/2022    Brad Nowak  Office Note    Chief Complaint   Patient presents with    3m f/u    COPD    Shortness of Breath       HPI:    5/10/2022- has CPAP tried wearing but it wakes him up with dry mouth after 1 hour, is sleeping with oxygen only. Already has humidity.   Cough is resolved with aerobika and nebulizer treatments 2x daily, Helps him clear more mucous.    SOB severe complaint, difficult to walk out side of home with out stopping to rest. SOB after showing, improves with rest, has supplemental oxygen at 3L with benefit.   On prednisone 10 mg daily with 20 mg as needed.     2/9/22- received new CPAP following recall of original in past 2 weeks, wearing nightly with benefit; does not have supplemental oxygen.    SOB- worsening with time, difficult to do any exertion with out stopping to rest. Using albuterol inhaler 2x weekly with little benefit, severe complaint, worse in very cold weather. Difficult to leave home due to SOB.   Using nebulizer as needed. States nebulizer does not help with breathing.     No weightloss, chest tightness, wheeze or  night sweating, occasional cough non productive.       8/9/2021- fell at home fractured ribs 7/25/21. States pain is recurrent, worse with movement. Tx with pain medication, 10 on 0-10 scale with movement. Improves with pain medication with no current pain.  SOB- unchanged. Daily, worse after taking shower and cutting grass, improves with rest, able to do activities of daily living. On prednisone 10 mg daily. Taking 20 mg one day weekly. Associated with occasional non productive cough.  No chest tightness or wheezing. Has CPAP, not wearing due to CPAP causes dry mouth that is uncomfortable. States he breaths better with out it.     2/9/21- wearing CPAP nightly with some benefit. Did not receive the new mask as ordered. Mask is staying on all night. Waking up every night 1-2 x nightly around 1 or 2 pm.   SOB- unchanged, daily, can still perform tasks but  has to move slowly, currently busy doing home repairs. Currently on Prednisone 10 mg daily with 20 mg once a week.   No cough, chest tightness, or wheeze. Using nebulizer 2x daily.        11/9/2020- states did not receive new mask from [facility], states when he wakes up mask is not correct on face and air is leaking.   Wearing CPAP nightly, states feels about the same.   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 On 5 mg prednisone daily. Using Albuterol 20 mg 2-3 x weekly.   Cough, chest tightness, wheeze- unchanged, daily complaints.     10/15/2020- received CPAP machine from [facility] on Sept 17th, states wearing every night between 9:30- 5 am. States his daytime drowsiness has worsened since starting CPAP, still trying to adjust to wearing CPAP full face mask. States not comfortable. Has air leaking from above nose.   Returned oxygen concentrator to [facility] due to it not working.   Cough- recurrent problem, unchanged, non productive, SOB- unchanged, using Trelegy daily, budesonide daily, albuterol daily, 5 mg prednisone. Has to stop outdoor chores due to SOB, no limits on indoor activities.       9/9/20- states oxygen concentrator is not working, has turned up to 5 L with very little air coming out of tubing. Titration study completed. Order sent for CPAP.   Wanting to change from [facility] to different [employer] company.     SOB- states felt better the morning after CPAP titration, worse with exertion. No improvement on supplemental oxygen, improves with rest.   Associated with chest tightness that improves with prednisone.   Using budesonide daily with only slight improvement in SOB  Cough- recurrent problem, non productive, mild,       8/27/2020- had sleep study, positive results and waiting for Titration study.  No change in fatigue, SOB, daily, chronic complaint, using prednisone as needed on average 2-3 days weekly. Takes for Chest tightness.   Using nebulizer  Budesonide 2 times daily with minimal improvement.  No cough, wearing supplemental oxygen at night.     7/15/2020- shortness of breath worsening slowly with time, states harder to breath following pneumonia May 2020 tx in [facility]. SOB severe and hinders his activity level, wearing oxygen only at night 5L. States it does not help much.   Complaint fatigue, daytime drowsiness, sleeping time varies, feeling drowsy in mornings, sleeps alone.  Cough- recurrent problem, occasionally productive light yellow mucous cream colored, no nocturnal arousals, no chest tightness or wheeze.  I05 therapy did not improve breathing, not currently on daily prednisone, using nebulizer as needed 2-3x daily. Trelegy    4/15/2020- SOB- unchanged, daily, worse with exertion, limiting daily activities and slows him down, decreased Prednisone 20 mg once tp twice a week. Has supplemental oxygen at 5L at night.     December 20, 2019- received oxygen concentrator, states does not improve SOB during day, SOB- stable, daily problem, worse with exertion, improves with nebulized Albuterol. Increased Prednisone himself to 10 mg in am and 40 mg nightly every night for last 3 weeks.      September 20, 2019- SOB worsening, using albuterol nebulizer 1-2 x daily every day. Currently on Prednisone 10 mg daily, with 20mg taper when needed. States having to breath out of mouth to exhale. Stopped Dupixent due to lack of benefit.     June 20, 2019- Dupixent injections started Feb 2019, pt states no benefit, Breathing has worsened. Exercise tolerance has decreased. Currently taking 10 mg prednisone daily, taken prednisone 20 mg taper 5 x in 3 months.    03/20/2019-Breathing worsened in past month, on dupixent since January, Currently on Trelegy 1 puff daily and prednisone 10 mg daily every day.   No fever, no body aches, no pain, no wheeze, not able to do activities with out shortness of breath. Occasional non productive cough.     12/20/18- Breathing  worsened, SOB with any exertion, not taking advair due to cost.  On long term prednisone therapy for uncontrolled severe persistent asthma. Eosinophil count artificially low due steroid use.    11/9/18- ER at [place] for chest pain, chest tightness, elevated pulse rate of 130, Discharged  Dx: COPD exacerbation, treated with albuterol nebulizer. States Advair cost to high, $183 month, States recently retired.   Using Duo neb 2x daily, 2-3 albuterol inhaler daily.    9/20/18- pt presents in clinic alone, states taking prednisone 10 mg daily and 20 mg daily twice a week when SOB, has taken 40 mg a day once last week. Uses rescue inhaler most days after climbing stairs at work. Has trouble sleeping. Discussed fasenra and pharmacy unable to contact patient. States he does not carry phone at work during day, does not answer numbers he does not know, and has no voicemail set up at moment. States using Advair Diskus daily. Cough every few days, non productive. Worse at night.      July 5, 2018- on prednisone 5/d with 20/d up to 3 weekly.  Was in hospital December.  Breathing worse, saw asbestos  and advised 2nd opinion, to see Dr Rodriguez soon.  No fasenra as pharmacy unable to contact pt. Pt ues prednisone 5/d and not using trelegy- has one puff left on 14 day sample given in April and relates trelegy not seems to help.  April 5 - increase prednisone from 5/d to 20/d since last yr - breathing still poor.  Rest good - but not better on prednisone 20/d.  No big improvement on incruse. Sinus better on flonase.     12/28/2017 pt very active, bikes to work with tool boxes with no problem. Had had hospital stays every 6 months til started prednisone continiously  5 yrs ago.  Pt recently flared with recent NSR stay. Pt had pft with fev1 50% at 1.53 liter 12/15/2017.  Very mahmood still.  On breo and singulair.  Use spiriva in past.     From consult last month:Plan:   Pt has eosinophillic asthma and copd.  Pt should  respond to singulair.  Higher dose steroid action plan needed.  He would likely do very well with il5 rx.     outpt soon on prednisone 60/d x 7 with taper, going to breo 200, and singulair would be good with current rx.  Would not strop prednisone.  outpt f/u recommended. I do no see asbestos complications.  History of Present Illness:  Cc is sob x 3 days.   Pt worked refinery as .  Stopped smoking yrs ago. FH + asthma with no prior h/o asthma.  Has had cough and wheezes and sob going back prior to [place].  Pt noted needed freq steroids so was kept on prednisone 5 mg daily with need to increase dose ever 2-3 months because of increase sob.  Pt uses breo 100 and xopenex works better than ventolin.  Pt has chr nasal drainage but no bad infections.  abx not used.  He denies spiriva helped in past and denies using daliresp.    Pt rides bike to work - bike weighs 300 lbs with tools for trade.  Pt very active and still works - lives with daughter.    Reviewed note    HPI:      8/21/2018 - Pt with known COPD, asthma (sees Dr Eddy) here for second opinion regarding asbestos lung disease.  Started working on [employer] in 1968.  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  He started working as a  in 1987 until present time and reports that he has had asbestos exposure.  He denies working directly with asbestos but did work in the vicinity.  He reports that Dr Eddy thinks he has asbestos issues.  Has had PFT but doesn't remember when.  He has chronic SOB, SHOEMAKER (being addressed by Dr Eddy)  He only uses prn inhalers.  Has some chronic cough and wheezing.  Feels that SOB is worse but he rides bike around at work (weights about 200#).        The chief compliant  problem varies with instablilty at time        PFSH:  Past Medical History:   Diagnosis Date    COPD (chronic  "obstructive pulmonary disease)     Hyperlipidemia     Hypertension     Obstructive sleep apnea          Past Surgical History:   Procedure Laterality Date    INGUINAL HERNIA REPAIR Left     SHOULDER ARTHROSCOPY Right     WRIST SURGERY Left      Social History     Tobacco Use    Smoking status: Former Smoker     Packs/day: 2.00     Years: 40.00     Pack years: 80.00     Quit date: 3/24/2009     Years since quittin.1    Smokeless tobacco: Never Used   Substance Use Topics    Alcohol use: Yes     Comment: 6-12 beers/night; some days none.    Drug use: No     Family History   Problem Relation Age of Onset    Cancer Mother     Asthma Mother     Heart disease Father     COPD Father     Cancer Father     Cancer Brother         colon cancer    No Known Problems Brother     No Known Problems Brother      Review of patient's allergies indicates:  No Known Allergies  I have reviewed past medical, family, and social history. I have reviewed previous nurse notes.    Performance Status:The patient's activity level is household activities.       Review of Systems:  a review of eleven systems covering constitutional, Eye, HEENT, Psych, Respiratory, Cardiac, GI, , Musculoskeletal, Endocrine, Dermatologic was negative except for pertinent findings as listed ABOVE and below: pertinent positive as above, rest is good  Fatigue, shortness of breath,        Exam:Comprehensive exam done. BP (!) 162/72 (BP Location: Right arm, Patient Position: Sitting, BP Method: Medium (Automatic))   Pulse 67   Ht 5' 7" (1.702 m)   Wt 86 kg (189 lb 11.3 oz)   SpO2 97% Comment: on room air at rest  BMI 29.71 kg/m²   Exam included Vitals as listed, and patient's appearance and affect and alertness and mood, oral exam for yeast and hygiene and pharynx lesions and Mallapatti (M) score, neck with inspection for jvd and masses and thyroid abnormalities and lymph nodes (supraclavicular and infraclavicular nodes and axillary also " examined and noted if abn), chest exam included symmetry and effort and fremitus and percussion and auscultation, cardiac exam included rhythm and gallops and murmur and rubs and jvd and edema, abdominal exam for mass and hepatosplenomegaly and tenderness and hernias and bowel sounds, Musculoskeletal exam with muscle tone and posture and mobility/gait and  strength, and skin for rashes and cyanosis and pallor and turgor, extremity for clubbing.  Findings were normal except for pertinent findings listed below: M2; BS clear.         Radiographs (ct chest and cxr) reviewed: results reviewed by direct vision  CT chest 02/15/2022   1. Pulmonary emphysema and coronary artery disease.  2. Left pulmonary nodule. In a low risk patient, no further follow-up is recommended.  In a high-risk patient, 12 month follow-up CT could be considered.      CTA Chest Non-Coronary 05/04/2020   Limited CT pulmonary angiogram without evidence for definite filling defects within the adequately opacified portions of the pulmonary arteries.  Airspace consolidation in the right lung base and to a lesser extent in the left lung base may represent areas of pneumonia.  Thickening of the wall of the mid to distal esophagus.  This could be further assessed with upper endoscopy or an esophagram to evaluate the clinical significance of this finding.  Mildly prominent lymph nodes in the subcarinal region.  Diffuse fatty change throughout the liver.  Emphysematous changes in the lungs bilaterally.    X-Ray Chest AP Portable 05/04/2020   The heart size is within normal limits.  There are calcifications in the aortic arch.  There are linear foci of atelectasis or parenchymal scarring in the lung bases bilaterally.  There are no pleural effusions.  The osseous structures are intact.    CT Chest Without Contrast 12/20/2019   Emphysema.  Atherosclerotic calcification of the aorta, origins of the right and left subclavian arteries and coronary  "arteries.    CT Chest Without Contrast 07/09/2019  Viewed by direct vision, poor quality images 74 images total not a sufficient study.  The lungs are hyperexpanded with bullous emphysematous changes seen in the upper lobes.  The lower lobes demonstrate prominent interlobar septa.  There are few patchy" bud in tree "appearance seen in the left lower lobe.  There are no pulmonary nodules or suspicious masses.  A small bilateral pleural effusion is present which could be physiologic.    The heart is normal in size however demonstrates coronary artery calcifications.  The tracheobronchial airway is slightly dilated.  There is shotty lymph nodes noted of the mediastinum.  Note is made of calcifications of the thoracic aorta.     XR CHEST 1 VIEW 03/20/2019   Negative chest x-ray     2D ECHO WITH COLOR FLOW DOPPLER 12/15/2017   3 - Normal left ventricular systolic function (EF 65-70%).     4 - Impaired LV relaxation, normal LAP (grade 1 diastolic dysfunction).       Labs reviewed       Lab Results   Component Value Date    WBC 7.50 05/06/2020    RBC 3.83 (L) 05/06/2020    HGB 12.0 (L) 05/06/2020    HCT 35.0 (L) 05/06/2020    MCV 91 05/06/2020    MCH 31.3 (H) 05/06/2020    MCHC 34.3 05/06/2020    RDW 14.4 05/06/2020     05/06/2020    MPV 7.9 (L) 05/06/2020    GRAN 6.0 05/06/2020    GRAN 79.3 (H) 05/06/2020    LYMPH 0.7 (L) 05/06/2020    LYMPH 9.6 (L) 05/06/2020    MONO 0.6 05/06/2020    MONO 8.2 05/06/2020    EOS 0.2 05/06/2020    BASO 0.10 05/06/2020    EOSINOPHIL 2.1 05/06/2020    BASOPHIL 0.8 05/06/2020     Results for MADELEINE EVANS (MRN [id number]) as of 12/20/2018 14:08   Ref. Range 12/11/2017 05:14   Eos # Latest Ref Range: 0.0 - 0.5 K/uL 0.5       PFT results reviewed  Pulmonary Functions Testing Results:  PFT results reviewed fev1 50 % at 1.53 with no bd response 12/15/2017      Spirometry bronchodilator, lung volume by gas dilution, diffusion capacity measured July 9, 2019.  The FEV1 to FVC ratio was only " 54%, this indicates airflow obstruction.  The FEV1 measured 59% predicted at 1.75 L.  The of 5% improvement in the FEV1   following bronchodilator was not statistically significant, 12% seem to for statistical significance.  Total lung capacity was normal.  Diffusion was low, uncorrected for anemia present, at 61%.     There is  severe airflow obstruction, no bronchodilator response, no restriction, and loss of diffusion measured.  Clinical correlation recommended.     6 min walk study was done February 9, 2021. Baseline room air saturation was 96%. With ambulation O2 sat felt to 92% but no lower. Patient walked about 86% of the reference distance of 425 m.       EPWORTH SLEEPINESS SCALE SCORE 16 on 7/15/2020  Sleep study 8/4/2020= AHI 23.4    6 minute walk study was accomplished February 9, 2022. Baseline room air saturation was 91 percent. With ambulation O2 sat fell to 88 percent by 5 minutes. Patient need 2 liters of oxygen maintain O2 sat in the 90s. At the end of the walk on 2 liters of   oxygen O2 sat was 97 percent. Patient walked 70 percent of the reference distance at 340 meters.     Plan:  Clinical impression is resonably certain and repeated evaluation prn +/- follow up will be needed as below.    Brad was seen today for 3m f/u, copd and shortness of breath.    Diagnoses and all orders for this visit:    Obstructive sleep apnea syndrome  -     CPAP/BIPAP SUPPLIES    Chronic respiratory failure with hypoxia   - continue supplemental oxygen     Steroid-dependent chronic obstructive pulmonary disease     - continue current medication regiment        Follow up in about 3 months (around 8/10/2022), or if symptoms worsen or fail to improve.    Discussed with patient above for education the following:      Patient Instructions   Continue current COPD medication regiment    Wear CPAP nightly with heated tubing    Continue supplemental oxygen

## 2022-05-10 NOTE — PATIENT INSTRUCTIONS
Continue current COPD medication regiment    Wear CPAP nightly with heated tubing    Continue supplemental oxygen

## 2022-08-10 ENCOUNTER — OFFICE VISIT (OUTPATIENT)
Dept: PULMONOLOGY | Facility: CLINIC | Age: 72
End: 2022-08-10
Payer: MEDICARE

## 2022-08-10 VITALS
DIASTOLIC BLOOD PRESSURE: 71 MMHG | HEART RATE: 70 BPM | SYSTOLIC BLOOD PRESSURE: 144 MMHG | BODY MASS INDEX: 30.36 KG/M2 | WEIGHT: 193.44 LBS | HEIGHT: 67 IN | OXYGEN SATURATION: 95 %

## 2022-08-10 DIAGNOSIS — G47.33 OBSTRUCTIVE SLEEP APNEA SYNDROME: ICD-10-CM

## 2022-08-10 DIAGNOSIS — Z92.241 STEROID-DEPENDENT CHRONIC OBSTRUCTIVE PULMONARY DISEASE: Primary | ICD-10-CM

## 2022-08-10 DIAGNOSIS — J44.9 STEROID-DEPENDENT CHRONIC OBSTRUCTIVE PULMONARY DISEASE: Primary | ICD-10-CM

## 2022-08-10 DIAGNOSIS — J44.9 CHRONIC OBSTRUCTIVE PULMONARY DISEASE, UNSPECIFIED COPD TYPE: ICD-10-CM

## 2022-08-10 PROCEDURE — 3077F SYST BP >= 140 MM HG: CPT | Mod: CPTII,S$GLB,, | Performed by: NURSE PRACTITIONER

## 2022-08-10 PROCEDURE — 4010F PR ACE/ARB THEARPY RXD/TAKEN: ICD-10-PCS | Mod: CPTII,S$GLB,, | Performed by: NURSE PRACTITIONER

## 2022-08-10 PROCEDURE — 3008F BODY MASS INDEX DOCD: CPT | Mod: CPTII,S$GLB,, | Performed by: NURSE PRACTITIONER

## 2022-08-10 PROCEDURE — 99999 PR PBB SHADOW E&M-EST. PATIENT-LVL V: CPT | Mod: PBBFAC,,, | Performed by: NURSE PRACTITIONER

## 2022-08-10 PROCEDURE — 99213 OFFICE O/P EST LOW 20 MIN: CPT | Mod: S$GLB,,, | Performed by: NURSE PRACTITIONER

## 2022-08-10 PROCEDURE — 3008F PR BODY MASS INDEX (BMI) DOCUMENTED: ICD-10-PCS | Mod: CPTII,S$GLB,, | Performed by: NURSE PRACTITIONER

## 2022-08-10 PROCEDURE — 1101F PR PT FALLS ASSESS DOC 0-1 FALLS W/OUT INJ PAST YR: ICD-10-PCS | Mod: CPTII,S$GLB,, | Performed by: NURSE PRACTITIONER

## 2022-08-10 PROCEDURE — 3288F FALL RISK ASSESSMENT DOCD: CPT | Mod: CPTII,S$GLB,, | Performed by: NURSE PRACTITIONER

## 2022-08-10 PROCEDURE — 1125F PR PAIN SEVERITY QUANTIFIED, PAIN PRESENT: ICD-10-PCS | Mod: CPTII,S$GLB,, | Performed by: NURSE PRACTITIONER

## 2022-08-10 PROCEDURE — 99213 PR OFFICE/OUTPT VISIT, EST, LEVL III, 20-29 MIN: ICD-10-PCS | Mod: S$GLB,,, | Performed by: NURSE PRACTITIONER

## 2022-08-10 PROCEDURE — 1159F MED LIST DOCD IN RCRD: CPT | Mod: CPTII,S$GLB,, | Performed by: NURSE PRACTITIONER

## 2022-08-10 PROCEDURE — 1160F PR REVIEW ALL MEDS BY PRESCRIBER/CLIN PHARMACIST DOCUMENTED: ICD-10-PCS | Mod: CPTII,S$GLB,, | Performed by: NURSE PRACTITIONER

## 2022-08-10 PROCEDURE — 1101F PT FALLS ASSESS-DOCD LE1/YR: CPT | Mod: CPTII,S$GLB,, | Performed by: NURSE PRACTITIONER

## 2022-08-10 PROCEDURE — 4010F ACE/ARB THERAPY RXD/TAKEN: CPT | Mod: CPTII,S$GLB,, | Performed by: NURSE PRACTITIONER

## 2022-08-10 PROCEDURE — 3077F PR MOST RECENT SYSTOLIC BLOOD PRESSURE >= 140 MM HG: ICD-10-PCS | Mod: CPTII,S$GLB,, | Performed by: NURSE PRACTITIONER

## 2022-08-10 PROCEDURE — 99999 PR PBB SHADOW E&M-EST. PATIENT-LVL V: ICD-10-PCS | Mod: PBBFAC,,, | Performed by: NURSE PRACTITIONER

## 2022-08-10 PROCEDURE — 1125F AMNT PAIN NOTED PAIN PRSNT: CPT | Mod: CPTII,S$GLB,, | Performed by: NURSE PRACTITIONER

## 2022-08-10 PROCEDURE — 3288F PR FALLS RISK ASSESSMENT DOCUMENTED: ICD-10-PCS | Mod: CPTII,S$GLB,, | Performed by: NURSE PRACTITIONER

## 2022-08-10 PROCEDURE — 1160F RVW MEDS BY RX/DR IN RCRD: CPT | Mod: CPTII,S$GLB,, | Performed by: NURSE PRACTITIONER

## 2022-08-10 PROCEDURE — 1159F PR MEDICATION LIST DOCUMENTED IN MEDICAL RECORD: ICD-10-PCS | Mod: CPTII,S$GLB,, | Performed by: NURSE PRACTITIONER

## 2022-08-10 PROCEDURE — 3078F DIAST BP <80 MM HG: CPT | Mod: CPTII,S$GLB,, | Performed by: NURSE PRACTITIONER

## 2022-08-10 PROCEDURE — 3078F PR MOST RECENT DIASTOLIC BLOOD PRESSURE < 80 MM HG: ICD-10-PCS | Mod: CPTII,S$GLB,, | Performed by: NURSE PRACTITIONER

## 2022-08-10 NOTE — PROGRESS NOTES
8/10/2022    Brad Nowak  Office Note    Chief Complaint   Patient presents with    3 month f/u     Patient said that he is feeling the same.       HPI:    8/10/2022- still trying to use CPAP, complaint of dry mouth nightly. Wearing oxygen at home, using [facility] for supplemental oxygen with problems with bills. Did not receive the heated tubing ordered in May.   Cough and SOB- stable, most days, mild cough, SOB limits activity level, stays inside due to hot weather.     5/10/2022- has CPAP tried wearing but it wakes him up with dry mouth after 1 hour, is sleeping with oxygen only. Already has humidity.   Cough is resolved with aerobika and nebulizer treatments 2x daily, Helps him clear more mucous.    SOB severe complaint, difficult to walk out side of home with out stopping to rest. SOB after showing, improves with rest, has supplemental oxygen at 3L with benefit.   On prednisone 10 mg daily with 20 mg as needed.     2/9/22- received new CPAP following recall of original in past 2 weeks, wearing nightly with benefit; does not have supplemental oxygen.    SOB- worsening with time, difficult to do any exertion with out stopping to rest. Using albuterol inhaler 2x weekly with little benefit, severe complaint, worse in very cold weather. Difficult to leave home due to SOB.   Using nebulizer as needed. States nebulizer does not help with breathing.     No weightloss, chest tightness, wheeze or  night sweating, occasional cough non productive.       8/9/2021- fell at home fractured ribs 7/25/21. States pain is recurrent, worse with movement. Tx with pain medication, 10 on 0-10 scale with movement. Improves with pain medication with no current pain.  SOB- unchanged. Daily, worse after taking shower and cutting grass, improves with rest, able to do activities of daily living. On prednisone 10 mg daily. Taking 20 mg one day weekly. Associated with occasional non productive cough.  No chest tightness or wheezing. Has  CPAP, not wearing due to CPAP causes dry mouth that is uncomfortable. States he breaths better with out it.     2/9/21- wearing CPAP nightly with some benefit. Did not receive the new mask as ordered. Mask is staying on all night. Waking up every night 1-2 x nightly around 1 or 2 pm.   SOB- unchanged, daily, can still perform tasks but has to move slowly, currently busy doing home repairs. Currently on Prednisone 10 mg daily with 20 mg once a week.   No cough, chest tightness, or wheeze. Using nebulizer 2x daily.        11/9/2020- states did not receive new mask from [facility], states when he wakes up mask is not correct on face and air is leaking.   Wearing CPAP nightly, states feels about the same.   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 On 5 mg prednisone daily. Using Albuterol 20 mg 2-3 x weekly.   Cough, chest tightness, wheeze- unchanged, daily complaints.     10/15/2020- received CPAP machine from [facility] on Sept 17th, states wearing every night between 9:30- 5 am. States his daytime drowsiness has worsened since starting CPAP, still trying to adjust to wearing CPAP full face mask. States not comfortable. Has air leaking from above nose.   Returned oxygen concentrator to [facility] due to it not working.   Cough- recurrent problem, unchanged, non productive, SOB- unchanged, using Trelegy daily, budesonide daily, albuterol daily, 5 mg prednisone. Has to stop outdoor chores due to SOB, no limits on indoor activities.       9/9/20- states oxygen concentrator is not working, has turned up to 5 L with very little air coming out of tubing. Titration study completed. Order sent for CPAP.   Wanting to change from [facility] to different [employer] company.     SOB- states felt better the morning after CPAP titration, worse with exertion. No improvement on supplemental oxygen, improves with rest.   Associated with chest tightness that improves with prednisone.   Using  budesonide daily with only slight improvement in SOB  Cough- recurrent problem, non productive, mild,       8/27/2020- had sleep study, positive results and waiting for Titration study.  No change in fatigue, SOB, daily, chronic complaint, using prednisone as needed on average 2-3 days weekly. Takes for Chest tightness.   Using nebulizer Budesonide 2 times daily with minimal improvement.  No cough, wearing supplemental oxygen at night.     7/15/2020- shortness of breath worsening slowly with time, states harder to breath following pneumonia May 2020 tx in [facility]. SOB severe and hinders his activity level, wearing oxygen only at night 5L. States it does not help much.   Complaint fatigue, daytime drowsiness, sleeping time varies, feeling drowsy in mornings, sleeps alone.  Cough- recurrent problem, occasionally productive light yellow mucous cream colored, no nocturnal arousals, no chest tightness or wheeze.  I05 therapy did not improve breathing, not currently on daily prednisone, using nebulizer as needed 2-3x daily. Trelegy    4/15/2020- SOB- unchanged, daily, worse with exertion, limiting daily activities and slows him down, decreased Prednisone 20 mg once tp twice a week. Has supplemental oxygen at 5L at night.     December 20, 2019- received oxygen concentrator, states does not improve SOB during day, SOB- stable, daily problem, worse with exertion, improves with nebulized Albuterol. Increased Prednisone himself to 10 mg in am and 40 mg nightly every night for last 3 weeks.      September 20, 2019- SOB worsening, using albuterol nebulizer 1-2 x daily every day. Currently on Prednisone 10 mg daily, with 20mg taper when needed. States having to breath out of mouth to exhale. Stopped Dupixent due to lack of benefit.     June 20, 2019- Dupixent injections started Feb 2019, pt states no benefit, Breathing has worsened. Exercise tolerance has decreased. Currently taking 10 mg prednisone daily, taken  prednisone 20 mg taper 5 x in 3 months.    03/20/2019-Breathing worsened in past month, on dupixent since January, Currently on Trelegy 1 puff daily and prednisone 10 mg daily every day.   No fever, no body aches, no pain, no wheeze, not able to do activities with out shortness of breath. Occasional non productive cough.     12/20/18- Breathing worsened, SOB with any exertion, not taking advair due to cost.  On long term prednisone therapy for uncontrolled severe persistent asthma. Eosinophil count artificially low due steroid use.    11/9/18- ER at [place] for chest pain, chest tightness, elevated pulse rate of 130, Discharged  Dx: COPD exacerbation, treated with albuterol nebulizer. States Advair cost to high, $183 month, States recently retired.   Using Duo neb 2x daily, 2-3 albuterol inhaler daily.    9/20/18- pt presents in clinic alone, states taking prednisone 10 mg daily and 20 mg daily twice a week when SOB, has taken 40 mg a day once last week. Uses rescue inhaler most days after climbing stairs at work. Has trouble sleeping. Discussed fasenra and pharmacy unable to contact patient. States he does not carry phone at work during day, does not answer numbers he does not know, and has no voicemail set up at moment. States using Advair Diskus daily. Cough every few days, non productive. Worse at night.      July 5, 2018- on prednisone 5/d with 20/d up to 3 weekly.  Was in hospital December.  Breathing worse, saw asbestos  and advised 2nd opinion, to see Dr Rodriguez soon.  No fasenra as pharmacy unable to contact pt. Pt ues prednisone 5/d and not using trelegy- has one puff left on 14 day sample given in April and relates trelegy not seems to help.  April 5 - increase prednisone from 5/d to 20/d since last yr - breathing still poor.  Rest good - but not better on prednisone 20/d.  No big improvement on incruse. Sinus better on flonase.     12/28/2017 pt very active, bikes to work with tool boxes  with no problem. Had had hospital stays every 6 months til started prednisone continiously  5 yrs ago.  Pt recently flared with recent NSR stay. Pt had pft with fev1 50% at 1.53 liter 12/15/2017.  Very mahmood still.  On breo and singulair.  Use spiriva in past.     From consult last month:Plan:   Pt has eosinophillic asthma and copd.  Pt should respond to singulair.  Higher dose steroid action plan needed.  He would likely do very well with il5 rx.     outpt soon on prednisone 60/d x 7 with taper, going to breo 200, and singulair would be good with current rx.  Would not strop prednisone.  outpt f/u recommended. I do no see asbestos complications.  History of Present Illness:  Cc is sob x 3 days.   Pt worked refinery as .  Stopped smoking yrs ago. FH + asthma with no prior h/o asthma.  Has had cough and wheezes and sob going back prior to [place].  Pt noted needed freq steroids so was kept on prednisone 5 mg daily with need to increase dose ever 2-3 months because of increase sob.  Pt uses breo 100 and xopenex works better than ventolin.  Pt has chr nasal drainage but no bad infections.  abx not used.  He denies spiriva helped in past and denies using daliresp.    Pt rides bike to work - bike weighs 300 lbs with tools for trade.  Pt very active and still works - lives with daughter.    Reviewed note    HPI:      8/21/2018 - Pt with known COPD, asthma (sees Dr Eddy) here for second opinion regarding asbestos lung disease.  Started working on [employer] in 1968.  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  He started working as a  in 1987 until present time and reports that he has had asbestos exposure.  He denies working directly with asbestos but did work in the vicinity.  He reports that Dr Eddy thinks he has asbestos issues.  Has had PFT but doesn't remember when.  He  "has chronic SOB, SHOEMAKER (being addressed by Dr Eddy)  He only uses prn inhalers.  Has some chronic cough and wheezing.  Feels that SOB is worse but he rides bike around at work (weights about 200#).        The chief compliant  problem varies with instablilty at time        PFSH:  Past Medical History:   Diagnosis Date    COPD (chronic obstructive pulmonary disease)     Hyperlipidemia     Hypertension     Obstructive sleep apnea          Past Surgical History:   Procedure Laterality Date    INGUINAL HERNIA REPAIR Left     SHOULDER ARTHROSCOPY Right     WRIST SURGERY Left      Social History     Tobacco Use    Smoking status: Former Smoker     Packs/day: 2.00     Years: 40.00     Pack years: 80.00     Quit date: 3/24/2009     Years since quittin.3    Smokeless tobacco: Never Used   Substance Use Topics    Alcohol use: Yes     Comment: 6-12 beers/night; some days none.    Drug use: No     Family History   Problem Relation Age of Onset    Cancer Mother     Asthma Mother     Heart disease Father     COPD Father     Cancer Father     Cancer Brother         colon cancer    No Known Problems Brother     No Known Problems Brother      Review of patient's allergies indicates:  No Known Allergies  I have reviewed past medical, family, and social history. I have reviewed previous nurse notes.    Performance Status:The patient's activity level is household activities.       Review of Systems:  a review of eleven systems covering constitutional, Eye, HEENT, Psych, Respiratory, Cardiac, GI, , Musculoskeletal, Endocrine, Dermatologic was negative except for pertinent findings as listed ABOVE and below: pertinent positive as above, rest is good  Fatigue, shortness of breath,   cough     Exam:Comprehensive exam done. BP (!) 144/71 (BP Location: Left arm, Patient Position: Sitting, BP Method: Medium (Automatic))   Pulse 70   Ht 5' 7" (1.702 m)   Wt 87.8 kg (193 lb 7.3 oz)   SpO2 95% Comment: room air at rest "  BMI 30.30 kg/m²   Exam included Vitals as listed, and patient's appearance and affect and alertness and mood, oral exam for yeast and hygiene and pharynx lesions and Mallapatti (M) score, neck with inspection for jvd and masses and thyroid abnormalities and lymph nodes (supraclavicular and infraclavicular nodes and axillary also examined and noted if abn), chest exam included symmetry and effort and fremitus and percussion and auscultation, cardiac exam included rhythm and gallops and murmur and rubs and jvd and edema, abdominal exam for mass and hepatosplenomegaly and tenderness and hernias and bowel sounds, Musculoskeletal exam with muscle tone and posture and mobility/gait and  strength, and skin for rashes and cyanosis and pallor and turgor, extremity for clubbing.  Findings were normal except for pertinent findings listed below: M2; BS diminished bilaterally         Radiographs (ct chest and cxr) reviewed: results reviewed by direct vision  CT chest 02/15/2022   1. Pulmonary emphysema and coronary artery disease.  2. Left pulmonary nodule. In a low risk patient, no further follow-up is recommended.  In a high-risk patient, 12 month follow-up CT could be considered.      CTA Chest Non-Coronary 05/04/2020   Limited CT pulmonary angiogram without evidence for definite filling defects within the adequately opacified portions of the pulmonary arteries.  Airspace consolidation in the right lung base and to a lesser extent in the left lung base may represent areas of pneumonia.  Thickening of the wall of the mid to distal esophagus.  This could be further assessed with upper endoscopy or an esophagram to evaluate the clinical significance of this finding.  Mildly prominent lymph nodes in the subcarinal region.  Diffuse fatty change throughout the liver.  Emphysematous changes in the lungs bilaterally.    X-Ray Chest AP Portable 05/04/2020   The heart size is within normal limits.  There are calcifications in the  "aortic arch.  There are linear foci of atelectasis or parenchymal scarring in the lung bases bilaterally.  There are no pleural effusions.  The osseous structures are intact.    CT Chest Without Contrast 12/20/2019   Emphysema.  Atherosclerotic calcification of the aorta, origins of the right and left subclavian arteries and coronary arteries.    CT Chest Without Contrast 07/09/2019  Viewed by direct vision, poor quality images 74 images total not a sufficient study.  The lungs are hyperexpanded with bullous emphysematous changes seen in the upper lobes.  The lower lobes demonstrate prominent interlobar septa.  There are few patchy" bud in tree "appearance seen in the left lower lobe.  There are no pulmonary nodules or suspicious masses.  A small bilateral pleural effusion is present which could be physiologic.    The heart is normal in size however demonstrates coronary artery calcifications.  The tracheobronchial airway is slightly dilated.  There is shotty lymph nodes noted of the mediastinum.  Note is made of calcifications of the thoracic aorta.     XR CHEST 1 VIEW 03/20/2019   Negative chest x-ray     2D ECHO WITH COLOR FLOW DOPPLER 12/15/2017   3 - Normal left ventricular systolic function (EF 65-70%).     4 - Impaired LV relaxation, normal LAP (grade 1 diastolic dysfunction).       Labs reviewed       Lab Results   Component Value Date    WBC 7.50 05/06/2020    RBC 3.83 (L) 05/06/2020    HGB 12.0 (L) 05/06/2020    HCT 35.0 (L) 05/06/2020    MCV 91 05/06/2020    MCH 31.3 (H) 05/06/2020    MCHC 34.3 05/06/2020    RDW 14.4 05/06/2020     05/06/2020    MPV 7.9 (L) 05/06/2020    GRAN 6.0 05/06/2020    GRAN 79.3 (H) 05/06/2020    LYMPH 0.7 (L) 05/06/2020    LYMPH 9.6 (L) 05/06/2020    MONO 0.6 05/06/2020    MONO 8.2 05/06/2020    EOS 0.2 05/06/2020    BASO 0.10 05/06/2020    EOSINOPHIL 2.1 05/06/2020    BASOPHIL 0.8 05/06/2020     Results for MADELEINE EVANS (MRN [id number]) as of 12/20/2018 14:08   Ref. Range " 12/11/2017 05:14   Eos # Latest Ref Range: 0.0 - 0.5 K/uL 0.5       PFT results reviewed  Pulmonary Functions Testing Results:  PFT results reviewed fev1 50 % at 1.53 with no bd response 12/15/2017      Spirometry bronchodilator, lung volume by gas dilution, diffusion capacity measured July 9, 2019.  The FEV1 to FVC ratio was only 54%, this indicates airflow obstruction.  The FEV1 measured 59% predicted at 1.75 L.  The of 5% improvement in the FEV1   following bronchodilator was not statistically significant, 12% seem to for statistical significance.  Total lung capacity was normal.  Diffusion was low, uncorrected for anemia present, at 61%.     There is  severe airflow obstruction, no bronchodilator response, no restriction, and loss of diffusion measured.  Clinical correlation recommended.     6 min walk study was done February 9, 2021. Baseline room air saturation was 96%. With ambulation O2 sat felt to 92% but no lower. Patient walked about 86% of the reference distance of 425 m.       EPWORTH SLEEPINESS SCALE SCORE 16 on 7/15/2020  Sleep study 8/4/2020= AHI 23.4    6 minute walk study was accomplished February 9, 2022. Baseline room air saturation was 91 percent. With ambulation O2 sat fell to 88 percent by 5 minutes. Patient need 2 liters of oxygen maintain O2 sat in the 90s. At the end of the walk on 2 liters of   oxygen O2 sat was 97 percent. Patient walked 70 percent of the reference distance at 340 meters.       Plan:  Clinical impression is resonably certain and repeated evaluation prn +/- follow up will be needed as below.    Brad was seen today for 3 month f/u.    Diagnoses and all orders for this visit:    Steroid-dependent chronic obstructive pulmonary disease    Chronic obstructive pulmonary disease, unspecified COPD type    Obstructive sleep apnea syndrome        Follow up in about 3 months (around 11/10/2022), or if symptoms worsen or fail to improve.    Discussed with patient above for education  the following:      Patient Instructions   Continue current COPD medication regiment    Will contact DME company and see why heated tubing was not delivered last May    Continue oxygen therapy

## 2022-08-10 NOTE — PATIENT INSTRUCTIONS
Continue current COPD medication regiment    Will contact [employer] company and see why heated tubing was not delivered last May    Continue oxygen therapy

## 2022-09-24 ENCOUNTER — IMMUNIZATION (OUTPATIENT)
Dept: PRIMARY CARE CLINIC | Facility: CLINIC | Age: 72
End: 2022-09-24
Payer: MEDICARE

## 2022-09-24 PROCEDURE — 90694 VACC AIIV4 NO PRSRV 0.5ML IM: CPT | Mod: S$GLB,,, | Performed by: NURSE PRACTITIONER

## 2022-09-24 PROCEDURE — G0008 ADMIN INFLUENZA VIRUS VAC: HCPCS | Mod: S$GLB,,, | Performed by: NURSE PRACTITIONER

## 2022-09-24 PROCEDURE — G0008 FLU VACCINE - QUADRIVALENT - ADJUVANTED: ICD-10-PCS | Mod: S$GLB,,, | Performed by: NURSE PRACTITIONER

## 2022-09-24 PROCEDURE — 90694 FLU VACCINE - QUADRIVALENT - ADJUVANTED: ICD-10-PCS | Mod: S$GLB,,, | Performed by: NURSE PRACTITIONER

## 2022-09-27 ENCOUNTER — PATIENT MESSAGE (OUTPATIENT)
Dept: PRIMARY CARE CLINIC | Facility: CLINIC | Age: 72
End: 2022-09-27
Payer: MEDICARE

## 2022-10-27 DIAGNOSIS — Z92.241 STEROID-DEPENDENT CHRONIC OBSTRUCTIVE PULMONARY DISEASE: ICD-10-CM

## 2022-10-27 DIAGNOSIS — J44.9 STEROID-DEPENDENT CHRONIC OBSTRUCTIVE PULMONARY DISEASE: ICD-10-CM

## 2022-10-27 DIAGNOSIS — J43.9 PULMONARY EMPHYSEMA, UNSPECIFIED EMPHYSEMA TYPE: ICD-10-CM

## 2022-10-27 DIAGNOSIS — Z79.52 CURRENT CHRONIC USE OF SYSTEMIC STEROIDS: ICD-10-CM

## 2022-10-27 RX ORDER — PREDNISONE 10 MG/1
10 TABLET ORAL DAILY
Qty: 90 TABLET | Refills: 1 | OUTPATIENT
Start: 2022-10-27 | End: 2023-01-25

## 2022-10-27 RX ORDER — PREDNISONE 20 MG/1
TABLET ORAL
Qty: 21 TABLET | Refills: 2 | Status: SHIPPED | OUTPATIENT
Start: 2022-10-27 | End: 2023-09-05 | Stop reason: SDUPTHER

## 2022-12-16 DIAGNOSIS — J44.9 STEROID-DEPENDENT CHRONIC OBSTRUCTIVE PULMONARY DISEASE: ICD-10-CM

## 2022-12-16 DIAGNOSIS — J43.9 PULMONARY EMPHYSEMA, UNSPECIFIED EMPHYSEMA TYPE: ICD-10-CM

## 2022-12-16 DIAGNOSIS — Z92.241 STEROID-DEPENDENT CHRONIC OBSTRUCTIVE PULMONARY DISEASE: ICD-10-CM

## 2022-12-16 DIAGNOSIS — Z79.52 CURRENT CHRONIC USE OF SYSTEMIC STEROIDS: ICD-10-CM

## 2022-12-16 RX ORDER — PREDNISONE 10 MG/1
10 TABLET ORAL DAILY
Qty: 90 TABLET | Refills: 1 | Status: SHIPPED | OUTPATIENT
Start: 2022-12-16 | End: 2023-06-14

## 2023-02-08 ENCOUNTER — OFFICE VISIT (OUTPATIENT)
Dept: PODIATRY | Facility: CLINIC | Age: 73
End: 2023-02-08
Payer: MEDICARE

## 2023-02-08 VITALS
SYSTOLIC BLOOD PRESSURE: 136 MMHG | HEIGHT: 67 IN | HEART RATE: 65 BPM | WEIGHT: 187.5 LBS | DIASTOLIC BLOOD PRESSURE: 69 MMHG | RESPIRATION RATE: 16 BRPM | BODY MASS INDEX: 29.43 KG/M2

## 2023-02-08 DIAGNOSIS — M25.472 PAIN AND SWELLING OF LEFT ANKLE: Primary | ICD-10-CM

## 2023-02-08 DIAGNOSIS — M76.62 ACHILLES TENDINITIS OF LEFT LOWER EXTREMITY: ICD-10-CM

## 2023-02-08 DIAGNOSIS — R25.2 CRAMP IN MUSCLE: ICD-10-CM

## 2023-02-08 DIAGNOSIS — M25.572 PAIN AND SWELLING OF LEFT ANKLE: Primary | ICD-10-CM

## 2023-02-08 DIAGNOSIS — S89.92XS: ICD-10-CM

## 2023-02-08 PROCEDURE — 3288F FALL RISK ASSESSMENT DOCD: CPT | Mod: CPTII,S$GLB,, | Performed by: PODIATRIST

## 2023-02-08 PROCEDURE — 3075F PR MOST RECENT SYSTOLIC BLOOD PRESS GE 130-139MM HG: ICD-10-PCS | Mod: CPTII,S$GLB,, | Performed by: PODIATRIST

## 2023-02-08 PROCEDURE — 1126F AMNT PAIN NOTED NONE PRSNT: CPT | Mod: CPTII,S$GLB,, | Performed by: PODIATRIST

## 2023-02-08 PROCEDURE — 99999 PR PBB SHADOW E&M-EST. PATIENT-LVL V: ICD-10-PCS | Mod: PBBFAC,,, | Performed by: PODIATRIST

## 2023-02-08 PROCEDURE — 1159F MED LIST DOCD IN RCRD: CPT | Mod: CPTII,S$GLB,, | Performed by: PODIATRIST

## 2023-02-08 PROCEDURE — 3078F PR MOST RECENT DIASTOLIC BLOOD PRESSURE < 80 MM HG: ICD-10-PCS | Mod: CPTII,S$GLB,, | Performed by: PODIATRIST

## 2023-02-08 PROCEDURE — 1159F PR MEDICATION LIST DOCUMENTED IN MEDICAL RECORD: ICD-10-PCS | Mod: CPTII,S$GLB,, | Performed by: PODIATRIST

## 2023-02-08 PROCEDURE — 1126F PR PAIN SEVERITY QUANTIFIED, NO PAIN PRESENT: ICD-10-PCS | Mod: CPTII,S$GLB,, | Performed by: PODIATRIST

## 2023-02-08 PROCEDURE — 3008F PR BODY MASS INDEX (BMI) DOCUMENTED: ICD-10-PCS | Mod: CPTII,S$GLB,, | Performed by: PODIATRIST

## 2023-02-08 PROCEDURE — 1101F PT FALLS ASSESS-DOCD LE1/YR: CPT | Mod: CPTII,S$GLB,, | Performed by: PODIATRIST

## 2023-02-08 PROCEDURE — 1160F RVW MEDS BY RX/DR IN RCRD: CPT | Mod: CPTII,S$GLB,, | Performed by: PODIATRIST

## 2023-02-08 PROCEDURE — 99204 PR OFFICE/OUTPT VISIT, NEW, LEVL IV, 45-59 MIN: ICD-10-PCS | Mod: S$GLB,,, | Performed by: PODIATRIST

## 2023-02-08 PROCEDURE — 1160F PR REVIEW ALL MEDS BY PRESCRIBER/CLIN PHARMACIST DOCUMENTED: ICD-10-PCS | Mod: CPTII,S$GLB,, | Performed by: PODIATRIST

## 2023-02-08 PROCEDURE — 3075F SYST BP GE 130 - 139MM HG: CPT | Mod: CPTII,S$GLB,, | Performed by: PODIATRIST

## 2023-02-08 PROCEDURE — 1101F PR PT FALLS ASSESS DOC 0-1 FALLS W/OUT INJ PAST YR: ICD-10-PCS | Mod: CPTII,S$GLB,, | Performed by: PODIATRIST

## 2023-02-08 PROCEDURE — 3078F DIAST BP <80 MM HG: CPT | Mod: CPTII,S$GLB,, | Performed by: PODIATRIST

## 2023-02-08 PROCEDURE — 3288F PR FALLS RISK ASSESSMENT DOCUMENTED: ICD-10-PCS | Mod: CPTII,S$GLB,, | Performed by: PODIATRIST

## 2023-02-08 PROCEDURE — 99204 OFFICE O/P NEW MOD 45 MIN: CPT | Mod: S$GLB,,, | Performed by: PODIATRIST

## 2023-02-08 PROCEDURE — 3008F BODY MASS INDEX DOCD: CPT | Mod: CPTII,S$GLB,, | Performed by: PODIATRIST

## 2023-02-08 PROCEDURE — 99999 PR PBB SHADOW E&M-EST. PATIENT-LVL V: CPT | Mod: PBBFAC,,, | Performed by: PODIATRIST

## 2023-02-08 RX ORDER — DICLOFENAC SODIUM 10 MG/G
2 GEL TOPICAL 4 TIMES DAILY
Qty: 350 G | Refills: 2 | Status: SHIPPED | OUTPATIENT
Start: 2023-02-08 | End: 2023-12-27

## 2023-02-08 NOTE — PROGRESS NOTES
"Subjective:      Patient ID: Brad Nowak is a 72 y.o. male.    Chief Complaint: Ankle Injury    Brad is a 72 y.o. male who presents new to the podiatry clinic, on referral from PCP, w/ c/o L ankle pain & swelling. Onset of the symptoms was Xmas rainer. States he cut his L leg on side and noticed redness as well swelling & temperature; presented to the ED was placed antibiotics for cellulitis. Advised to follow up with his PCP. There was not much improvement with the 1st round of antibiotics and subsequently took 2 additional rounds per his PCP about a week apart.  States that, there is some improvement of the swelling but still having pain waking him up at night, indicating the lateral ankle since about the end of this past month. Ocassional Achilles pain. States he gets "Charley horse" calf as well but affecting his R mostly, including the calf, elbow & knee on a fairly frequent basis as well.  Symptoms have waxed and waned but are better overall.  Just concerned because has been >6 weeks.  He did have an x-ray taken 01/26/2023, ordered by his PCP, concerning for possible fracture.    PCP: Newton Corley MD 12/27/22    Past Medical History:   Diagnosis Date    COPD (chronic obstructive pulmonary disease)     Hyperlipidemia     Hypertension     Obstructive sleep apnea       Patient Active Problem List   Diagnosis    COPD (chronic obstructive pulmonary disease)    Abdominal aortic atherosclerosis    Steroid-dependent chronic obstructive pulmonary disease    History of prior cigarette smoking    Current chronic use of systemic steroids    Eosinophilic asthma    Chronic sinus complaints    Asbestos exposure    Severe persistent asthma without complication    Aortic atherosclerosis    Hypertension    Hyperlipidemia    Community acquired pneumonia    Chronic respiratory failure with hypoxia    Sleep apnea    Interstitial pulmonary disease, unspecified       X-Ray Ankle Complete 3 View Left  Narrative: EXAMINATION:  XR ANKLE " COMPLETE 3 VIEW LEFT    CLINICAL HISTORY:  Pain in unspecified ankle and joints of unspecified foot    TECHNIQUE:  AP, lateral and oblique views of the left ankle were performed.    COMPARISON:  None.    FINDINGS:  Osseous mineralization is preserved.  Linear high attenuation focus adjacent to the distal aspect of the lateral malleolus/lateral talus.  No suspicious lytic or blastic lesions.  Tibial plafond and talar dome are intact.  Ankle mortise is symmetric.  Tibiotalar, subtalar and mid tarsal cartilage spaces are congruent.  Distal Achilles enthesophyte.  Abundant vascular calcifications.  There is soft tissue swelling about the lower leg and ankle.  Impression: 1. Faint linear high attenuation focus adjacent to the distal aspect of the lateral malleolus/lateral talus, possibly vascular in nature.  A small avulsion fracture is possible and cannot be excluded.  2. Soft tissue swelling throughout the lower leg and ankle, nonspecific.    Electronically signed by: Reji Heaton MD  Date:    01/26/2023  Time:    12:13  I independently reviewed the x-ray images.    Objective:      Review of Systems   Constitutional: Negative for chills, fever and malaise/fatigue.   Cardiovascular:  Positive for leg swelling. Negative for claudication.   Skin:  Positive for dry skin. Negative for color change, itching, poor wound healing and suspicious lesions.   Musculoskeletal:  Positive for joint pain and muscle cramps. Negative for arthritis, falls, joint swelling, muscle weakness and myalgias.   Neurological:  Negative for focal weakness, numbness, paresthesias, sensory change and weakness.   Psychiatric/Behavioral:  The patient is nervous/anxious.    Physical Exam  Vitals reviewed.   Constitutional:       General: He is not in acute distress.     Appearance: He is well-developed and overweight.   Cardiovascular:      Pulses:           Dorsalis pedis pulses are 1+ on the right side and 1+ on the left side.   Musculoskeletal:          General: No swelling or tenderness.      Right lower leg: Edema present.      Left lower leg: Edema present.      Right ankle: No tenderness. Normal range of motion.      Right Achilles Tendon: No tenderness or defects. Villa's test negative.      Left ankle: No tenderness. Normal range of motion.      Left Achilles Tendon: No tenderness or defects. Villa's test negative.      Comments: L>RLE likely consistent venous insufficiency.    Feet:      Right foot:      Skin integrity: Skin integrity normal. No erythema.      Left foot:      Skin integrity: Skin integrity normal. No erythema.      Comments: MS strength of extrinsics to foot and ankle B/L + 5/5 in DF/PF/Inv/Ev to resistance with no reproduction of pain in any direction.   No reproducible discomfort to palpation distal extent of L Achilles insertion to calcaneus; no POP Achilles tendon or gastroc soleal complex.    Passive ROM of ankle and pedal joints is painless and without crepitation B/L.  TTP proximal STJ/ anterior distal lateral AJ L w/ no pain on ROM.  Skin:     Capillary Refill: Capillary refill takes less than 2 seconds.      Findings: Abrasion (Healed anterior lower leg lateral L w/ NSI) present. No bruising, erythema, lesion or rash.   Neurological:      Mental Status: He is alert and oriented to person, place, and time.      Sensory: No sensory deficit.      Motor: No weakness.      Gait: Gait normal.   Psychiatric:         Mood and Affect: Affect normal. Mood is anxious.         Behavior: Behavior normal. Behavior is cooperative.       Assessment:      Encounter Diagnoses   Name Primary?    Pain and swelling of left ankle Yes    Cramp in muscle     Injury of left lower extremity, initial encounter     Achilles tendinitis of left lower extremity       1. Pain and swelling of left ankle  diclofenac sodium (VOLTAREN) 1 % Gel      2. Cramp in muscle        3. Injury of left lower extremity, initial encounter        4. Achilles tendinitis  of left lower extremity           Plan:       Brad was seen today for ankle injury.    Diagnoses and all orders for this visit:    Pain and swelling of left ankle  -     diclofenac sodium (VOLTAREN) 1 % Gel; Apply 2 g topically 4 (four) times daily.    Cramp in muscle    Injury of left lower extremity, initial encounter    Achilles tendinitis of left lower extremity    I counseled the patient on his conditions, their implications and medical management.    - Shoe inspection. Patient instructed on proper supportive shoe gear at all times WB including home.    Advised on hydration.  (discuss with PCP cramping elbow and knee p.r.n.)    Compression stockings for BLE edema.    Re-evaluation in 1 month, sooner p.r.n.

## 2023-03-07 DIAGNOSIS — J44.9 STEROID-DEPENDENT CHRONIC OBSTRUCTIVE PULMONARY DISEASE: ICD-10-CM

## 2023-03-07 DIAGNOSIS — Z92.241 STEROID-DEPENDENT CHRONIC OBSTRUCTIVE PULMONARY DISEASE: ICD-10-CM

## 2023-03-07 DIAGNOSIS — J43.1 PANLOBULAR EMPHYSEMA: ICD-10-CM

## 2023-03-07 DIAGNOSIS — J43.9 PULMONARY EMPHYSEMA, UNSPECIFIED EMPHYSEMA TYPE: ICD-10-CM

## 2023-03-07 DIAGNOSIS — J44.9 CHRONIC OBSTRUCTIVE PULMONARY DISEASE, UNSPECIFIED COPD TYPE: ICD-10-CM

## 2023-03-08 RX ORDER — ALBUTEROL SULFATE 90 UG/1
2 AEROSOL, METERED RESPIRATORY (INHALATION) EVERY 4 HOURS PRN
Qty: 18 G | Refills: 2 | Status: SHIPPED | OUTPATIENT
Start: 2023-03-08

## 2023-03-08 RX ORDER — BUDESONIDE, GLYCOPYRROLATE, AND FORMOTEROL FUMARATE 160; 9; 4.8 UG/1; UG/1; UG/1
2 AEROSOL, METERED RESPIRATORY (INHALATION) 2 TIMES DAILY
Qty: 10.7 G | Refills: 2 | Status: SHIPPED | OUTPATIENT
Start: 2023-03-08 | End: 2023-12-21 | Stop reason: SDUPTHER

## 2023-03-13 ENCOUNTER — OFFICE VISIT (OUTPATIENT)
Dept: PODIATRY | Facility: CLINIC | Age: 73
End: 2023-03-13
Payer: MEDICARE

## 2023-03-13 VITALS
DIASTOLIC BLOOD PRESSURE: 74 MMHG | BODY MASS INDEX: 29.35 KG/M2 | SYSTOLIC BLOOD PRESSURE: 168 MMHG | HEART RATE: 82 BPM | WEIGHT: 187 LBS | HEIGHT: 67 IN

## 2023-03-13 DIAGNOSIS — M25.572 PAIN IN JOINT OF LEFT FOOT: ICD-10-CM

## 2023-03-13 DIAGNOSIS — R25.2 CRAMP IN MUSCLE: ICD-10-CM

## 2023-03-13 DIAGNOSIS — S80.812A ABRASION OF LEFT LEG, INITIAL ENCOUNTER: ICD-10-CM

## 2023-03-13 DIAGNOSIS — M76.62 ACHILLES TENDINITIS OF LEFT LOWER EXTREMITY: Primary | ICD-10-CM

## 2023-03-13 PROCEDURE — 3008F PR BODY MASS INDEX (BMI) DOCUMENTED: ICD-10-PCS | Mod: CPTII,S$GLB,, | Performed by: PODIATRIST

## 2023-03-13 PROCEDURE — 99214 PR OFFICE/OUTPT VISIT, EST, LEVL IV, 30-39 MIN: ICD-10-PCS | Mod: S$GLB,,, | Performed by: PODIATRIST

## 2023-03-13 PROCEDURE — 3008F BODY MASS INDEX DOCD: CPT | Mod: CPTII,S$GLB,, | Performed by: PODIATRIST

## 2023-03-13 PROCEDURE — 99214 OFFICE O/P EST MOD 30 MIN: CPT | Mod: S$GLB,,, | Performed by: PODIATRIST

## 2023-03-13 PROCEDURE — 3078F DIAST BP <80 MM HG: CPT | Mod: CPTII,S$GLB,, | Performed by: PODIATRIST

## 2023-03-13 PROCEDURE — 3077F SYST BP >= 140 MM HG: CPT | Mod: CPTII,S$GLB,, | Performed by: PODIATRIST

## 2023-03-13 PROCEDURE — 1159F PR MEDICATION LIST DOCUMENTED IN MEDICAL RECORD: ICD-10-PCS | Mod: CPTII,S$GLB,, | Performed by: PODIATRIST

## 2023-03-13 PROCEDURE — 3077F PR MOST RECENT SYSTOLIC BLOOD PRESSURE >= 140 MM HG: ICD-10-PCS | Mod: CPTII,S$GLB,, | Performed by: PODIATRIST

## 2023-03-13 PROCEDURE — 3078F PR MOST RECENT DIASTOLIC BLOOD PRESSURE < 80 MM HG: ICD-10-PCS | Mod: CPTII,S$GLB,, | Performed by: PODIATRIST

## 2023-03-13 PROCEDURE — 99999 PR PBB SHADOW E&M-EST. PATIENT-LVL IV: ICD-10-PCS | Mod: PBBFAC,,, | Performed by: PODIATRIST

## 2023-03-13 PROCEDURE — 1159F MED LIST DOCD IN RCRD: CPT | Mod: CPTII,S$GLB,, | Performed by: PODIATRIST

## 2023-03-13 PROCEDURE — 4010F PR ACE/ARB THEARPY RXD/TAKEN: ICD-10-PCS | Mod: CPTII,S$GLB,, | Performed by: PODIATRIST

## 2023-03-13 PROCEDURE — 4010F ACE/ARB THERAPY RXD/TAKEN: CPT | Mod: CPTII,S$GLB,, | Performed by: PODIATRIST

## 2023-03-13 PROCEDURE — 99999 PR PBB SHADOW E&M-EST. PATIENT-LVL IV: CPT | Mod: PBBFAC,,, | Performed by: PODIATRIST

## 2023-03-13 NOTE — PROGRESS NOTES
"Subjective:      Patient ID: Brad Nowak is a 73 y.o. male.    Chief Complaint: No chief complaint on file.    Brad is a 73 y.o. male who presented new to the podiatry clinic, on referral from PCP, about a month ago w/ c/o L ankle pain & swelling since Xmas rainer. Cut L leg & noticed redness as well as swelling w/  temperature; ED gave him antibiotics for cellulitis. Took 2 additional rounds per his PCP about a week apart.  Abrasion L leg healed. Some improvement of swelling but pain waking him up @ night, lateral ankle. X-ray taken 01/26/2023, R/O possible fracture lateral tip malleolus.  Secondarily, ocassional Achilles pain distal insertion. "Charley horse" in calf, R mostly.   Patient was advised on use of topical NSAID ,appropriate supportive shoes, hydration & compression stockings.  States he is very good, having no pain to heel or ankle L.    PCP: Newton Corley MD 12/27/22    Past Medical History:   Diagnosis Date    COPD (chronic obstructive pulmonary disease)     Hyperlipidemia     Hypertension     Obstructive sleep apnea       Patient Active Problem List   Diagnosis    COPD (chronic obstructive pulmonary disease)    Abdominal aortic atherosclerosis    Steroid-dependent chronic obstructive pulmonary disease    History of prior cigarette smoking    Current chronic use of systemic steroids    Eosinophilic asthma    Chronic sinus complaints    Asbestos exposure    Severe persistent asthma without complication    Aortic atherosclerosis    Hypertension    Hyperlipidemia    Community acquired pneumonia    Chronic respiratory failure with hypoxia    Sleep apnea    Interstitial pulmonary disease, unspecified       X-Ray Ankle Complete 3 View Left  Narrative: EXAMINATION:  XR ANKLE COMPLETE 3 VIEW LEFT    CLINICAL HISTORY:  Pain in unspecified ankle and joints of unspecified foot    TECHNIQUE:  AP, lateral and oblique views of the left ankle were performed.    COMPARISON:  None.    FINDINGS:  Osseous " mineralization is preserved.  Linear high attenuation focus adjacent to the distal aspect of the lateral malleolus/lateral talus.  No suspicious lytic or blastic lesions.  Tibial plafond and talar dome are intact.  Ankle mortise is symmetric.  Tibiotalar, subtalar and mid tarsal cartilage spaces are congruent.  Distal Achilles enthesophyte.  Abundant vascular calcifications.  There is soft tissue swelling about the lower leg and ankle.  Impression: 1. Faint linear high attenuation focus adjacent to the distal aspect of the lateral malleolus/lateral talus, possibly vascular in nature.  A small avulsion fracture is possible and cannot be excluded.  2. Soft tissue swelling throughout the lower leg and ankle, nonspecific.    Electronically signed by: Reji Heaton MD  Date:    01/26/2023  Time:    12:13  I independently reviewed the x-ray images.    Objective:      Physical Exam  Vitals reviewed.   Constitutional:       Appearance: He is well-developed and overweight.   Cardiovascular:      Pulses:           Dorsalis pedis pulses are 1+ on the right side and 1+ on the left side.   Musculoskeletal:         General: No swelling or tenderness.      Right lower leg: Edema present.      Left lower leg: Edema present.      Right ankle: No tenderness. Normal range of motion.      Right Achilles Tendon: No tenderness or defects. Villa's test negative.      Left ankle: No tenderness. Normal range of motion.      Left Achilles Tendon: No tenderness or defects. Villa's test negative.      Comments: L>RLE likely consistent venous insufficiency.    Feet:      Right foot:      Skin integrity: Skin integrity normal. No erythema.      Left foot:      Skin integrity: Skin integrity normal. No erythema.      Comments: MS strength of extrinsics to foot and ankle B/L + 5/5 in DF/PF/Inv/Ev to resistance w/ no reproduction of pain in any direction.   No reproducible TTP distal L Achilles insertion to calcaneus; no POP gastroc soleal  complex.    Passive ROM of ankle & pedal joints is painless & w/out crepitation B/L.  No remaining TTP proximal STJ/ anterior lateral mortise AJ L.  Skin:     Capillary Refill: Capillary refill takes less than 2 seconds.      Findings: Abrasion (Healed anterior lower leg lateral L w/ NSI; new abrasion anterior medial left leg from powerwashing) present. No bruising, erythema, lesion or rash.   Neurological:      Mental Status: He is alert and oriented to person, place, and time.      Motor: No weakness.      Gait: Gait normal.   Psychiatric:         Mood and Affect: Affect normal. Mood is not anxious.         Behavior: Behavior normal. Behavior is cooperative.       Assessment:      Encounter Diagnoses   Name Primary?    Achilles tendinitis of left lower extremity Yes    Pain in joint of left foot     Cramp in muscle     Abrasion of left leg, initial encounter       1. Achilles tendinitis of left lower extremity        2. Pain in joint of left foot        3. Cramp in muscle        4. Abrasion of left leg, initial encounter           Plan:       Diagnoses and all orders for this visit:    Achilles tendinitis of left lower extremity    Pain in joint of left foot    Cramp in muscle    Abrasion of left leg, initial encounter    I counseled the patient on his conditions, their implications and medical management.    - Shoe inspection. Patient instructed on continue use proper supportive shoe gear at all times WB, including home.    Continue compression stockings - Tubigrip size C dispensed (decreased from size D)    Re-evaluation p.r.n.

## 2023-05-02 ENCOUNTER — OFFICE VISIT (OUTPATIENT)
Dept: CARDIOLOGY | Facility: CLINIC | Age: 73
End: 2023-05-02
Payer: MEDICARE

## 2023-05-02 VITALS
WEIGHT: 185.44 LBS | OXYGEN SATURATION: 97 % | HEART RATE: 77 BPM | BODY MASS INDEX: 29.1 KG/M2 | DIASTOLIC BLOOD PRESSURE: 68 MMHG | SYSTOLIC BLOOD PRESSURE: 160 MMHG | HEIGHT: 67 IN

## 2023-05-02 DIAGNOSIS — I34.1 MITRAL VALVE PROLAPSE: ICD-10-CM

## 2023-05-02 DIAGNOSIS — I25.10 ATHEROSCLEROSIS OF NATIVE CORONARY ARTERY OF NATIVE HEART WITHOUT ANGINA PECTORIS: ICD-10-CM

## 2023-05-02 DIAGNOSIS — I70.0 AORTIC ATHEROSCLEROSIS: Primary | ICD-10-CM

## 2023-05-02 DIAGNOSIS — R06.09 DOE (DYSPNEA ON EXERTION): ICD-10-CM

## 2023-05-02 DIAGNOSIS — E78.5 HYPERLIPIDEMIA, UNSPECIFIED HYPERLIPIDEMIA TYPE: ICD-10-CM

## 2023-05-02 DIAGNOSIS — I10 HYPERTENSION, UNSPECIFIED TYPE: ICD-10-CM

## 2023-05-02 PROCEDURE — 99204 OFFICE O/P NEW MOD 45 MIN: CPT | Mod: 25,S$GLB,, | Performed by: INTERNAL MEDICINE

## 2023-05-02 PROCEDURE — 3008F BODY MASS INDEX DOCD: CPT | Mod: CPTII,S$GLB,, | Performed by: INTERNAL MEDICINE

## 2023-05-02 PROCEDURE — 99999 PR PBB SHADOW E&M-EST. PATIENT-LVL III: ICD-10-PCS | Mod: PBBFAC,,, | Performed by: INTERNAL MEDICINE

## 2023-05-02 PROCEDURE — 3288F FALL RISK ASSESSMENT DOCD: CPT | Mod: CPTII,S$GLB,, | Performed by: INTERNAL MEDICINE

## 2023-05-02 PROCEDURE — 1159F PR MEDICATION LIST DOCUMENTED IN MEDICAL RECORD: ICD-10-PCS | Mod: CPTII,S$GLB,, | Performed by: INTERNAL MEDICINE

## 2023-05-02 PROCEDURE — 4010F PR ACE/ARB THEARPY RXD/TAKEN: ICD-10-PCS | Mod: CPTII,S$GLB,, | Performed by: INTERNAL MEDICINE

## 2023-05-02 PROCEDURE — 3077F SYST BP >= 140 MM HG: CPT | Mod: CPTII,S$GLB,, | Performed by: INTERNAL MEDICINE

## 2023-05-02 PROCEDURE — 99999 PR PBB SHADOW E&M-EST. PATIENT-LVL III: CPT | Mod: PBBFAC,,, | Performed by: INTERNAL MEDICINE

## 2023-05-02 PROCEDURE — 93000 ELECTROCARDIOGRAM COMPLETE: CPT | Mod: S$GLB,,, | Performed by: INTERNAL MEDICINE

## 2023-05-02 PROCEDURE — 1160F RVW MEDS BY RX/DR IN RCRD: CPT | Mod: CPTII,S$GLB,, | Performed by: INTERNAL MEDICINE

## 2023-05-02 PROCEDURE — 1160F PR REVIEW ALL MEDS BY PRESCRIBER/CLIN PHARMACIST DOCUMENTED: ICD-10-PCS | Mod: CPTII,S$GLB,, | Performed by: INTERNAL MEDICINE

## 2023-05-02 PROCEDURE — 3288F PR FALLS RISK ASSESSMENT DOCUMENTED: ICD-10-PCS | Mod: CPTII,S$GLB,, | Performed by: INTERNAL MEDICINE

## 2023-05-02 PROCEDURE — 3008F PR BODY MASS INDEX (BMI) DOCUMENTED: ICD-10-PCS | Mod: CPTII,S$GLB,, | Performed by: INTERNAL MEDICINE

## 2023-05-02 PROCEDURE — 1101F PR PT FALLS ASSESS DOC 0-1 FALLS W/OUT INJ PAST YR: ICD-10-PCS | Mod: CPTII,S$GLB,, | Performed by: INTERNAL MEDICINE

## 2023-05-02 PROCEDURE — 4010F ACE/ARB THERAPY RXD/TAKEN: CPT | Mod: CPTII,S$GLB,, | Performed by: INTERNAL MEDICINE

## 2023-05-02 PROCEDURE — 1159F MED LIST DOCD IN RCRD: CPT | Mod: CPTII,S$GLB,, | Performed by: INTERNAL MEDICINE

## 2023-05-02 PROCEDURE — 99204 PR OFFICE/OUTPT VISIT, NEW, LEVL IV, 45-59 MIN: ICD-10-PCS | Mod: 25,S$GLB,, | Performed by: INTERNAL MEDICINE

## 2023-05-02 PROCEDURE — 3078F PR MOST RECENT DIASTOLIC BLOOD PRESSURE < 80 MM HG: ICD-10-PCS | Mod: CPTII,S$GLB,, | Performed by: INTERNAL MEDICINE

## 2023-05-02 PROCEDURE — 1126F PR PAIN SEVERITY QUANTIFIED, NO PAIN PRESENT: ICD-10-PCS | Mod: CPTII,S$GLB,, | Performed by: INTERNAL MEDICINE

## 2023-05-02 PROCEDURE — 1101F PT FALLS ASSESS-DOCD LE1/YR: CPT | Mod: CPTII,S$GLB,, | Performed by: INTERNAL MEDICINE

## 2023-05-02 PROCEDURE — 1126F AMNT PAIN NOTED NONE PRSNT: CPT | Mod: CPTII,S$GLB,, | Performed by: INTERNAL MEDICINE

## 2023-05-02 PROCEDURE — 93000 EKG 12-LEAD: ICD-10-PCS | Mod: S$GLB,,, | Performed by: INTERNAL MEDICINE

## 2023-05-02 PROCEDURE — 3078F DIAST BP <80 MM HG: CPT | Mod: CPTII,S$GLB,, | Performed by: INTERNAL MEDICINE

## 2023-05-02 PROCEDURE — 3077F PR MOST RECENT SYSTOLIC BLOOD PRESSURE >= 140 MM HG: ICD-10-PCS | Mod: CPTII,S$GLB,, | Performed by: INTERNAL MEDICINE

## 2023-05-02 RX ORDER — AMLODIPINE BESYLATE 5 MG/1
5 TABLET ORAL DAILY
Qty: 30 TABLET | Refills: 11 | Status: SHIPPED | OUTPATIENT
Start: 2023-05-02 | End: 2023-06-14

## 2023-05-02 RX ORDER — FUROSEMIDE 20 MG/1
TABLET ORAL
COMMUNITY
Start: 2023-04-14 | End: 2023-06-14

## 2023-05-02 NOTE — PROGRESS NOTES
[facility] - Cardiology [address]  Cardiology Clinic Note      Chief Complaint  Chief Complaint   Patient presents with    Shortness of Breath     With exertion    Fatigue    Hypertension       HPI:      73-year-old male with past medical history prior normocytic anemia, tobacco, ANTONIA, eosinophilic asthma/COPD with chronic respiratory failure steroid dependent/asbestos exposure, hyperlipidemia, hypertension, coronary artery calcification by CT, echocardiogram 2017 normal EF grade 1 diastolic dysfunction normal RV    Patient of Dr. Glez for evaluation of mitral valve prolapse  Presents with outside echocardiogram report 4/2023 indicating mitral valve prolapse hyperdynamic EF  The patient does have chronic stable dyspnea on exertion attributed to COPD  The patient takes Lasix daily for lower extremity edema    Medications  Current Outpatient Medications   Medication Sig Dispense Refill    albuterol-ipratropium (DUO-NEB) 2.5 mg-0.5 mg/3 mL nebulizer solution Take 3 mLs by nebulization every 6 (six) hours as needed for Wheezing or Shortness of Breath. Rescue 360 mL 5    aspirin 81 MG Chew Take 1 tablet (81 mg total) by mouth once daily.      atorvastatin (LIPITOR) 40 MG tablet TAKE ONE TABLET BY MOUTH DAILY. 90 tablet 1    budesonide-glycopyr-formoterol (BREZTRI AEROSPHERE) 160-9-4.8 mcg/actuation HFAA Inhale 2 puffs into the lungs 2 (two) times a day. 10.7 g 2    diclofenac sodium (VOLTAREN) 1 % Gel Apply 2 g topically 4 (four) times daily. 350 g 2    fluticasone propionate (FLONASE) 50 mcg/actuation nasal spray Use 1 spray into each nostril once a day. 16 g 2    furosemide (LASIX) 20 MG tablet Take by mouth.      losartan (COZAAR) 100 MG tablet Take 1 tablet (100 mg total) by mouth once daily. 90 tablet 1    magnesium oxide 500 mg Tab Take tablet by mouth once daily. 90 each 0    potassium chloride SA (K-DUR,KLOR-CON M) 10 MEQ tablet Take one tablet by mouth daily as needed for cramps. 90 tablet 0    predniSONE  (DELTASONE) 10 MG tablet Take 1 tablet (10 mg total) by mouth once daily. 90 tablet 1    predniSONE (DELTASONE) 20 MG tablet TAKE 1 TABLET BY MOUTH 2 to 3 TIMES WEEKLY AS NEEDED 21 tablet 2    tiZANidine (ZANAFLEX) 4 MG tablet Take 1 tablet by mouth twice daily As Needed 180 tablet 0    albuterol (VENTOLIN HFA) 90 mcg/actuation inhaler Inhale 2 puffs into the lungs every 4 (four) hours as needed for Wheezing or Shortness of Breath. Rescue 18 g 2    amLODIPine (NORVASC) 5 MG tablet Take 1 tablet (5 mg total) by mouth once daily. 30 tablet 11    budesonide (PULMICORT) 0.5 mg/2 mL nebulizer solution Take 2 mLs (0.5 mg total) by nebulization 2 (two) times daily as needed (shortness of breath). 120 mL 1    mucus clearing device (ACAPELLA, FLUTTER) 1 Device by Misc.(Non-Drug; Combo Route) route 2 (two) times daily. 1 each 0     No current facility-administered medications for this visit.        History  Past Medical History:   Diagnosis Date    COPD (chronic obstructive pulmonary disease)     Hyperlipidemia     Hypertension     Obstructive sleep apnea     Unspecified glaucoma      Past Surgical History:   Procedure Laterality Date    INGUINAL HERNIA REPAIR Left     SHOULDER ARTHROSCOPY Right     WRIST SURGERY Left      Social History     Socioeconomic History    Marital status:    Tobacco Use    Smoking status: Former     Packs/day: 2.00     Years: 40.00     Pack years: 80.00     Types: Cigarettes     Quit date: 3/24/2009     Years since quittin.1    Smokeless tobacco: Never   Substance and Sexual Activity    Alcohol use: Yes     Comment: 6-12 beers/night; some days none.    Drug use: No     Family History   Problem Relation Age of Onset    Cancer Mother     Asthma Mother     Heart disease Father     COPD Father     Cancer Father     Cancer Brother         colon cancer    No Known Problems Brother     No Known Problems Brother         Allergies  Review of patient's allergies indicates:  No Known  Allergies    Review of Systems   Review of Systems   Constitutional: Negative for fever.   HENT:  Negative for nosebleeds.    Eyes:  Negative for visual disturbance.   Cardiovascular:  Positive for dyspnea on exertion. Negative for chest pain, claudication, palpitations and syncope.   Respiratory:  Negative for cough, hemoptysis and wheezing.    Endocrine: Negative for cold intolerance, heat intolerance, polyphagia and polyuria.   Hematologic/Lymphatic: Negative for bleeding problem.   Skin:  Negative for rash.   Musculoskeletal:  Negative for myalgias.   Gastrointestinal:  Negative for hematemesis, hematochezia, nausea and vomiting.   Genitourinary:  Negative for dysuria.   Neurological:  Negative for focal weakness and sensory change.   Psychiatric/Behavioral:  Negative for altered mental status.      Physical Exam  Vitals:    05/02/23 1411   BP: (!) 160/68   Pulse: 77     Wt Readings from Last 1 Encounters:   05/02/23 84.1 kg (185 lb 6.5 oz)     Physical Exam  HENT:      Head: Normocephalic and atraumatic.      Mouth/Throat:      Mouth: Mucous membranes are moist.   Eyes:      Extraocular Movements: Extraocular movements intact.      Pupils: Pupils are equal, round, and reactive to light.   Neck:      Vascular: No carotid bruit or JVD.   Cardiovascular:      Rate and Rhythm: Normal rate and regular rhythm.      Pulses: Normal pulses and intact distal pulses.      Heart sounds: Normal heart sounds. No murmur heard.    No friction rub. No gallop.   Pulmonary:      Effort: Pulmonary effort is normal.      Breath sounds: Normal breath sounds.   Abdominal:      Tenderness: There is no abdominal tenderness. There is no guarding or rebound.   Musculoskeletal:      Right lower leg: Edema present.      Left lower leg: Edema present.      Comments: Trace to 1+ bilateral lower extremity edema   Skin:     General: Skin is warm and dry.      Capillary Refill: Capillary refill takes less than 2 seconds.   Neurological:       General: No focal deficit present.      Mental Status: He is alert and oriented to person, place, and time.   Psychiatric:         Mood and Affect: Mood normal.       Labs  Admission on 12/24/2022, Discharged on 12/24/2022   Component Date Value Ref Range Status    WBC 12/24/2022 12.89 (H)  3.90 - 12.70 K/uL Final    RBC 12/24/2022 3.59 (L)  4.60 - 6.20 M/uL Final    Hemoglobin 12/24/2022 11.8 (L)  14.0 - 18.0 g/dL Final    Hematocrit 12/24/2022 34.9 (L)  40.0 - 54.0 % Final    MCV 12/24/2022 97  82 - 98 fL Final    MCH 12/24/2022 32.9 (H)  27.0 - 31.0 pg Final    MCHC 12/24/2022 33.8  32.0 - 36.0 g/dL Final    RDW 12/24/2022 12.8  11.5 - 14.5 % Final    Platelets 12/24/2022 220  150 - 450 K/uL Final    MPV 12/24/2022 10.4  9.2 - 12.9 fL Final    Immature Granulocytes 12/24/2022 0.8 (H)  0.0 - 0.5 % Final    Gran # (ANC) 12/24/2022 11.4 (H)  1.8 - 7.7 K/uL Final    Immature Grans (Abs) 12/24/2022 0.10 (H)  0.00 - 0.04 K/uL Final    Comment: Mild elevation in immature granulocytes is non specific and   can be seen in a variety of conditions including stress response,   acute inflammation, trauma and pregnancy. Correlation with other   laboratory and clinical findings is essential.      Lymph # 12/24/2022 0.6 (L)  1.0 - 4.8 K/uL Final    Mono # 12/24/2022 0.8  0.3 - 1.0 K/uL Final    Eos # 12/24/2022 0.0  0.0 - 0.5 K/uL Final    Baso # 12/24/2022 0.03  0.00 - 0.20 K/uL Final    nRBC 12/24/2022 0  0 /100 WBC Final    Gran % 12/24/2022 88.3 (H)  38.0 - 73.0 % Final    Lymph % 12/24/2022 4.3 (L)  18.0 - 48.0 % Final    Mono % 12/24/2022 6.2  4.0 - 15.0 % Final    Eosinophil % 12/24/2022 0.2  0.0 - 8.0 % Final    Basophil % 12/24/2022 0.2  0.0 - 1.9 % Final    Differential Method 12/24/2022 Automated   Final    CRP 12/24/2022 27.6 (H)  0.0 - 8.2 mg/L Final       EKG  05/02/2023 normal sinus rhythm normal rate several months specific ST-T changesG      Echo   No results found for this or any previous visit.    Imaging  No  results found.    Prior coronary angiogram / intervention:  No known    Assessment and Plan  1. Aortic atherosclerosis  Patient is on aspirin and statin    2. Hyperlipidemia, unspecified hyperlipidemia type  Continue statin    3. Hypertension, unspecified type  Add amlodipine 5 to losartan and furosemide  - IN OFFICE EKG 12-LEAD (to Muse)  - amLODIPine (NORVASC) 5 MG tablet; Take 1 tablet (5 mg total) by mouth once daily.  Dispense: 30 tablet; Refill: 11    4. Mitral valve prolapse  Repeat echocardiogram  - Echo; Future    5. SHOEMAKER (dyspnea on exertion)  Attributed to lung disease    6. Coronary atherosclerosis by CT  Aspirin high-dose statin    Follow Up  Follow up in about 6 weeks (around 6/13/2023).  Check BNP next visit    Reji Angulo MD, FACC, RPVI  Interventional Cardiology

## 2023-06-14 ENCOUNTER — OFFICE VISIT (OUTPATIENT)
Dept: CARDIOLOGY | Facility: CLINIC | Age: 73
End: 2023-06-14
Payer: MEDICARE

## 2023-06-14 VITALS
WEIGHT: 188.5 LBS | DIASTOLIC BLOOD PRESSURE: 68 MMHG | HEIGHT: 67 IN | HEART RATE: 74 BPM | SYSTOLIC BLOOD PRESSURE: 154 MMHG | OXYGEN SATURATION: 97 % | BODY MASS INDEX: 29.58 KG/M2

## 2023-06-14 DIAGNOSIS — I25.10 ATHEROSCLEROSIS OF NATIVE CORONARY ARTERY OF NATIVE HEART WITHOUT ANGINA PECTORIS: ICD-10-CM

## 2023-06-14 DIAGNOSIS — I34.1 MITRAL VALVE PROLAPSE: ICD-10-CM

## 2023-06-14 DIAGNOSIS — I70.0 ABDOMINAL AORTIC ATHEROSCLEROSIS: Primary | ICD-10-CM

## 2023-06-14 DIAGNOSIS — E78.5 HYPERLIPIDEMIA, UNSPECIFIED HYPERLIPIDEMIA TYPE: ICD-10-CM

## 2023-06-14 DIAGNOSIS — R60.9 EDEMA, UNSPECIFIED TYPE: ICD-10-CM

## 2023-06-14 DIAGNOSIS — R06.09 DOE (DYSPNEA ON EXERTION): ICD-10-CM

## 2023-06-14 DIAGNOSIS — I70.0 AORTIC ATHEROSCLEROSIS: ICD-10-CM

## 2023-06-14 DIAGNOSIS — I10 HYPERTENSION, UNSPECIFIED TYPE: ICD-10-CM

## 2023-06-14 PROCEDURE — 99214 PR OFFICE/OUTPT VISIT, EST, LEVL IV, 30-39 MIN: ICD-10-PCS | Mod: S$GLB,,, | Performed by: INTERNAL MEDICINE

## 2023-06-14 PROCEDURE — 99214 OFFICE O/P EST MOD 30 MIN: CPT | Mod: S$GLB,,, | Performed by: INTERNAL MEDICINE

## 2023-06-14 PROCEDURE — 3008F PR BODY MASS INDEX (BMI) DOCUMENTED: ICD-10-PCS | Mod: CPTII,S$GLB,, | Performed by: INTERNAL MEDICINE

## 2023-06-14 PROCEDURE — 4010F ACE/ARB THERAPY RXD/TAKEN: CPT | Mod: CPTII,S$GLB,, | Performed by: INTERNAL MEDICINE

## 2023-06-14 PROCEDURE — 4010F PR ACE/ARB THEARPY RXD/TAKEN: ICD-10-PCS | Mod: CPTII,S$GLB,, | Performed by: INTERNAL MEDICINE

## 2023-06-14 PROCEDURE — 3077F SYST BP >= 140 MM HG: CPT | Mod: CPTII,S$GLB,, | Performed by: INTERNAL MEDICINE

## 2023-06-14 PROCEDURE — 3078F PR MOST RECENT DIASTOLIC BLOOD PRESSURE < 80 MM HG: ICD-10-PCS | Mod: CPTII,S$GLB,, | Performed by: INTERNAL MEDICINE

## 2023-06-14 PROCEDURE — 3288F FALL RISK ASSESSMENT DOCD: CPT | Mod: CPTII,S$GLB,, | Performed by: INTERNAL MEDICINE

## 2023-06-14 PROCEDURE — 1101F PT FALLS ASSESS-DOCD LE1/YR: CPT | Mod: CPTII,S$GLB,, | Performed by: INTERNAL MEDICINE

## 2023-06-14 PROCEDURE — 99999 PR PBB SHADOW E&M-EST. PATIENT-LVL IV: ICD-10-PCS | Mod: PBBFAC,,, | Performed by: INTERNAL MEDICINE

## 2023-06-14 PROCEDURE — 3288F PR FALLS RISK ASSESSMENT DOCUMENTED: ICD-10-PCS | Mod: CPTII,S$GLB,, | Performed by: INTERNAL MEDICINE

## 2023-06-14 PROCEDURE — 99999 PR PBB SHADOW E&M-EST. PATIENT-LVL IV: CPT | Mod: PBBFAC,,, | Performed by: INTERNAL MEDICINE

## 2023-06-14 PROCEDURE — 1101F PR PT FALLS ASSESS DOC 0-1 FALLS W/OUT INJ PAST YR: ICD-10-PCS | Mod: CPTII,S$GLB,, | Performed by: INTERNAL MEDICINE

## 2023-06-14 PROCEDURE — 1126F AMNT PAIN NOTED NONE PRSNT: CPT | Mod: CPTII,S$GLB,, | Performed by: INTERNAL MEDICINE

## 2023-06-14 PROCEDURE — 1159F MED LIST DOCD IN RCRD: CPT | Mod: CPTII,S$GLB,, | Performed by: INTERNAL MEDICINE

## 2023-06-14 PROCEDURE — 3008F BODY MASS INDEX DOCD: CPT | Mod: CPTII,S$GLB,, | Performed by: INTERNAL MEDICINE

## 2023-06-14 PROCEDURE — 1159F PR MEDICATION LIST DOCUMENTED IN MEDICAL RECORD: ICD-10-PCS | Mod: CPTII,S$GLB,, | Performed by: INTERNAL MEDICINE

## 2023-06-14 PROCEDURE — 3078F DIAST BP <80 MM HG: CPT | Mod: CPTII,S$GLB,, | Performed by: INTERNAL MEDICINE

## 2023-06-14 PROCEDURE — 1160F RVW MEDS BY RX/DR IN RCRD: CPT | Mod: CPTII,S$GLB,, | Performed by: INTERNAL MEDICINE

## 2023-06-14 PROCEDURE — 1160F PR REVIEW ALL MEDS BY PRESCRIBER/CLIN PHARMACIST DOCUMENTED: ICD-10-PCS | Mod: CPTII,S$GLB,, | Performed by: INTERNAL MEDICINE

## 2023-06-14 PROCEDURE — 3077F PR MOST RECENT SYSTOLIC BLOOD PRESSURE >= 140 MM HG: ICD-10-PCS | Mod: CPTII,S$GLB,, | Performed by: INTERNAL MEDICINE

## 2023-06-14 PROCEDURE — 1126F PR PAIN SEVERITY QUANTIFIED, NO PAIN PRESENT: ICD-10-PCS | Mod: CPTII,S$GLB,, | Performed by: INTERNAL MEDICINE

## 2023-06-14 NOTE — PROGRESS NOTES
[facility] - Cardiology [address]  Cardiology Clinic Note      Chief Complaint  Chief Complaint   Patient presents with    Follow-up       HPI:      73-year-old male with past medical history prior normocytic anemia, tobacco, ANTONIA, eosinophilic asthma/COPD with chronic respiratory failure steroid dependent/asbestos exposure, hyperlipidemia, hypertension, coronary artery calcification by CT, echocardiogram EF 60% mild LVH normal diastolic function normal RV normal PASP normal CVP prior echocardiogram 2017 normal EF grade 1 diastolic dysfunction normal RV    Patient of Dr. Glez for evaluation of mitral valve prolapse  Presents with outside echocardiogram report 4/2023 indicating mitral valve prolapse hyperdynamic EF  The patient does have chronic stable dyspnea on exertion attributed to COPD  The patient takes Lasix daily for lower extremity edema    Last visit added amlodipine to losartan and furosemide and ordered echocardiogram with plans for BNP check this was    Patient had a visit with Dr. Glez this morning   Blood pressure is higher here after waiting 20 minutes and repeating    Medications  Current Outpatient Medications   Medication Sig Dispense Refill    albuterol (VENTOLIN HFA) 90 mcg/actuation inhaler Inhale 2 puffs into the lungs every 4 (four) hours as needed for Wheezing or Shortness of Breath. Rescue 18 g 2    amLODIPine (NORVASC) 5 MG tablet Take 1 tablet (5 mg total) by mouth once daily. 30 tablet 11    aspirin 81 MG Chew Take 1 tablet (81 mg total) by mouth once daily.      atorvastatin (LIPITOR) 40 MG tablet TAKE ONE TABLET BY MOUTH DAILY. 90 tablet 1    budesonide-glycopyr-formoterol (BREZTRI AEROSPHERE) 160-9-4.8 mcg/actuation HFAA Inhale 2 puffs into the lungs 2 (two) times a day. 10.7 g 2    furosemide (LASIX) 20 MG tablet Take by mouth.      losartan (COZAAR) 100 MG tablet Take 1 tablet (100 mg total) by mouth once daily. 90 tablet 1    magnesium oxide 500 mg Tab Take tablet by mouth  once daily. 90 each 0    potassium chloride SA (K-DUR,KLOR-CON M) 10 MEQ tablet Take one tablet by mouth daily as needed for cramps. 90 tablet 0    tiZANidine (ZANAFLEX) 4 MG tablet Take 1 tablet by mouth twice daily As Needed 180 tablet 0    albuterol-ipratropium (DUO-NEB) 2.5 mg-0.5 mg/3 mL nebulizer solution Take 3 mLs by nebulization every 6 (six) hours as needed for Wheezing or Shortness of Breath. Rescue 360 mL 5    budesonide (PULMICORT) 0.5 mg/2 mL nebulizer solution Take 2 mLs (0.5 mg total) by nebulization 2 (two) times daily as needed (shortness of breath). 120 mL 1    diclofenac sodium (VOLTAREN) 1 % Gel Apply 2 g topically 4 (four) times daily. 350 g 2    fluticasone propionate (FLONASE) 50 mcg/actuation nasal spray Use 1 spray into each nostril once a day. 16 g 2    mucus clearing device (ACAPELLA, FLUTTER) 1 Device by Misc.(Non-Drug; Combo Route) route 2 (two) times daily. 1 each 0    predniSONE (DELTASONE) 10 MG tablet Take 1 tablet (10 mg total) by mouth once daily. 90 tablet 1    predniSONE (DELTASONE) 20 MG tablet TAKE 1 TABLET BY MOUTH 2 to 3 TIMES WEEKLY AS NEEDED 21 tablet 2     No current facility-administered medications for this visit.        History  Past Medical History:   Diagnosis Date    COPD (chronic obstructive pulmonary disease)     Hyperlipidemia     Hypertension     Obstructive sleep apnea     Unspecified glaucoma      Past Surgical History:   Procedure Laterality Date    INGUINAL HERNIA REPAIR Left     SHOULDER ARTHROSCOPY Right     WRIST SURGERY Left      Social History     Socioeconomic History    Marital status:    Tobacco Use    Smoking status: Former     Packs/day: 2.00     Years: 40.00     Pack years: 80.00     Types: Cigarettes     Quit date: 3/24/2009     Years since quittin.2    Smokeless tobacco: Never   Substance and Sexual Activity    Alcohol use: Yes     Comment: 6-12 beers/night; some days none.    Drug use: No     Family History   Problem Relation Age  of Onset    Cancer Mother     Asthma Mother     Heart disease Father     COPD Father     Cancer Father     Cancer Brother         colon cancer    No Known Problems Brother     No Known Problems Brother         Allergies  Review of patient's allergies indicates:  No Known Allergies    Review of Systems   Review of Systems   Constitutional: Negative for fever.   HENT:  Negative for nosebleeds.    Eyes:  Negative for visual disturbance.   Cardiovascular:  Positive for dyspnea on exertion. Negative for chest pain, claudication, palpitations and syncope.   Respiratory:  Negative for cough, hemoptysis and wheezing.    Endocrine: Negative for cold intolerance, heat intolerance, polyphagia and polyuria.   Hematologic/Lymphatic: Negative for bleeding problem.   Skin:  Negative for rash.   Musculoskeletal:  Negative for myalgias.   Gastrointestinal:  Negative for hematemesis, hematochezia, nausea and vomiting.   Genitourinary:  Negative for dysuria.   Neurological:  Negative for focal weakness and sensory change.   Psychiatric/Behavioral:  Negative for altered mental status.      Physical Exam  Vitals:    06/14/23 0843   BP: (!) 154/68   Pulse: 74     Wt Readings from Last 1 Encounters:   06/14/23 85.5 kg (188 lb 7.9 oz)     Physical Exam  HENT:      Head: Normocephalic and atraumatic.      Mouth/Throat:      Mouth: Mucous membranes are moist.   Eyes:      Extraocular Movements: Extraocular movements intact.      Pupils: Pupils are equal, round, and reactive to light.   Neck:      Vascular: No carotid bruit or JVD.   Cardiovascular:      Rate and Rhythm: Normal rate and regular rhythm.      Pulses: Normal pulses and intact distal pulses.      Heart sounds: Normal heart sounds. No murmur heard.    No friction rub. No gallop.   Pulmonary:      Effort: Pulmonary effort is normal.      Breath sounds: Normal breath sounds.   Abdominal:      Tenderness: There is no abdominal tenderness. There is no guarding or rebound.    Musculoskeletal:      Right lower leg: Edema present.      Left lower leg: Edema present.      Comments: Trace bilateral lower extremity edema   Skin:     General: Skin is warm and dry.      Capillary Refill: Capillary refill takes less than 2 seconds.   Neurological:      General: No focal deficit present.      Mental Status: He is alert and oriented to person, place, and time.   Psychiatric:         Mood and Affect: Mood normal.       Labs  Hospital Outpatient Visit on 05/16/2023   Component Date Value Ref Range Status    BSA 05/16/2023 1.99  m2 Final    TDI SEPTAL 05/16/2023 0.09  m/s Final    LV LATERAL E/E' RATIO 05/16/2023 7.80  m/s Final    LV SEPTAL E/E' RATIO 05/16/2023 8.67  m/s Final    IVC diameter 05/16/2023 1.45  cm Final    Left Ventricular Outflow Tract Maame* 05/16/2023 0.69  cm/s Final    Left Ventricular Outflow Tract Maame* 05/16/2023 2.12  mmHg Final    AORTIC VALVE CUSP SEPERATION 05/16/2023 1.84  cm Final    TDI LATERAL 05/16/2023 0.10  m/s Final    PV PEAK VELOCITY 05/16/2023 1.09  cm/s Final    LVIDd 05/16/2023 4.00  3.5 - 6.0 cm Final    IVS 05/16/2023 1.28  0.6 - 1.1 cm Final    Posterior Wall 05/16/2023 1.30  0.6 - 1.1 cm Final    Ao root annulus 05/16/2023 3.06  cm Final    LVIDs 05/16/2023 3.59  2.1 - 4.0 cm Final    FS 05/16/2023 10  28 - 44 % Final    Sinus 05/16/2023 3.06  cm Final    STJ 05/16/2023 2.92  cm Final    LV mass 05/16/2023 184.38  g Final    LA size 05/16/2023 3.14  cm Final    RVDD 05/16/2023 2.65  cm Final    Left Ventricle Relative Wall Thick* 05/16/2023 0.65  cm Final    AV mean gradient 05/16/2023 4  mmHg Final    AV valve area 05/16/2023 1.65  cm2 Final    AV Velocity Ratio 05/16/2023 0.66   Final    AV index (prosthetic) 05/16/2023 0.58   Final    MV mean gradient 05/16/2023 1  mmHg Final    MV valve area p 1/2 method 05/16/2023 4.19  cm2 Final    MV valve area by continuity eq 05/16/2023 1.52  cm2 Final    E/A ratio 05/16/2023 0.98   Final    Mean e' 05/16/2023  0.10  m/s Final    E wave deceleration time 05/16/2023 195.92  msec Final    LVOT diameter 05/16/2023 1.91  cm Final    LVOT area 05/16/2023 2.9  cm2 Final    LVOT peak basil 05/16/2023 0.95  m/s Final    LVOT peak VTI 05/16/2023 19.40  cm Final    Ao peak basil 05/16/2023 1.43  m/s Final    Ao VTI 05/16/2023 33.6  cm Final    LVOT stroke volume 05/16/2023 55.56  cm3 Final    AV peak gradient 05/16/2023 8  mmHg Final    MV peak gradient 05/16/2023 4  mmHg Final    E/E' ratio 05/16/2023 8.21  m/s Final    MV Peak E Basil 05/16/2023 0.78  m/s Final    TR Max Basil 05/16/2023 2.47  m/s Final    MV VTI 05/16/2023 36.6  cm Final    MV stenosis pressure 1/2 time 05/16/2023 52.49  ms Final    MV Peak A Basil 05/16/2023 0.80  m/s Final    LV Systolic Volume 05/16/2023 53.99  mL Final    LV Systolic Volume Index 05/16/2023 27.5  mL/m2 Final    LV Diastolic Volume 05/16/2023 97.19  mL Final    LV Diastolic Volume Index 05/16/2023 49.59  mL/m2 Final    LV Mass Index 05/16/2023 94  g/m2 Final    RA Major Axis 05/16/2023 4.77  cm Final    Left Atrium Minor Axis 05/16/2023 3.54  cm Final    Left Atrium Major Axis 05/16/2023 5.73  cm Final    Triscuspid Valve Regurgitation Pea* 05/16/2023 24  mmHg Final    LA Volume Index (Mod) 05/16/2023 31.1  mL/m2 Final    LA volume (mod) 05/16/2023 61.00  cm3 Final    RA Width 05/16/2023 3.42  cm Final    Right Atrial Pressure (from IVC) 05/16/2023 3  mmHg Final    EF 05/16/2023 60  % Final    TV rest pulmonary artery pressure 05/16/2023 27  mmHg Final   Admission on 12/24/2022, Discharged on 12/24/2022   Component Date Value Ref Range Status    WBC 12/24/2022 12.89 (H)  3.90 - 12.70 K/uL Final    RBC 12/24/2022 3.59 (L)  4.60 - 6.20 M/uL Final    Hemoglobin 12/24/2022 11.8 (L)  14.0 - 18.0 g/dL Final    Hematocrit 12/24/2022 34.9 (L)  40.0 - 54.0 % Final    MCV 12/24/2022 97  82 - 98 fL Final    MCH 12/24/2022 32.9 (H)  27.0 - 31.0 pg Final    MCHC 12/24/2022 33.8  32.0 - 36.0 g/dL Final    RDW  12/24/2022 12.8  11.5 - 14.5 % Final    Platelets 12/24/2022 220  150 - 450 K/uL Final    MPV 12/24/2022 10.4  9.2 - 12.9 fL Final    Immature Granulocytes 12/24/2022 0.8 (H)  0.0 - 0.5 % Final    Gran # (ANC) 12/24/2022 11.4 (H)  1.8 - 7.7 K/uL Final    Immature Grans (Abs) 12/24/2022 0.10 (H)  0.00 - 0.04 K/uL Final    Comment: Mild elevation in immature granulocytes is non specific and   can be seen in a variety of conditions including stress response,   acute inflammation, trauma and pregnancy. Correlation with other   laboratory and clinical findings is essential.      Lymph # 12/24/2022 0.6 (L)  1.0 - 4.8 K/uL Final    Mono # 12/24/2022 0.8  0.3 - 1.0 K/uL Final    Eos # 12/24/2022 0.0  0.0 - 0.5 K/uL Final    Baso # 12/24/2022 0.03  0.00 - 0.20 K/uL Final    nRBC 12/24/2022 0  0 /100 WBC Final    Gran % 12/24/2022 88.3 (H)  38.0 - 73.0 % Final    Lymph % 12/24/2022 4.3 (L)  18.0 - 48.0 % Final    Mono % 12/24/2022 6.2  4.0 - 15.0 % Final    Eosinophil % 12/24/2022 0.2  0.0 - 8.0 % Final    Basophil % 12/24/2022 0.2  0.0 - 1.9 % Final    Differential Method 12/24/2022 Automated   Final    CRP 12/24/2022 27.6 (H)  0.0 - 8.2 mg/L Final       EKG  05/02/2023 normal sinus rhythm normal rate several months specific ST-T changesG      Echo   Results for orders placed or performed during the hospital encounter of 05/16/23   Echo   Result Value Ref Range    BSA 1.99 m2    TDI SEPTAL 0.09 m/s    LV LATERAL E/E' RATIO 7.80 m/s    LV SEPTAL E/E' RATIO 8.67 m/s    IVC diameter 1.45 cm    Left Ventricular Outflow Tract Mean Velocity 0.69 cm/s    Left Ventricular Outflow Tract Mean Gradient 2.12 mmHg    AORTIC VALVE CUSP SEPERATION 1.84 cm    TDI LATERAL 0.10 m/s    PV PEAK VELOCITY 1.09 cm/s    LVIDd 4.00 3.5 - 6.0 cm    IVS 1.28 0.6 - 1.1 cm    Posterior Wall 1.30 0.6 - 1.1 cm    Ao root annulus 3.06 cm    LVIDs 3.59 2.1 - 4.0 cm    FS 10 28 - 44 %    Sinus 3.06 cm    STJ 2.92 cm    LV mass 184.38 g    LA size 3.14 cm     RVDD 2.65 cm    Left Ventricle Relative Wall Thickness 0.65 cm    AV mean gradient 4 mmHg    AV valve area 1.65 cm2    AV Velocity Ratio 0.66     AV index (prosthetic) 0.58     MV mean gradient 1 mmHg    MV valve area p 1/2 method 4.19 cm2    MV valve area by continuity eq 1.52 cm2    E/A ratio 0.98     Mean e' 0.10 m/s    E wave deceleration time 195.92 msec    LVOT diameter 1.91 cm    LVOT area 2.9 cm2    LVOT peak basil 0.95 m/s    LVOT peak VTI 19.40 cm    Ao peak basil 1.43 m/s    Ao VTI 33.6 cm    LVOT stroke volume 55.56 cm3    AV peak gradient 8 mmHg    MV peak gradient 4 mmHg    E/E' ratio 8.21 m/s    MV Peak E Basil 0.78 m/s    TR Max Basil 2.47 m/s    MV VTI 36.6 cm    MV stenosis pressure 1/2 time 52.49 ms    MV Peak A Basil 0.80 m/s    LV Systolic Volume 53.99 mL    LV Systolic Volume Index 27.5 mL/m2    LV Diastolic Volume 97.19 mL    LV Diastolic Volume Index 49.59 mL/m2    LV Mass Index 94 g/m2    RA Major Axis 4.77 cm    Left Atrium Minor Axis 3.54 cm    Left Atrium Major Axis 5.73 cm    Triscuspid Valve Regurgitation Peak Gradient 24 mmHg    LA Volume Index (Mod) 31.1 mL/m2    LA volume (mod) 61.00 cm3    RA Width 3.42 cm    Right Atrial Pressure (from IVC) 3 mmHg    EF 60 %    TV rest pulmonary artery pressure 27 mmHg    Narrative    · The left ventricle is normal in size with mild concentric hypertrophy   and normal systolic function.  · The estimated ejection fraction is 60%.  · Normal left ventricular diastolic function.  · Normal right ventricular size with normal right ventricular systolic   function.  · The estimated PA systolic pressure is at least 27 mmHg.  · Normal central venous pressure (3 mmHg).          Imaging  No results found.    Prior coronary angiogram / intervention:  No known    Assessment and Plan  1. Aortic atherosclerosis  Patient is on aspirin and statin    2. Hyperlipidemia, unspecified hyperlipidemia type  Continue statin    3. Hypertension, unspecified type  Continue amlodipine,  losartan, and furosemide  Blood pressure log every night for 2 weeks and drop off at the office    4. Mitral valve prolapse  Not significant on echocardiogram  Repeated as above    5. SHOEMAKER (dyspnea on exertion)  Attributed to lung disease  Chronic stable    6. Coronary atherosclerosis by CT  Aspirin high-dose statin    Follow Up  3 months  BP log  Check BNP BMP    Reji Angulo MD, FACC, RPVI  Interventional Cardiology

## 2023-06-16 ENCOUNTER — TELEPHONE (OUTPATIENT)
Dept: CARDIOLOGY | Facility: CLINIC | Age: 73
End: 2023-06-16
Payer: MEDICARE

## 2023-06-16 NOTE — TELEPHONE ENCOUNTER
"LOV 06/14/2023    Spoke with patient, he stopped Lasix and Potassium as recommended by provider x 2 days.  He woke up this morning with both feet swelling.  Confirmed patient also decreased losartan to 50 mg and increased amlodipine to 10 mg.    He asked, "can I take the fluid pill without the potassium because my feet are swelling".  "

## 2023-06-16 NOTE — TELEPHONE ENCOUNTER
----- Message from Kin Sawyer sent at 6/16/2023 10:36 AM CDT -----  Regarding: call back  Pt call to speak with some one in regards to his meds requesting call back    Call

## 2023-06-20 DIAGNOSIS — J44.9 STEROID-DEPENDENT CHRONIC OBSTRUCTIVE PULMONARY DISEASE: ICD-10-CM

## 2023-06-20 DIAGNOSIS — Z92.241 STEROID-DEPENDENT CHRONIC OBSTRUCTIVE PULMONARY DISEASE: ICD-10-CM

## 2023-06-20 DIAGNOSIS — J43.9 PULMONARY EMPHYSEMA, UNSPECIFIED EMPHYSEMA TYPE: ICD-10-CM

## 2023-06-20 DIAGNOSIS — Z79.52 CURRENT CHRONIC USE OF SYSTEMIC STEROIDS: ICD-10-CM

## 2023-06-20 RX ORDER — PREDNISONE 10 MG/1
10 TABLET ORAL DAILY
Qty: 90 TABLET | Refills: 1 | OUTPATIENT
Start: 2023-06-20 | End: 2023-09-18

## 2023-06-20 RX ORDER — PREDNISONE 20 MG/1
TABLET ORAL
Qty: 21 TABLET | Refills: 2 | OUTPATIENT
Start: 2023-06-20

## 2023-06-21 ENCOUNTER — CLINICAL SUPPORT (OUTPATIENT)
Dept: CARDIOLOGY | Facility: CLINIC | Age: 73
End: 2023-06-21
Payer: MEDICARE

## 2023-06-21 VITALS
DIASTOLIC BLOOD PRESSURE: 66 MMHG | HEIGHT: 67 IN | OXYGEN SATURATION: 97 % | BODY MASS INDEX: 29.9 KG/M2 | HEART RATE: 59 BPM | SYSTOLIC BLOOD PRESSURE: 154 MMHG | WEIGHT: 190.5 LBS

## 2023-06-21 DIAGNOSIS — I10 HYPERTENSION, UNSPECIFIED TYPE: Primary | ICD-10-CM

## 2023-06-21 PROCEDURE — 99999 PR PBB SHADOW E&M-EST. PATIENT-LVL II: CPT | Mod: PBBFAC,,,

## 2023-06-21 PROCEDURE — 99999 PR PBB SHADOW E&M-EST. PATIENT-LVL II: ICD-10-PCS | Mod: PBBFAC,,,

## 2023-06-21 NOTE — PROGRESS NOTES
Blood pressure check for Dr. Angulo.  Patient concerned about swelling in lower extremities and feet, Dr. Angulo spoke with patient.

## 2023-08-13 DIAGNOSIS — J44.9 STEROID-DEPENDENT CHRONIC OBSTRUCTIVE PULMONARY DISEASE: ICD-10-CM

## 2023-08-13 DIAGNOSIS — Z92.241 STEROID-DEPENDENT CHRONIC OBSTRUCTIVE PULMONARY DISEASE: ICD-10-CM

## 2023-08-13 DIAGNOSIS — Z79.52 CURRENT CHRONIC USE OF SYSTEMIC STEROIDS: ICD-10-CM

## 2023-08-13 DIAGNOSIS — J43.9 PULMONARY EMPHYSEMA, UNSPECIFIED EMPHYSEMA TYPE: ICD-10-CM

## 2023-08-14 RX ORDER — PREDNISONE 20 MG/1
TABLET ORAL
Qty: 21 TABLET | Refills: 2 | OUTPATIENT
Start: 2023-08-14

## 2023-08-15 DIAGNOSIS — Z92.241 STEROID-DEPENDENT CHRONIC OBSTRUCTIVE PULMONARY DISEASE: ICD-10-CM

## 2023-08-15 DIAGNOSIS — J44.9 STEROID-DEPENDENT CHRONIC OBSTRUCTIVE PULMONARY DISEASE: ICD-10-CM

## 2023-08-15 DIAGNOSIS — Z79.52 CURRENT CHRONIC USE OF SYSTEMIC STEROIDS: ICD-10-CM

## 2023-08-15 DIAGNOSIS — J43.9 PULMONARY EMPHYSEMA, UNSPECIFIED EMPHYSEMA TYPE: ICD-10-CM

## 2023-08-15 RX ORDER — PREDNISONE 20 MG/1
TABLET ORAL
Qty: 21 TABLET | Refills: 2 | OUTPATIENT
Start: 2023-08-15

## 2023-09-05 DIAGNOSIS — Z92.241 STEROID-DEPENDENT CHRONIC OBSTRUCTIVE PULMONARY DISEASE: ICD-10-CM

## 2023-09-05 DIAGNOSIS — Z79.52 CURRENT CHRONIC USE OF SYSTEMIC STEROIDS: ICD-10-CM

## 2023-09-05 DIAGNOSIS — J43.9 PULMONARY EMPHYSEMA, UNSPECIFIED EMPHYSEMA TYPE: ICD-10-CM

## 2023-09-05 DIAGNOSIS — J44.9 STEROID-DEPENDENT CHRONIC OBSTRUCTIVE PULMONARY DISEASE: ICD-10-CM

## 2023-09-05 NOTE — TELEPHONE ENCOUNTER
----- Message from Sarahy Way sent at 9/5/2023 11:13 AM CDT -----  Contact: self  Type:  RX Refill Request    Who Called: self  Refill or New Rx:refill  RX Name and Strength: predniSONE (DELTASONE) 20 MG tablet and he sts he also gets  predniSONE (DELTASONE) 10 MG tablet  How is the patient currently taking it? (ex. 1XDay):as directed  Is this a 30 day or 90 day RX:as directed  Preferred Pharmacy with phone number:    [facility] - [place], [place] - [address]  [address] [address]  Phone: [phone] Fax: [phone]      Local or Mail Order:mail order  Ordering Provider:hector  Would the patient rather a call back or a response via [facility]? call  Best Call Back Number:[phone] (work)    Additional Information: please advise and thank you.

## 2023-09-06 RX ORDER — PREDNISONE 20 MG/1
TABLET ORAL
Qty: 21 TABLET | Refills: 2 | Status: SHIPPED | OUTPATIENT
Start: 2023-09-06

## 2023-09-20 ENCOUNTER — OFFICE VISIT (OUTPATIENT)
Dept: CARDIOLOGY | Facility: CLINIC | Age: 73
End: 2023-09-20
Payer: MEDICARE

## 2023-09-20 VITALS
HEIGHT: 67 IN | SYSTOLIC BLOOD PRESSURE: 154 MMHG | HEART RATE: 70 BPM | DIASTOLIC BLOOD PRESSURE: 74 MMHG | BODY MASS INDEX: 28.89 KG/M2 | OXYGEN SATURATION: 97 % | WEIGHT: 184.06 LBS

## 2023-09-20 DIAGNOSIS — I70.0 ABDOMINAL AORTIC ATHEROSCLEROSIS: Primary | ICD-10-CM

## 2023-09-20 DIAGNOSIS — R06.09 DOE (DYSPNEA ON EXERTION): ICD-10-CM

## 2023-09-20 DIAGNOSIS — I10 HYPERTENSION, UNSPECIFIED TYPE: ICD-10-CM

## 2023-09-20 DIAGNOSIS — I25.10 ATHEROSCLEROSIS OF NATIVE CORONARY ARTERY OF NATIVE HEART WITHOUT ANGINA PECTORIS: ICD-10-CM

## 2023-09-20 DIAGNOSIS — I70.0 AORTIC ATHEROSCLEROSIS: ICD-10-CM

## 2023-09-20 DIAGNOSIS — E78.5 HYPERLIPIDEMIA, UNSPECIFIED HYPERLIPIDEMIA TYPE: ICD-10-CM

## 2023-09-20 DIAGNOSIS — R60.9 EDEMA, UNSPECIFIED TYPE: ICD-10-CM

## 2023-09-20 DIAGNOSIS — I34.1 MITRAL VALVE PROLAPSE: ICD-10-CM

## 2023-09-20 PROCEDURE — 1125F AMNT PAIN NOTED PAIN PRSNT: CPT | Mod: CPTII,S$GLB,, | Performed by: INTERNAL MEDICINE

## 2023-09-20 PROCEDURE — 99214 PR OFFICE/OUTPT VISIT, EST, LEVL IV, 30-39 MIN: ICD-10-PCS | Mod: 25,S$GLB,, | Performed by: INTERNAL MEDICINE

## 2023-09-20 PROCEDURE — 3078F DIAST BP <80 MM HG: CPT | Mod: CPTII,S$GLB,, | Performed by: INTERNAL MEDICINE

## 2023-09-20 PROCEDURE — 3288F PR FALLS RISK ASSESSMENT DOCUMENTED: ICD-10-PCS | Mod: CPTII,S$GLB,, | Performed by: INTERNAL MEDICINE

## 2023-09-20 PROCEDURE — 1101F PR PT FALLS ASSESS DOC 0-1 FALLS W/OUT INJ PAST YR: ICD-10-PCS | Mod: CPTII,S$GLB,, | Performed by: INTERNAL MEDICINE

## 2023-09-20 PROCEDURE — 3077F SYST BP >= 140 MM HG: CPT | Mod: CPTII,S$GLB,, | Performed by: INTERNAL MEDICINE

## 2023-09-20 PROCEDURE — 1160F RVW MEDS BY RX/DR IN RCRD: CPT | Mod: CPTII,S$GLB,, | Performed by: INTERNAL MEDICINE

## 2023-09-20 PROCEDURE — 3008F PR BODY MASS INDEX (BMI) DOCUMENTED: ICD-10-PCS | Mod: CPTII,S$GLB,, | Performed by: INTERNAL MEDICINE

## 2023-09-20 PROCEDURE — 1125F PR PAIN SEVERITY QUANTIFIED, PAIN PRESENT: ICD-10-PCS | Mod: CPTII,S$GLB,, | Performed by: INTERNAL MEDICINE

## 2023-09-20 PROCEDURE — 1159F PR MEDICATION LIST DOCUMENTED IN MEDICAL RECORD: ICD-10-PCS | Mod: CPTII,S$GLB,, | Performed by: INTERNAL MEDICINE

## 2023-09-20 PROCEDURE — 93000 ELECTROCARDIOGRAM COMPLETE: CPT | Mod: S$GLB,,, | Performed by: INTERNAL MEDICINE

## 2023-09-20 PROCEDURE — 99999 PR PBB SHADOW E&M-EST. PATIENT-LVL III: ICD-10-PCS | Mod: PBBFAC,,, | Performed by: INTERNAL MEDICINE

## 2023-09-20 PROCEDURE — 1160F PR REVIEW ALL MEDS BY PRESCRIBER/CLIN PHARMACIST DOCUMENTED: ICD-10-PCS | Mod: CPTII,S$GLB,, | Performed by: INTERNAL MEDICINE

## 2023-09-20 PROCEDURE — 99999 PR PBB SHADOW E&M-EST. PATIENT-LVL III: CPT | Mod: PBBFAC,,, | Performed by: INTERNAL MEDICINE

## 2023-09-20 PROCEDURE — 93000 EKG 12-LEAD: ICD-10-PCS | Mod: S$GLB,,, | Performed by: INTERNAL MEDICINE

## 2023-09-20 PROCEDURE — 4010F PR ACE/ARB THEARPY RXD/TAKEN: ICD-10-PCS | Mod: CPTII,S$GLB,, | Performed by: INTERNAL MEDICINE

## 2023-09-20 PROCEDURE — 3008F BODY MASS INDEX DOCD: CPT | Mod: CPTII,S$GLB,, | Performed by: INTERNAL MEDICINE

## 2023-09-20 PROCEDURE — 3078F PR MOST RECENT DIASTOLIC BLOOD PRESSURE < 80 MM HG: ICD-10-PCS | Mod: CPTII,S$GLB,, | Performed by: INTERNAL MEDICINE

## 2023-09-20 PROCEDURE — 4010F ACE/ARB THERAPY RXD/TAKEN: CPT | Mod: CPTII,S$GLB,, | Performed by: INTERNAL MEDICINE

## 2023-09-20 PROCEDURE — 1159F MED LIST DOCD IN RCRD: CPT | Mod: CPTII,S$GLB,, | Performed by: INTERNAL MEDICINE

## 2023-09-20 PROCEDURE — 1101F PT FALLS ASSESS-DOCD LE1/YR: CPT | Mod: CPTII,S$GLB,, | Performed by: INTERNAL MEDICINE

## 2023-09-20 PROCEDURE — 3288F FALL RISK ASSESSMENT DOCD: CPT | Mod: CPTII,S$GLB,, | Performed by: INTERNAL MEDICINE

## 2023-09-20 PROCEDURE — 99214 OFFICE O/P EST MOD 30 MIN: CPT | Mod: 25,S$GLB,, | Performed by: INTERNAL MEDICINE

## 2023-09-20 PROCEDURE — 3077F PR MOST RECENT SYSTOLIC BLOOD PRESSURE >= 140 MM HG: ICD-10-PCS | Mod: CPTII,S$GLB,, | Performed by: INTERNAL MEDICINE

## 2023-09-20 NOTE — PROGRESS NOTES
[facility] - Cardiology [address]  Cardiology Clinic Note      Chief Complaint  Chief Complaint   Patient presents with    Follow-up       HPI:      73-year-old male with past medical history prior normocytic anemia, tobacco, ANTONIA, eosinophilic asthma/COPD with chronic respiratory failure steroid dependent/asbestos exposure, hyperlipidemia, hypertension, coronary artery calcification by CT, echocardiogram EF 60% mild LVH normal diastolic function normal RV normal PASP normal CVP prior echocardiogram 2017 normal EF grade 1 diastolic dysfunction normal RV    Patient of Dr. Glez for evaluation of mitral valve prolapse  Presents with outside echocardiogram report 4/2023 indicating mitral valve prolapse hyperdynamic EF  The patient does have chronic stable dyspnea on exertion attributed to COPD  The patient took Lasix daily for lower extremity edema    Previously added amlodipine to losartan and furosemide and ordered echocardiogram with BNP which was normal  Last visit check labs there was hypernatremia hyperkalemia with normal creatinine  Suggested patient stop Lasix and potassium and decrease losartan in addition to increasing amlodipine  Subsequently told the patient he may take Lasix p.r.n. infrequently without potassium  Repeat labs ordered not done      Medications  Current Outpatient Medications   Medication Sig Dispense Refill    albuterol (VENTOLIN HFA) 90 mcg/actuation inhaler Inhale 2 puffs into the lungs every 4 (four) hours as needed for Wheezing or Shortness of Breath. Rescue 18 g 2    albuterol-ipratropium (DUO-NEB) 2.5 mg-0.5 mg/3 mL nebulizer solution Take 3 mLs by nebulization every 6 (six) hours as needed for Wheezing or Shortness of Breath. Rescue 360 mL 5    amLODIPine (NORVASC) 10 MG tablet Take 1 tablet (10 mg total) by mouth once daily. 90 tablet 1    aspirin 81 MG Chew Take 1 tablet (81 mg total) by mouth once daily.      atorvastatin (LIPITOR) 40 MG tablet TAKE ONE TABLET BY MOUTH DAILY. 90  tablet 1    budesonide (PULMICORT) 0.5 mg/2 mL nebulizer solution Take 2 mLs (0.5 mg total) by nebulization 2 (two) times daily as needed (shortness of breath). 120 mL 1    budesonide-glycopyr-formoterol (BREZTRI AEROSPHERE) 160-9-4.8 mcg/actuation HFAA Inhale 2 puffs into the lungs 2 (two) times a day. 10.7 g 2    losartan (COZAAR) 50 MG tablet Take 1 tablet (50 mg total) by mouth once daily. 90 tablet 1    magnesium oxide 500 mg Tab Take tablet by mouth once daily. 90 each 0    tiZANidine (ZANAFLEX) 4 MG tablet Take 1 tablet by mouth twice daily as needed. 180 tablet 0    diclofenac sodium (VOLTAREN) 1 % Gel Apply 2 g topically 4 (four) times daily. 350 g 2    fluticasone propionate (FLONASE) 50 mcg/actuation nasal spray Use 1 spray into each nostril once a day. 16 g 2    mucus clearing device (ACAPELLA, FLUTTER) 1 Device by Misc.(Non-Drug; Combo Route) route 2 (two) times daily. 1 each 0    predniSONE (DELTASONE) 20 MG tablet TAKE 1 TABLET BY MOUTH 2 to 3 TIMES WEEKLY AS NEEDED 21 tablet 2     No current facility-administered medications for this visit.        History  Past Medical History:   Diagnosis Date    COPD (chronic obstructive pulmonary disease)     Hyperlipidemia     Hypertension     Obstructive sleep apnea     Unspecified glaucoma      Past Surgical History:   Procedure Laterality Date    INGUINAL HERNIA REPAIR Left     SHOULDER ARTHROSCOPY Right     WRIST SURGERY Left      Social History     Socioeconomic History    Marital status:    Tobacco Use    Smoking status: Former     Current packs/day: 0.00     Average packs/day: 2.0 packs/day for 40.0 years (80.0 ttl pk-yrs)     Types: Cigarettes     Start date: 3/24/1969     Quit date: 3/24/2009     Years since quittin.5    Smokeless tobacco: Never   Substance and Sexual Activity    Alcohol use: Yes     Comment: 6-12 beers/night; some days none.    Drug use: No     Family History   Problem Relation Age of Onset    Cancer Mother     Asthma  Mother     Heart disease Father     COPD Father     Cancer Father     Cancer Brother         colon cancer    No Known Problems Brother     No Known Problems Brother         Allergies  Review of patient's allergies indicates:  No Known Allergies    Review of Systems   Review of Systems   Constitutional: Negative for fever.   HENT:  Negative for nosebleeds.    Eyes:  Negative for visual disturbance.   Cardiovascular:  Positive for dyspnea on exertion. Negative for chest pain, claudication, palpitations and syncope.   Respiratory:  Negative for cough, hemoptysis and wheezing.    Endocrine: Negative for cold intolerance, heat intolerance, polyphagia and polyuria.   Hematologic/Lymphatic: Negative for bleeding problem.   Skin:  Negative for rash.   Musculoskeletal:  Negative for myalgias.   Gastrointestinal:  Negative for hematemesis, hematochezia, nausea and vomiting.   Genitourinary:  Negative for dysuria.   Neurological:  Negative for focal weakness and sensory change.   Psychiatric/Behavioral:  Negative for altered mental status.        Physical Exam  Vitals:    09/20/23 0843   BP: (!) 154/74   Pulse: 70     Wt Readings from Last 1 Encounters:   09/20/23 83.5 kg (184 lb 1.4 oz)     Physical Exam  HENT:      Head: Normocephalic and atraumatic.      Mouth/Throat:      Mouth: Mucous membranes are moist.   Eyes:      Extraocular Movements: Extraocular movements intact.      Pupils: Pupils are equal, round, and reactive to light.   Neck:      Vascular: No carotid bruit or JVD.   Cardiovascular:      Rate and Rhythm: Normal rate and regular rhythm.      Pulses: Normal pulses and intact distal pulses.      Heart sounds: Normal heart sounds. No murmur heard.     No friction rub. No gallop.   Pulmonary:      Effort: Pulmonary effort is normal.      Breath sounds: Normal breath sounds.   Abdominal:      Tenderness: There is no abdominal tenderness. There is no guarding or rebound.   Musculoskeletal:      Right lower leg: Edema  present.      Left lower leg: Edema present.      Comments: Trace bilateral lower extremity edema   Skin:     General: Skin is warm and dry.      Capillary Refill: Capillary refill takes less than 2 seconds.   Neurological:      General: No focal deficit present.      Mental Status: He is alert and oriented to person, place, and time.   Psychiatric:         Mood and Affect: Mood normal.         Labs  Lab Visit on 06/14/2023   Component Date Value Ref Range Status    BNP 06/14/2023 71  0 - 99 pg/mL Final    Values of less than 100 pg/ml are consistent with non-CHF populations.    Sodium 06/14/2023 148 (H)  136 - 145 mmol/L Final    Potassium 06/14/2023 5.5 (H)  3.5 - 5.1 mmol/L Final    Chloride 06/14/2023 108  95 - 110 mmol/L Final    CO2 06/14/2023 28  23 - 29 mmol/L Final    Glucose 06/14/2023 83  70 - 110 mg/dL Final    BUN 06/14/2023 16  8 - 23 mg/dL Final    Creatinine 06/14/2023 1.0  0.5 - 1.4 mg/dL Final    Calcium 06/14/2023 9.5  8.7 - 10.5 mg/dL Final    Anion Gap 06/14/2023 12  8 - 16 mmol/L Final    eGFR 06/14/2023 >60.0  >60 mL/min/1.73 m^2 Final   Hospital Outpatient Visit on 05/16/2023   Component Date Value Ref Range Status    BSA 05/16/2023 1.99  m2 Final    TDI SEPTAL 05/16/2023 0.09  m/s Final    LV LATERAL E/E' RATIO 05/16/2023 7.80  m/s Final    LV SEPTAL E/E' RATIO 05/16/2023 8.67  m/s Final    IVC diameter 05/16/2023 1.45  cm Final    Left Ventricular Outflow Tract Maame* 05/16/2023 0.69  cm/s Final    Left Ventricular Outflow Tract Maame* 05/16/2023 2.12  mmHg Final    AORTIC VALVE CUSP SEPERATION 05/16/2023 1.84  cm Final    TDI LATERAL 05/16/2023 0.10  m/s Final    PV PEAK VELOCITY 05/16/2023 1.09  cm/s Final    LVIDd 05/16/2023 4.00  3.5 - 6.0 cm Final    IVS 05/16/2023 1.28  0.6 - 1.1 cm Final    Posterior Wall 05/16/2023 1.30  0.6 - 1.1 cm Final    Ao root annulus 05/16/2023 3.06  cm Final    LVIDs 05/16/2023 3.59  2.1 - 4.0 cm Final    FS 05/16/2023 10  28 - 44 % Final    Sinus 05/16/2023  3.06  cm Final    STJ 05/16/2023 2.92  cm Final    LV mass 05/16/2023 184.38  g Final    LA size 05/16/2023 3.14  cm Final    RVDD 05/16/2023 2.65  cm Final    Left Ventricle Relative Wall Thick* 05/16/2023 0.65  cm Final    AV mean gradient 05/16/2023 4  mmHg Final    AV valve area 05/16/2023 1.65  cm2 Final    AV Velocity Ratio 05/16/2023 0.66   Final    AV index (prosthetic) 05/16/2023 0.58   Final    MV mean gradient 05/16/2023 1  mmHg Final    MV valve area p 1/2 method 05/16/2023 4.19  cm2 Final    MV valve area by continuity eq 05/16/2023 1.52  cm2 Final    E/A ratio 05/16/2023 0.98   Final    Mean e' 05/16/2023 0.10  m/s Final    E wave deceleration time 05/16/2023 195.92  msec Final    LVOT diameter 05/16/2023 1.91  cm Final    LVOT area 05/16/2023 2.9  cm2 Final    LVOT peak basil 05/16/2023 0.95  m/s Final    LVOT peak VTI 05/16/2023 19.40  cm Final    Ao peak basil 05/16/2023 1.43  m/s Final    Ao VTI 05/16/2023 33.6  cm Final    LVOT stroke volume 05/16/2023 55.56  cm3 Final    AV peak gradient 05/16/2023 8  mmHg Final    MV peak gradient 05/16/2023 4  mmHg Final    E/E' ratio 05/16/2023 8.21  m/s Final    MV Peak E Basil 05/16/2023 0.78  m/s Final    TR Max Basil 05/16/2023 2.47  m/s Final    MV VTI 05/16/2023 36.6  cm Final    MV stenosis pressure 1/2 time 05/16/2023 52.49  ms Final    MV Peak A Basil 05/16/2023 0.80  m/s Final    LV Systolic Volume 05/16/2023 53.99  mL Final    LV Systolic Volume Index 05/16/2023 27.5  mL/m2 Final    LV Diastolic Volume 05/16/2023 97.19  mL Final    LV Diastolic Volume Index 05/16/2023 49.59  mL/m2 Final    LV Mass Index 05/16/2023 94  g/m2 Final    RA Major Axis 05/16/2023 4.77  cm Final    Left Atrium Minor Axis 05/16/2023 3.54  cm Final    Left Atrium Major Axis 05/16/2023 5.73  cm Final    Triscuspid Valve Regurgitation Pea* 05/16/2023 24  mmHg Final    LA Volume Index (Mod) 05/16/2023 31.1  mL/m2 Final    LA volume (mod) 05/16/2023 61.00  cm3 Final    RA Width 05/16/2023  3.42  cm Final    Right Atrial Pressure (from IVC) 05/16/2023 3  mmHg Final    EF 05/16/2023 60  % Final    TV resting pulmonary artery pressu* 05/16/2023 27  mmHg Final       EKG  05/02/2023 normal sinus rhythm normal rate several months specific ST-T changes  09/20/2023 normal sinus rhythm normal rate abnormal precordial R-wave progression nonspecific ST-T changes      Echo   Results for orders placed or performed during the hospital encounter of 05/16/23   Echo   Result Value Ref Range    BSA 1.99 m2    TDI SEPTAL 0.09 m/s    LV LATERAL E/E' RATIO 7.80 m/s    LV SEPTAL E/E' RATIO 8.67 m/s    IVC diameter 1.45 cm    Left Ventricular Outflow Tract Mean Velocity 0.69 cm/s    Left Ventricular Outflow Tract Mean Gradient 2.12 mmHg    AORTIC VALVE CUSP SEPERATION 1.84 cm    TDI LATERAL 0.10 m/s    PV PEAK VELOCITY 1.09 cm/s    LVIDd 4.00 3.5 - 6.0 cm    IVS 1.28 0.6 - 1.1 cm    Posterior Wall 1.30 0.6 - 1.1 cm    Ao root annulus 3.06 cm    LVIDs 3.59 2.1 - 4.0 cm    FS 10 28 - 44 %    Sinus 3.06 cm    STJ 2.92 cm    LV mass 184.38 g    LA size 3.14 cm    RVDD 2.65 cm    Left Ventricle Relative Wall Thickness 0.65 cm    AV mean gradient 4 mmHg    AV valve area 1.65 cm2    AV Velocity Ratio 0.66     AV index (prosthetic) 0.58     MV mean gradient 1 mmHg    MV valve area p 1/2 method 4.19 cm2    MV valve area by continuity eq 1.52 cm2    E/A ratio 0.98     Mean e' 0.10 m/s    E wave deceleration time 195.92 msec    LVOT diameter 1.91 cm    LVOT area 2.9 cm2    LVOT peak basil 0.95 m/s    LVOT peak VTI 19.40 cm    Ao peak basil 1.43 m/s    Ao VTI 33.6 cm    LVOT stroke volume 55.56 cm3    AV peak gradient 8 mmHg    MV peak gradient 4 mmHg    E/E' ratio 8.21 m/s    MV Peak E Basil 0.78 m/s    TR Max Basil 2.47 m/s    MV VTI 36.6 cm    MV stenosis pressure 1/2 time 52.49 ms    MV Peak A Basil 0.80 m/s    LV Systolic Volume 53.99 mL    LV Systolic Volume Index 27.5 mL/m2    LV Diastolic Volume 97.19 mL    LV Diastolic Volume Index  49.59 mL/m2    LV Mass Index 94 g/m2    RA Major Axis 4.77 cm    Left Atrium Minor Axis 3.54 cm    Left Atrium Major Axis 5.73 cm    Triscuspid Valve Regurgitation Peak Gradient 24 mmHg    LA Volume Index (Mod) 31.1 mL/m2    LA volume (mod) 61.00 cm3    RA Width 3.42 cm    Right Atrial Pressure (from IVC) 3 mmHg    EF 60 %    TV resting pulmonary artery pressure 27 mmHg    Narrative    · The left ventricle is normal in size with mild concentric hypertrophy   and normal systolic function.  · The estimated ejection fraction is 60%.  · Normal left ventricular diastolic function.  · Normal right ventricular size with normal right ventricular systolic   function.  · The estimated PA systolic pressure is at least 27 mmHg.  · Normal central venous pressure (3 mmHg).          Imaging  No results found.    Prior coronary angiogram / intervention:  No known    Assessment and Plan  1. Aortic atherosclerosis  Patient is on aspirin and statin    2. Hyperlipidemia, unspecified hyperlipidemia type  Continue statin    3. Hypertension, unspecified type  Continue amlodipine, losartan  Lasix p.r.n. only  Check labs consider increasing losartan or adding HCTZ afterwards    4. Mitral valve prolapse  Not significant on echocardiogram  Repeated as above    5. SHOEMAKER (dyspnea on exertion)  Attributed to lung disease  Chronic stable    6. Coronary atherosclerosis by CT  Aspirin high-dose statin    7. Lower extremity edema  Lasix p.r.n. infrequently  No obvious cardiac etiology    Follow Up  3 months  BP log  Check BMP see above    Total professional time spent for the encounter: 30 minutes  Time was spent preparing to see the patient, reviewing results of prior testing, obtaining and/or reviewing separately obtained history, performing a medically appropriate examination and interview, counseling and educating the patient/family, ordering medications/tests/procedures, referring and communicating with other health care professionals,  documenting clinical information in the electronic health record, and independently interpreting results.     Reji Angulo MD, FACC, RPVI  Interventional Cardiology

## 2023-10-03 ENCOUNTER — CLINICAL SUPPORT (OUTPATIENT)
Dept: CARDIOLOGY | Facility: CLINIC | Age: 73
End: 2023-10-03
Payer: MEDICARE

## 2023-10-03 VITALS — SYSTOLIC BLOOD PRESSURE: 148 MMHG | DIASTOLIC BLOOD PRESSURE: 67 MMHG | OXYGEN SATURATION: 96 % | HEART RATE: 90 BPM

## 2023-10-03 DIAGNOSIS — I10 HYPERTENSION, UNSPECIFIED TYPE: Primary | ICD-10-CM

## 2023-10-03 PROCEDURE — 99999 PR PBB SHADOW E&M-EST. PATIENT-LVL II: CPT | Mod: PBBFAC,,,

## 2023-10-03 PROCEDURE — 99999 PR PBB SHADOW E&M-EST. PATIENT-LVL II: ICD-10-PCS | Mod: PBBFAC,,,

## 2023-10-03 NOTE — PROGRESS NOTES
1  week follow up BP check for Dr. Angulo.  Patient is currently taking Losartan 25 mg and HCTZ 25 mg.  Patient denies N/V, headaches, dizziness, blurred vision.      Dr. Angulo advised of visit via chart.  Advised patient, office will contact him with provider recommendations.  Patient verbalized understanding.    Patient states he had a carotid ultrasound and ekg in the office last week.  Advised I will contact his office to obtain reports.  Spoke with Dr. Glez's office [phone] to request reports.

## 2023-10-05 ENCOUNTER — PATIENT MESSAGE (OUTPATIENT)
Dept: CARDIOLOGY | Facility: CLINIC | Age: 73
End: 2023-10-05
Payer: MEDICARE

## 2023-10-22 NOTE — TELEPHONE ENCOUNTER
----- Message from Monica Mcnulty sent at 11/6/2019  1:52 PM CST -----  Type:  RX Refill Request    Who Called:  Patient - Brad   Refill or New Rx:  Refill   RX Name and Strength:  predniSONE (DELTASONE) 20 MG tablet  How is the patient currently taking it? (ex. 1XDay):  Take 1-3 pills a day for worsening SOB as needed  Is this a 30 day or 90 day RX:  90 day supply  [place] [id number] [address] # [phone] fax # [phone]    Local or Mail Order:  Local   Ordering Provider:  Ismael   Best Call Back Number:  [phone]    Additional Information:  Patient needs refill on prednisone and only has 1 pill left that he will be taking tonight - needs another asap - also he usually uses the mail order however since he has 1 left he needs something called in locally . And please add the [facility] to his chart I had to manually enter everything because it would come up under the smart phrase options     Stop antibiotic ear drop  After course of debrox have ear irrigated.

## 2023-12-06 DIAGNOSIS — J43.1 PANLOBULAR EMPHYSEMA: ICD-10-CM

## 2023-12-06 DIAGNOSIS — J44.9 CHRONIC OBSTRUCTIVE PULMONARY DISEASE, UNSPECIFIED COPD TYPE: ICD-10-CM

## 2023-12-06 RX ORDER — BUDESONIDE, GLYCOPYRROLATE, AND FORMOTEROL FUMARATE 160; 9; 4.8 UG/1; UG/1; UG/1
2 AEROSOL, METERED RESPIRATORY (INHALATION) 2 TIMES DAILY
Qty: 10.7 G | Refills: 2 | OUTPATIENT
Start: 2023-12-06

## 2023-12-21 DIAGNOSIS — J43.1 PANLOBULAR EMPHYSEMA: ICD-10-CM

## 2023-12-21 DIAGNOSIS — J44.9 CHRONIC OBSTRUCTIVE PULMONARY DISEASE, UNSPECIFIED COPD TYPE: ICD-10-CM

## 2023-12-22 RX ORDER — BUDESONIDE, GLYCOPYRROLATE, AND FORMOTEROL FUMARATE 160; 9; 4.8 UG/1; UG/1; UG/1
2 AEROSOL, METERED RESPIRATORY (INHALATION) 2 TIMES DAILY
Qty: 10.7 G | Refills: 2 | Status: SHIPPED | OUTPATIENT
Start: 2023-12-22

## 2023-12-27 ENCOUNTER — OFFICE VISIT (OUTPATIENT)
Dept: CARDIOLOGY | Facility: CLINIC | Age: 73
End: 2023-12-27
Payer: MEDICARE

## 2023-12-27 VITALS
HEART RATE: 61 BPM | WEIGHT: 178.38 LBS | OXYGEN SATURATION: 96 % | DIASTOLIC BLOOD PRESSURE: 70 MMHG | HEIGHT: 67 IN | BODY MASS INDEX: 28 KG/M2 | SYSTOLIC BLOOD PRESSURE: 159 MMHG

## 2023-12-27 DIAGNOSIS — I70.0 ABDOMINAL AORTIC ATHEROSCLEROSIS: Primary | ICD-10-CM

## 2023-12-27 DIAGNOSIS — I34.1 MITRAL VALVE PROLAPSE: ICD-10-CM

## 2023-12-27 DIAGNOSIS — I25.10 ATHEROSCLEROSIS OF NATIVE CORONARY ARTERY OF NATIVE HEART WITHOUT ANGINA PECTORIS: ICD-10-CM

## 2023-12-27 DIAGNOSIS — E78.5 HYPERLIPIDEMIA, UNSPECIFIED HYPERLIPIDEMIA TYPE: ICD-10-CM

## 2023-12-27 DIAGNOSIS — R06.09 DOE (DYSPNEA ON EXERTION): ICD-10-CM

## 2023-12-27 DIAGNOSIS — I10 HYPERTENSION, UNSPECIFIED TYPE: ICD-10-CM

## 2023-12-27 DIAGNOSIS — I70.0 AORTIC ATHEROSCLEROSIS: ICD-10-CM

## 2023-12-27 PROCEDURE — 3288F PR FALLS RISK ASSESSMENT DOCUMENTED: ICD-10-PCS | Mod: CPTII,S$GLB,, | Performed by: INTERNAL MEDICINE

## 2023-12-27 PROCEDURE — 1126F PR PAIN SEVERITY QUANTIFIED, NO PAIN PRESENT: ICD-10-PCS | Mod: CPTII,S$GLB,, | Performed by: INTERNAL MEDICINE

## 2023-12-27 PROCEDURE — 1159F PR MEDICATION LIST DOCUMENTED IN MEDICAL RECORD: ICD-10-PCS | Mod: CPTII,S$GLB,, | Performed by: INTERNAL MEDICINE

## 2023-12-27 PROCEDURE — 3077F PR MOST RECENT SYSTOLIC BLOOD PRESSURE >= 140 MM HG: ICD-10-PCS | Mod: CPTII,S$GLB,, | Performed by: INTERNAL MEDICINE

## 2023-12-27 PROCEDURE — 1101F PT FALLS ASSESS-DOCD LE1/YR: CPT | Mod: CPTII,S$GLB,, | Performed by: INTERNAL MEDICINE

## 2023-12-27 PROCEDURE — 3288F FALL RISK ASSESSMENT DOCD: CPT | Mod: CPTII,S$GLB,, | Performed by: INTERNAL MEDICINE

## 2023-12-27 PROCEDURE — 1160F RVW MEDS BY RX/DR IN RCRD: CPT | Mod: CPTII,S$GLB,, | Performed by: INTERNAL MEDICINE

## 2023-12-27 PROCEDURE — 99999 PR PBB SHADOW E&M-EST. PATIENT-LVL IV: CPT | Mod: PBBFAC,,, | Performed by: INTERNAL MEDICINE

## 2023-12-27 PROCEDURE — 1160F PR REVIEW ALL MEDS BY PRESCRIBER/CLIN PHARMACIST DOCUMENTED: ICD-10-PCS | Mod: CPTII,S$GLB,, | Performed by: INTERNAL MEDICINE

## 2023-12-27 PROCEDURE — 99214 OFFICE O/P EST MOD 30 MIN: CPT | Mod: S$GLB,,, | Performed by: INTERNAL MEDICINE

## 2023-12-27 PROCEDURE — 3008F BODY MASS INDEX DOCD: CPT | Mod: CPTII,S$GLB,, | Performed by: INTERNAL MEDICINE

## 2023-12-27 PROCEDURE — 3078F PR MOST RECENT DIASTOLIC BLOOD PRESSURE < 80 MM HG: ICD-10-PCS | Mod: CPTII,S$GLB,, | Performed by: INTERNAL MEDICINE

## 2023-12-27 PROCEDURE — 3008F PR BODY MASS INDEX (BMI) DOCUMENTED: ICD-10-PCS | Mod: CPTII,S$GLB,, | Performed by: INTERNAL MEDICINE

## 2023-12-27 PROCEDURE — 99999 PR PBB SHADOW E&M-EST. PATIENT-LVL IV: ICD-10-PCS | Mod: PBBFAC,,, | Performed by: INTERNAL MEDICINE

## 2023-12-27 PROCEDURE — 99214 PR OFFICE/OUTPT VISIT, EST, LEVL IV, 30-39 MIN: ICD-10-PCS | Mod: S$GLB,,, | Performed by: INTERNAL MEDICINE

## 2023-12-27 PROCEDURE — 3078F DIAST BP <80 MM HG: CPT | Mod: CPTII,S$GLB,, | Performed by: INTERNAL MEDICINE

## 2023-12-27 PROCEDURE — 3077F SYST BP >= 140 MM HG: CPT | Mod: CPTII,S$GLB,, | Performed by: INTERNAL MEDICINE

## 2023-12-27 PROCEDURE — 1159F MED LIST DOCD IN RCRD: CPT | Mod: CPTII,S$GLB,, | Performed by: INTERNAL MEDICINE

## 2023-12-27 PROCEDURE — 4010F PR ACE/ARB THEARPY RXD/TAKEN: ICD-10-PCS | Mod: CPTII,S$GLB,, | Performed by: INTERNAL MEDICINE

## 2023-12-27 PROCEDURE — 4010F ACE/ARB THERAPY RXD/TAKEN: CPT | Mod: CPTII,S$GLB,, | Performed by: INTERNAL MEDICINE

## 2023-12-27 PROCEDURE — 1101F PR PT FALLS ASSESS DOC 0-1 FALLS W/OUT INJ PAST YR: ICD-10-PCS | Mod: CPTII,S$GLB,, | Performed by: INTERNAL MEDICINE

## 2023-12-27 PROCEDURE — 1126F AMNT PAIN NOTED NONE PRSNT: CPT | Mod: CPTII,S$GLB,, | Performed by: INTERNAL MEDICINE

## 2023-12-27 RX ORDER — HYDRALAZINE HYDROCHLORIDE 10 MG/1
10 TABLET, FILM COATED ORAL 3 TIMES DAILY
Qty: 270 TABLET | Refills: 1 | Status: SHIPPED | OUTPATIENT
Start: 2023-12-27 | End: 2024-01-31

## 2023-12-27 NOTE — PROGRESS NOTES
[facility] - Cardiology [address]  Cardiology Clinic Note      Chief Complaint  Chief Complaint   Patient presents with    Follow-up       HPI:      73-year-old male with past medical history prior normocytic anemia, tobacco, ANTONIA, eosinophilic asthma/COPD with chronic respiratory failure steroid dependent/asbestos exposure, hyperlipidemia, hypertension, coronary artery calcification by CT, echocardiogram EF 60% mild LVH normal diastolic function normal RV normal PASP normal CVP prior echocardiogram 2017 normal EF grade 1 diastolic dysfunction normal RV    Patient of Dr. Glez for evaluation of mitral valve prolapse  Presents with outside echocardiogram report 4/2023 indicating mitral valve prolapse hyperdynamic EF  The patient does have chronic stable dyspnea on exertion attributed to COPD  The patient took Lasix daily for lower extremity edema    Previously added amlodipine to losartan and furosemide and ordered echocardiogram with BNP which was normal  Last visit check labs there was hypernatremia hyperkalemia with normal creatinine  Suggested patient stop Lasix and potassium and decrease losartan in addition to increasing amlodipine  Subsequently told the patient he may take Lasix p.r.n. infrequently without potassium  Check labs still mild hyperkalemia decrease losartan dose and added HCTZ discontinued Lasix (he was not taking potassium)  Ordered repeat labs not yet performed  Hypertensive today blood pressure repeated manually still nearly 155 systolic after adequate rest      Medications  Current Outpatient Medications   Medication Sig Dispense Refill    albuterol (VENTOLIN HFA) 90 mcg/actuation inhaler Inhale 2 puffs into the lungs every 4 (four) hours as needed for Wheezing or Shortness of Breath. Rescue 18 g 2    albuterol-ipratropium (DUO-NEB) 2.5 mg-0.5 mg/3 mL nebulizer solution Take 3 mLs by nebulization every 6 (six) hours as needed for Wheezing or Shortness of Breath. Rescue 360 mL 5    amLODIPine  (NORVASC) 10 MG tablet Take 1 tablet (10 mg total) by mouth once daily. 90 tablet 1    aspirin 81 MG Chew Take 1 tablet (81 mg total) by mouth once daily.      atorvastatin (LIPITOR) 40 MG tablet TAKE ONE TABLET BY MOUTH DAILY. 90 tablet 1    budesonide-glycopyr-formoterol (BREZTRI AEROSPHERE) 160-9-4.8 mcg/actuation HFAA Inhale 2 puffs into the lungs 2 (two) times a day. 10.7 g 2    hydroCHLOROthiazide (HYDRODIURIL) 25 MG tablet Take 1 tablet (25 mg total) by mouth once daily. 90 tablet 3    losartan (COZAAR) 25 MG tablet Take 1 tablet (25 mg total) by mouth once daily. 90 tablet 3    tiZANidine (ZANAFLEX) 4 MG tablet Take 1 tablet by mouth twice daily as needed. 180 tablet 0    budesonide (PULMICORT) 0.5 mg/2 mL nebulizer solution Take 2 mLs (0.5 mg total) by nebulization 2 (two) times daily as needed (shortness of breath). 120 mL 1    magnesium oxide 500 mg Tab Take tablet by mouth once daily. 90 each 0    mucus clearing device (ACAPELLA, FLUTTER) 1 Device by Misc.(Non-Drug; Combo Route) route 2 (two) times daily. 1 each 0    predniSONE (DELTASONE) 20 MG tablet TAKE 1 TABLET BY MOUTH 2 to 3 TIMES WEEKLY AS NEEDED 21 tablet 2     No current facility-administered medications for this visit.        History  Past Medical History:   Diagnosis Date    COPD (chronic obstructive pulmonary disease)     Hyperlipidemia     Hypertension     Obstructive sleep apnea     Unspecified glaucoma      Past Surgical History:   Procedure Laterality Date    INGUINAL HERNIA REPAIR Left     SHOULDER ARTHROSCOPY Right     WRIST SURGERY Left      Social History     Socioeconomic History    Marital status:    Tobacco Use    Smoking status: Former     Current packs/day: 0.00     Average packs/day: 2.0 packs/day for 40.0 years (80.0 ttl pk-yrs)     Types: Cigarettes     Start date: 3/24/1969     Quit date: 3/24/2009     Years since quittin.7    Smokeless tobacco: Never   Substance and Sexual Activity    Alcohol use: Yes      Comment: 6-12 beers/night; some days none.    Drug use: No     Family History   Problem Relation Age of Onset    Cancer Mother     Asthma Mother     Heart disease Father     COPD Father     Cancer Father     Cancer Brother         colon cancer    No Known Problems Brother     No Known Problems Brother         Allergies  Review of patient's allergies indicates:  No Known Allergies    Review of Systems   Review of Systems   Constitutional: Negative for fever.   HENT:  Negative for nosebleeds.    Eyes:  Negative for visual disturbance.   Cardiovascular:  Positive for dyspnea on exertion. Negative for chest pain, claudication, palpitations and syncope.   Respiratory:  Negative for cough, hemoptysis and wheezing.    Endocrine: Negative for cold intolerance, heat intolerance, polyphagia and polyuria.   Hematologic/Lymphatic: Negative for bleeding problem.   Skin:  Negative for rash.   Musculoskeletal:  Negative for myalgias.   Gastrointestinal:  Negative for hematemesis, hematochezia, nausea and vomiting.   Genitourinary:  Negative for dysuria.   Neurological:  Negative for focal weakness and sensory change.   Psychiatric/Behavioral:  Negative for altered mental status.        Physical Exam  Vitals:    12/27/23 0847   BP: (!) 159/70   Pulse: 61     Wt Readings from Last 1 Encounters:   12/27/23 80.9 kg (178 lb 5.6 oz)     Physical Exam  HENT:      Head: Normocephalic and atraumatic.      Mouth/Throat:      Mouth: Mucous membranes are moist.   Eyes:      Extraocular Movements: Extraocular movements intact.      Pupils: Pupils are equal, round, and reactive to light.   Neck:      Vascular: No carotid bruit or JVD.   Cardiovascular:      Rate and Rhythm: Normal rate and regular rhythm.      Pulses: Normal pulses and intact distal pulses.      Heart sounds: Normal heart sounds. No murmur heard.     No friction rub. No gallop.   Pulmonary:      Effort: Pulmonary effort is normal.      Breath sounds: Normal breath sounds.    Abdominal:      Tenderness: There is no abdominal tenderness. There is no guarding or rebound.   Musculoskeletal:      Right lower leg: Edema present.      Left lower leg: Edema present.      Comments: Trace bilateral lower extremity edema   Skin:     General: Skin is warm and dry.      Capillary Refill: Capillary refill takes less than 2 seconds.   Neurological:      General: No focal deficit present.      Mental Status: He is alert and oriented to person, place, and time.   Psychiatric:         Mood and Affect: Mood normal.         Labs  Lab Visit on 09/20/2023   Component Date Value Ref Range Status    Sodium 09/20/2023 140  136 - 145 mmol/L Final    Potassium 09/20/2023 5.3 (H)  3.5 - 5.1 mmol/L Final    Chloride 09/20/2023 104  95 - 110 mmol/L Final    CO2 09/20/2023 28  23 - 29 mmol/L Final    Glucose 09/20/2023 105  70 - 110 mg/dL Final    BUN 09/20/2023 14  8 - 23 mg/dL Final    Creatinine 09/20/2023 0.9  0.5 - 1.4 mg/dL Final    Calcium 09/20/2023 9.5  8.7 - 10.5 mg/dL Final    Anion Gap 09/20/2023 8  8 - 16 mmol/L Final    eGFR 09/20/2023 >60.0  >60 mL/min/1.73 m^2 Final       EKG  05/02/2023 normal sinus rhythm normal rate several months specific ST-T changes  09/20/2023 normal sinus rhythm normal rate abnormal precordial R-wave progression nonspecific ST-T changes      Echo   Results for orders placed or performed during the hospital encounter of 05/16/23   Echo   Result Value Ref Range    BSA 1.99 m2    TDI SEPTAL 0.09 m/s    LV LATERAL E/E' RATIO 7.80 m/s    LV SEPTAL E/E' RATIO 8.67 m/s    IVC diameter 1.45 cm    Left Ventricular Outflow Tract Mean Velocity 0.69 cm/s    Left Ventricular Outflow Tract Mean Gradient 2.12 mmHg    AORTIC VALVE CUSP SEPERATION 1.84 cm    TDI LATERAL 0.10 m/s    PV PEAK VELOCITY 1.09 cm/s    LVIDd 4.00 3.5 - 6.0 cm    IVS 1.28 0.6 - 1.1 cm    Posterior Wall 1.30 0.6 - 1.1 cm    Ao root annulus 3.06 cm    LVIDs 3.59 2.1 - 4.0 cm    FS 10 28 - 44 %    Sinus 3.06 cm    STJ  2.92 cm    LV mass 184.38 g    LA size 3.14 cm    RVDD 2.65 cm    Left Ventricle Relative Wall Thickness 0.65 cm    AV mean gradient 4 mmHg    AV valve area 1.65 cm2    AV Velocity Ratio 0.66     AV index (prosthetic) 0.58     MV mean gradient 1 mmHg    MV valve area p 1/2 method 4.19 cm2    MV valve area by continuity eq 1.52 cm2    E/A ratio 0.98     Mean e' 0.10 m/s    E wave deceleration time 195.92 msec    LVOT diameter 1.91 cm    LVOT area 2.9 cm2    LVOT peak basil 0.95 m/s    LVOT peak VTI 19.40 cm    Ao peak basil 1.43 m/s    Ao VTI 33.6 cm    LVOT stroke volume 55.56 cm3    AV peak gradient 8 mmHg    MV peak gradient 4 mmHg    E/E' ratio 8.21 m/s    MV Peak E Basil 0.78 m/s    TR Max Basil 2.47 m/s    MV VTI 36.6 cm    MV stenosis pressure 1/2 time 52.49 ms    MV Peak A Basil 0.80 m/s    LV Systolic Volume 53.99 mL    LV Systolic Volume Index 27.5 mL/m2    LV Diastolic Volume 97.19 mL    LV Diastolic Volume Index 49.59 mL/m2    LV Mass Index 94 g/m2    RA Major Axis 4.77 cm    Left Atrium Minor Axis 3.54 cm    Left Atrium Major Axis 5.73 cm    Triscuspid Valve Regurgitation Peak Gradient 24 mmHg    LA Volume Index (Mod) 31.1 mL/m2    LA volume (mod) 61.00 cm3    RA Width 3.42 cm    Right Atrial Pressure (from IVC) 3 mmHg    EF 60 %    TV resting pulmonary artery pressure 27 mmHg    Narrative    · The left ventricle is normal in size with mild concentric hypertrophy   and normal systolic function.  · The estimated ejection fraction is 60%.  · Normal left ventricular diastolic function.  · Normal right ventricular size with normal right ventricular systolic   function.  · The estimated PA systolic pressure is at least 27 mmHg.  · Normal central venous pressure (3 mmHg).          Imaging  No results found.    Prior coronary angiogram / intervention:  No known    Assessment and Plan  1. Aortic atherosclerosis  Patient is on aspirin and statin    2. Hyperlipidemia, unspecified hyperlipidemia type  Continue statin    3.  Hypertension, unspecified type  Continue amlodipine, losartan, hydrochlorothiazide  Add hydralazine 10 t.i.d.; may need to titrate next visit  Holding Lasix    4. Mitral valve prolapse  Not significant on echocardiogram  Repeated as above    5. SHOEMAKER (dyspnea on exertion)  Attributed to lung disease  Chronic stable    6. Coronary atherosclerosis by CT  Aspirin high-dose statin    7. Lower extremity edema  Holding Lasix  No obvious cardiac etiology    Follow Up  One month  BP log call if systolic greater than 140  Check BMP see above    Total professional time spent for the encounter: 30 minutes  Time was spent preparing to see the patient, reviewing results of prior testing, obtaining and/or reviewing separately obtained history, performing a medically appropriate examination and interview, counseling and educating the patient/family, ordering medications/tests/procedures, referring and communicating with other health care professionals, documenting clinical information in the electronic health record, and independently interpreting results.     Reji Angulo MD, FACC, RPVI  Interventional Cardiology

## 2024-01-16 ENCOUNTER — TELEPHONE (OUTPATIENT)
Dept: CARDIOLOGY | Facility: CLINIC | Age: 74
End: 2024-01-16
Payer: MEDICARE

## 2024-01-16 NOTE — TELEPHONE ENCOUNTER
Spoke with patient, rescheduled follow up appointment with provider as requested.  Appointment scheduled 01/31/2024.  Patient verbalized understanding.

## 2024-01-16 NOTE — TELEPHONE ENCOUNTER
----- Message from Mar Briones sent at 1/16/2024 10:37 AM CST -----  Regarding: call pt to r/s appt  Contact: PT  [phone]  The patient called requesting to r/s appt. Tested positive for covid. Please call patient to r/s appt    No further information provided      Patient can be contacted @# [phone]

## 2024-01-31 ENCOUNTER — OFFICE VISIT (OUTPATIENT)
Dept: CARDIOLOGY | Facility: CLINIC | Age: 74
End: 2024-01-31
Payer: MEDICARE

## 2024-01-31 VITALS
OXYGEN SATURATION: 97 % | DIASTOLIC BLOOD PRESSURE: 84 MMHG | WEIGHT: 177.81 LBS | SYSTOLIC BLOOD PRESSURE: 156 MMHG | HEART RATE: 74 BPM | HEIGHT: 67 IN | BODY MASS INDEX: 27.91 KG/M2

## 2024-01-31 DIAGNOSIS — I10 HYPERTENSION, UNSPECIFIED TYPE: ICD-10-CM

## 2024-01-31 DIAGNOSIS — I50.9 HEART FAILURE, UNSPECIFIED HF CHRONICITY, UNSPECIFIED HEART FAILURE TYPE: ICD-10-CM

## 2024-01-31 DIAGNOSIS — R06.09 DOE (DYSPNEA ON EXERTION): ICD-10-CM

## 2024-01-31 DIAGNOSIS — I25.10 ATHEROSCLEROSIS OF NATIVE CORONARY ARTERY OF NATIVE HEART WITHOUT ANGINA PECTORIS: ICD-10-CM

## 2024-01-31 DIAGNOSIS — I34.1 MITRAL VALVE PROLAPSE: Primary | ICD-10-CM

## 2024-01-31 DIAGNOSIS — I70.0 AORTIC ATHEROSCLEROSIS: ICD-10-CM

## 2024-01-31 DIAGNOSIS — E78.5 HYPERLIPIDEMIA, UNSPECIFIED HYPERLIPIDEMIA TYPE: ICD-10-CM

## 2024-01-31 PROCEDURE — 3288F FALL RISK ASSESSMENT DOCD: CPT | Mod: CPTII,S$GLB,, | Performed by: INTERNAL MEDICINE

## 2024-01-31 PROCEDURE — 99999 PR PBB SHADOW E&M-EST. PATIENT-LVL IV: CPT | Mod: PBBFAC,,, | Performed by: INTERNAL MEDICINE

## 2024-01-31 PROCEDURE — 3079F DIAST BP 80-89 MM HG: CPT | Mod: CPTII,S$GLB,, | Performed by: INTERNAL MEDICINE

## 2024-01-31 PROCEDURE — 1159F MED LIST DOCD IN RCRD: CPT | Mod: CPTII,S$GLB,, | Performed by: INTERNAL MEDICINE

## 2024-01-31 PROCEDURE — 3008F BODY MASS INDEX DOCD: CPT | Mod: CPTII,S$GLB,, | Performed by: INTERNAL MEDICINE

## 2024-01-31 PROCEDURE — 1101F PT FALLS ASSESS-DOCD LE1/YR: CPT | Mod: CPTII,S$GLB,, | Performed by: INTERNAL MEDICINE

## 2024-01-31 PROCEDURE — 1160F RVW MEDS BY RX/DR IN RCRD: CPT | Mod: CPTII,S$GLB,, | Performed by: INTERNAL MEDICINE

## 2024-01-31 PROCEDURE — 3077F SYST BP >= 140 MM HG: CPT | Mod: CPTII,S$GLB,, | Performed by: INTERNAL MEDICINE

## 2024-01-31 PROCEDURE — 99214 OFFICE O/P EST MOD 30 MIN: CPT | Mod: S$GLB,,, | Performed by: INTERNAL MEDICINE

## 2024-01-31 PROCEDURE — 1125F AMNT PAIN NOTED PAIN PRSNT: CPT | Mod: CPTII,S$GLB,, | Performed by: INTERNAL MEDICINE

## 2024-01-31 RX ORDER — HYDRALAZINE HYDROCHLORIDE 25 MG/1
25 TABLET, FILM COATED ORAL 3 TIMES DAILY
Qty: 270 TABLET | Refills: 1 | Status: SHIPPED | OUTPATIENT
Start: 2024-01-31 | End: 2025-01-30

## 2024-01-31 NOTE — PROGRESS NOTES
[facility] - Cardiology [address]  Cardiology Clinic Note      Chief Complaint  Chief Complaint   Patient presents with    Follow-up       HPI:      73-year-old male with past medical history prior normocytic anemia, tobacco, ANTONIA, eosinophilic asthma/COPD with chronic respiratory failure steroid dependent/asbestos exposure, hyperlipidemia, hypertension, coronary artery calcification by CT, echocardiogram EF 60% mild LVH normal diastolic function normal RV normal PASP normal CVP prior echocardiogram 2017 normal EF grade 1 diastolic dysfunction normal RV    Patient of Dr. Glez for evaluation of mitral valve prolapse  Presents with outside echocardiogram report 4/2023 indicating mitral valve prolapse hyperdynamic EF  The patient does have chronic stable dyspnea on exertion attributed to COPD  The patient took Lasix daily for lower extremity edema    Previously added amlodipine to losartan and furosemide and ordered echocardiogram with BNP which was normal  Last visit check labs there was hypernatremia hyperkalemia with normal creatinine  Suggested patient stop Lasix and potassium and decrease losartan in addition to increasing amlodipine  Subsequently told the patient he may take Lasix p.r.n. infrequently without potassium  Check labs still mild hyperkalemia decrease losartan dose and added HCTZ discontinued Lasix (he was not taking potassium)  Last visit added hydralazine the addition to continuing to hold Lasix  Checked BMP improved      Medications  Current Outpatient Medications   Medication Sig Dispense Refill    albuterol (VENTOLIN HFA) 90 mcg/actuation inhaler Inhale 2 puffs into the lungs every 4 (four) hours as needed for Wheezing or Shortness of Breath. Rescue 18 g 2    albuterol-ipratropium (DUO-NEB) 2.5 mg-0.5 mg/3 mL nebulizer solution Take 3 mLs by nebulization every 6 (six) hours as needed for Wheezing or Shortness of Breath. Rescue 360 mL 5    amLODIPine (NORVASC) 10 MG tablet Take 1 tablet (10 mg  total) by mouth once daily. 90 tablet 1    aspirin 81 MG Chew Take 1 tablet (81 mg total) by mouth once daily.      atorvastatin (LIPITOR) 40 MG tablet TAKE ONE TABLET BY MOUTH DAILY. 90 tablet 1    budesonide (PULMICORT) 0.5 mg/2 mL nebulizer solution Take 2 mLs (0.5 mg total) by nebulization 2 (two) times daily as needed (shortness of breath). 120 mL 1    budesonide-glycopyr-formoterol (BREZTRI AEROSPHERE) 160-9-4.8 mcg/actuation HFAA Inhale 2 puffs into the lungs 2 (two) times a day. 10.7 g 2    hydrALAZINE (APRESOLINE) 10 MG tablet Take 1 tablet (10 mg total) by mouth 3 (three) times daily. 270 tablet 1    hydroCHLOROthiazide (HYDRODIURIL) 25 MG tablet Take 1 tablet (25 mg total) by mouth once daily. 90 tablet 3    losartan (COZAAR) 25 MG tablet Take 1 tablet (25 mg total) by mouth once daily. 90 tablet 3    magnesium oxide 500 mg Tab Take tablet by mouth once daily. 90 each 0    tiZANidine (ZANAFLEX) 4 MG tablet Take 1 tablet by mouth twice daily as needed. 180 tablet 0    mucus clearing device (ACAPELLA, FLUTTER) 1 Device by Misc.(Non-Drug; Combo Route) route 2 (two) times daily. 1 each 0    predniSONE (DELTASONE) 20 MG tablet TAKE 1 TABLET BY MOUTH 2 to 3 TIMES WEEKLY AS NEEDED 21 tablet 2     No current facility-administered medications for this visit.        History  Past Medical History:   Diagnosis Date    COPD (chronic obstructive pulmonary disease)     Hyperlipidemia     Hypertension     Obstructive sleep apnea     Unspecified glaucoma      Past Surgical History:   Procedure Laterality Date    INGUINAL HERNIA REPAIR Left     SHOULDER ARTHROSCOPY Right     WRIST SURGERY Left      Social History     Socioeconomic History    Marital status:    Tobacco Use    Smoking status: Former     Current packs/day: 0.00     Average packs/day: 2.0 packs/day for 40.0 years (80.0 ttl pk-yrs)     Types: Cigarettes     Start date: 3/24/1969     Quit date: 3/24/2009     Years since quittin.8    Smokeless  tobacco: Never   Substance and Sexual Activity    Alcohol use: Yes     Comment: 6-12 beers/night; some days none.    Drug use: No     Social Determinants of Health     Financial Resource Strain: Low Risk  (1/31/2024)    Overall Financial Resource Strain (CARDIA)     Difficulty of Paying Living Expenses: Not very hard   Food Insecurity: No Food Insecurity (1/31/2024)    Hunger Vital Sign     Worried About Running Out of Food in the Last Year: Never true     Ran Out of Food in the Last Year: Never true   Transportation Needs: No Transportation Needs (1/31/2024)    PRAPARE - Transportation     Lack of Transportation (Medical): No     Lack of Transportation (Non-Medical): No   Physical Activity: Inactive (1/31/2024)    Exercise Vital Sign     Days of Exercise per Week: 0 days     Minutes of Exercise per Session: 0 min   Stress: No Stress Concern Present (1/31/2024)    [employer] [place] [employer] - Occupational Stress Questionnaire     Feeling of Stress : Not at all   Social Connections: Unknown (1/31/2024)    Social Connection and Isolation Panel [NHANES]     Frequency of Communication with Friends and Family: Once a week     Frequency of Social Gatherings with Friends and Family: Never     Active Member of Clubs or Organizations: No     Attends Club or Organization Meetings: Never     Marital Status:    Housing Stability: Low Risk  (1/31/2024)    Housing Stability Vital Sign     Unable to Pay for Housing in the Last Year: No     Number of Places Lived in the Last Year: 1     Unstable Housing in the Last Year: No     Family History   Problem Relation Age of Onset    Cancer Mother     Asthma Mother     Heart disease Father     COPD Father     Cancer Father     Cancer Brother         colon cancer    No Known Problems Brother     No Known Problems Brother         Allergies  Review of patient's allergies indicates:  No Known Allergies    Review of Systems   Review of Systems   Constitutional: Negative  for fever.   HENT:  Negative for nosebleeds.    Eyes:  Negative for visual disturbance.   Cardiovascular:  Positive for dyspnea on exertion. Negative for chest pain, claudication, palpitations and syncope.   Respiratory:  Negative for cough, hemoptysis and wheezing.    Endocrine: Negative for cold intolerance, heat intolerance, polyphagia and polyuria.   Hematologic/Lymphatic: Negative for bleeding problem.   Skin:  Negative for rash.   Musculoskeletal:  Negative for myalgias.   Gastrointestinal:  Negative for hematemesis, hematochezia, nausea and vomiting.   Genitourinary:  Negative for dysuria.   Neurological:  Negative for focal weakness and sensory change.   Psychiatric/Behavioral:  Negative for altered mental status.        Physical Exam  Vitals:    01/31/24 1011   BP: (!) 156/84   Pulse: 74     Wt Readings from Last 1 Encounters:   01/31/24 80.7 kg (177 lb 12.8 oz)     Physical Exam  HENT:      Head: Normocephalic and atraumatic.      Mouth/Throat:      Mouth: Mucous membranes are moist.   Eyes:      Extraocular Movements: Extraocular movements intact.      Pupils: Pupils are equal, round, and reactive to light.   Neck:      Vascular: No carotid bruit or JVD.   Cardiovascular:      Rate and Rhythm: Normal rate and regular rhythm.      Pulses: Normal pulses and intact distal pulses.      Heart sounds: Normal heart sounds. No murmur heard.     No friction rub. No gallop.   Pulmonary:      Effort: Pulmonary effort is normal.      Breath sounds: Normal breath sounds.   Abdominal:      Tenderness: There is no abdominal tenderness. There is no guarding or rebound.   Musculoskeletal:      Right lower leg: Edema present.      Left lower leg: Edema present.      Comments: Trace bilateral lower extremity edema   Skin:     General: Skin is warm and dry.      Capillary Refill: Capillary refill takes less than 2 seconds.   Neurological:      General: No focal deficit present.      Mental Status: He is alert and oriented to  person, place, and time.   Psychiatric:         Mood and Affect: Mood normal.         Labs  Lab Visit on 12/27/2023   Component Date Value Ref Range Status    Sodium 12/27/2023 142  136 - 145 mmol/L Final    Potassium 12/27/2023 4.3  3.5 - 5.1 mmol/L Final    Chloride 12/27/2023 106  95 - 110 mmol/L Final    CO2 12/27/2023 27  23 - 29 mmol/L Final    Glucose 12/27/2023 104  70 - 110 mg/dL Final    BUN 12/27/2023 10  8 - 23 mg/dL Final    Creatinine 12/27/2023 0.8  0.5 - 1.4 mg/dL Final    Calcium 12/27/2023 9.3  8.7 - 10.5 mg/dL Final    Anion Gap 12/27/2023 9  8 - 16 mmol/L Final    eGFR 12/27/2023 >60.0  >60 mL/min/1.73 m^2 Final   Lab Visit on 09/20/2023   Component Date Value Ref Range Status    Sodium 09/20/2023 140  136 - 145 mmol/L Final    Potassium 09/20/2023 5.3 (H)  3.5 - 5.1 mmol/L Final    Chloride 09/20/2023 104  95 - 110 mmol/L Final    CO2 09/20/2023 28  23 - 29 mmol/L Final    Glucose 09/20/2023 105  70 - 110 mg/dL Final    BUN 09/20/2023 14  8 - 23 mg/dL Final    Creatinine 09/20/2023 0.9  0.5 - 1.4 mg/dL Final    Calcium 09/20/2023 9.5  8.7 - 10.5 mg/dL Final    Anion Gap 09/20/2023 8  8 - 16 mmol/L Final    eGFR 09/20/2023 >60.0  >60 mL/min/1.73 m^2 Final       EKG  05/02/2023 normal sinus rhythm normal rate several months specific ST-T changes  09/20/2023 normal sinus rhythm normal rate abnormal precordial R-wave progression nonspecific ST-T changes      Echo   Results for orders placed or performed during the hospital encounter of 05/16/23   Echo   Result Value Ref Range    BSA 1.99 m2    TDI SEPTAL 0.09 m/s    LV LATERAL E/E' RATIO 7.80 m/s    LV SEPTAL E/E' RATIO 8.67 m/s    IVC diameter 1.45 cm    Left Ventricular Outflow Tract Mean Velocity 0.69 cm/s    Left Ventricular Outflow Tract Mean Gradient 2.12 mmHg    AORTIC VALVE CUSP SEPERATION 1.84 cm    TDI LATERAL 0.10 m/s    PV PEAK VELOCITY 1.09 cm/s    LVIDd 4.00 3.5 - 6.0 cm    IVS 1.28 0.6 - 1.1 cm    Posterior Wall 1.30 0.6 - 1.1 cm     Ao root annulus 3.06 cm    LVIDs 3.59 2.1 - 4.0 cm    FS 10 28 - 44 %    Sinus 3.06 cm    STJ 2.92 cm    LV mass 184.38 g    LA size 3.14 cm    RVDD 2.65 cm    Left Ventricle Relative Wall Thickness 0.65 cm    AV mean gradient 4 mmHg    AV valve area 1.65 cm2    AV Velocity Ratio 0.66     AV index (prosthetic) 0.58     MV mean gradient 1 mmHg    MV valve area p 1/2 method 4.19 cm2    MV valve area by continuity eq 1.52 cm2    E/A ratio 0.98     Mean e' 0.10 m/s    E wave deceleration time 195.92 msec    LVOT diameter 1.91 cm    LVOT area 2.9 cm2    LVOT peak basil 0.95 m/s    LVOT peak VTI 19.40 cm    Ao peak basil 1.43 m/s    Ao VTI 33.6 cm    LVOT stroke volume 55.56 cm3    AV peak gradient 8 mmHg    MV peak gradient 4 mmHg    E/E' ratio 8.21 m/s    MV Peak E Basil 0.78 m/s    TR Max Basil 2.47 m/s    MV VTI 36.6 cm    MV stenosis pressure 1/2 time 52.49 ms    MV Peak A Basil 0.80 m/s    LV Systolic Volume 53.99 mL    LV Systolic Volume Index 27.5 mL/m2    LV Diastolic Volume 97.19 mL    LV Diastolic Volume Index 49.59 mL/m2    LV Mass Index 94 g/m2    RA Major Axis 4.77 cm    Left Atrium Minor Axis 3.54 cm    Left Atrium Major Axis 5.73 cm    Triscuspid Valve Regurgitation Peak Gradient 24 mmHg    LA Volume Index (Mod) 31.1 mL/m2    LA volume (mod) 61.00 cm3    RA Width 3.42 cm    Right Atrial Pressure (from IVC) 3 mmHg    EF 60 %    TV resting pulmonary artery pressure 27 mmHg    Narrative    · The left ventricle is normal in size with mild concentric hypertrophy   and normal systolic function.  · The estimated ejection fraction is 60%.  · Normal left ventricular diastolic function.  · Normal right ventricular size with normal right ventricular systolic   function.  · The estimated PA systolic pressure is at least 27 mmHg.  · Normal central venous pressure (3 mmHg).          Imaging  No results found.    Prior coronary angiogram / intervention:  No known    Assessment and Plan  1. Aortic atherosclerosis  Patient is on  aspirin and statin    2. Hyperlipidemia, unspecified hyperlipidemia type  Continue statin    3. Hypertension, unspecified type  Continue amlodipine, losartan, hydrochlorothiazide, hydralazine  Make hydralazine 25 t.i.d.  Holding Lasix    4. Mitral valve prolapse  Not significant on echocardiogram  Repeated as above    5. SHOEMAKER (dyspnea on exertion)  Attributed to lung disease  Chronic stable    6. Coronary atherosclerosis by CT  Aspirin high-dose statin    7. Lower extremity edema  Holding Lasix  No obvious cardiac etiology    Follow Up  Three months  Blood pressure check nurse visit 1 week    Total professional time spent for the encounter: 30 minutes  Time was spent preparing to see the patient, reviewing results of prior testing, obtaining and/or reviewing separately obtained history, performing a medically appropriate examination and interview, counseling and educating the patient/family, ordering medications/tests/procedures, referring and communicating with other health care professionals, documenting clinical information in the electronic health record, and independently interpreting results.     Reji Angulo MD, FACC, RPVI  Interventional Cardiology

## 2024-02-02 DIAGNOSIS — J43.9 PULMONARY EMPHYSEMA, UNSPECIFIED EMPHYSEMA TYPE: ICD-10-CM

## 2024-02-02 DIAGNOSIS — Z92.241 STEROID-DEPENDENT CHRONIC OBSTRUCTIVE PULMONARY DISEASE: ICD-10-CM

## 2024-02-02 DIAGNOSIS — J44.9 STEROID-DEPENDENT CHRONIC OBSTRUCTIVE PULMONARY DISEASE: ICD-10-CM

## 2024-02-02 RX ORDER — ALBUTEROL SULFATE 90 UG/1
2 AEROSOL, METERED RESPIRATORY (INHALATION) EVERY 4 HOURS PRN
Qty: 18 G | Refills: 2 | OUTPATIENT
Start: 2024-02-02

## 2024-02-06 ENCOUNTER — CLINICAL SUPPORT (OUTPATIENT)
Dept: CARDIOLOGY | Facility: CLINIC | Age: 74
End: 2024-02-06
Payer: MEDICARE

## 2024-02-06 VITALS — HEART RATE: 84 BPM | SYSTOLIC BLOOD PRESSURE: 148 MMHG | DIASTOLIC BLOOD PRESSURE: 65 MMHG

## 2024-02-06 DIAGNOSIS — I10 HYPERTENSION, UNSPECIFIED TYPE: Primary | ICD-10-CM

## 2024-02-06 PROCEDURE — 99999 PR PBB SHADOW E&M-EST. PATIENT-LVL III: CPT | Mod: PBBFAC,,,

## 2024-02-06 NOTE — PROGRESS NOTES
2 week follow up BP check for Dr. Angulo.  Patient is currently taking amlodipine 10 mg, Losartan 25 mg, and HCTZ 25 mg.  Patient denies N/V, headaches, dizziness, blurred vision.      Dr. Angulo advised of visit via chart.  Advised patient, office will contact him with provider recommendations.  Patient verbalized understanding.

## 2024-03-13 ENCOUNTER — OFFICE VISIT (OUTPATIENT)
Dept: ORTHOPEDICS | Facility: CLINIC | Age: 74
End: 2024-03-13
Payer: MEDICARE

## 2024-03-13 VITALS
DIASTOLIC BLOOD PRESSURE: 68 MMHG | WEIGHT: 176.69 LBS | HEIGHT: 67 IN | SYSTOLIC BLOOD PRESSURE: 156 MMHG | BODY MASS INDEX: 27.73 KG/M2

## 2024-03-13 DIAGNOSIS — M25.512 ACUTE PAIN OF LEFT SHOULDER: Primary | ICD-10-CM

## 2024-03-13 PROCEDURE — 3008F BODY MASS INDEX DOCD: CPT | Mod: CPTII,S$GLB,, | Performed by: STUDENT IN AN ORGANIZED HEALTH CARE EDUCATION/TRAINING PROGRAM

## 2024-03-13 PROCEDURE — 99204 OFFICE O/P NEW MOD 45 MIN: CPT | Mod: S$GLB,,, | Performed by: STUDENT IN AN ORGANIZED HEALTH CARE EDUCATION/TRAINING PROGRAM

## 2024-03-13 PROCEDURE — 3078F DIAST BP <80 MM HG: CPT | Mod: CPTII,S$GLB,, | Performed by: STUDENT IN AN ORGANIZED HEALTH CARE EDUCATION/TRAINING PROGRAM

## 2024-03-13 PROCEDURE — 3077F SYST BP >= 140 MM HG: CPT | Mod: CPTII,S$GLB,, | Performed by: STUDENT IN AN ORGANIZED HEALTH CARE EDUCATION/TRAINING PROGRAM

## 2024-03-13 PROCEDURE — 99999 PR PBB SHADOW E&M-EST. PATIENT-LVL IV: CPT | Mod: PBBFAC,,, | Performed by: STUDENT IN AN ORGANIZED HEALTH CARE EDUCATION/TRAINING PROGRAM

## 2024-03-13 PROCEDURE — 1160F RVW MEDS BY RX/DR IN RCRD: CPT | Mod: CPTII,S$GLB,, | Performed by: STUDENT IN AN ORGANIZED HEALTH CARE EDUCATION/TRAINING PROGRAM

## 2024-03-13 PROCEDURE — 1159F MED LIST DOCD IN RCRD: CPT | Mod: CPTII,S$GLB,, | Performed by: STUDENT IN AN ORGANIZED HEALTH CARE EDUCATION/TRAINING PROGRAM

## 2024-03-13 PROCEDURE — 1125F AMNT PAIN NOTED PAIN PRSNT: CPT | Mod: CPTII,S$GLB,, | Performed by: STUDENT IN AN ORGANIZED HEALTH CARE EDUCATION/TRAINING PROGRAM

## 2024-03-13 RX ORDER — IBUPROFEN 800 MG/1
800 TABLET ORAL 3 TIMES DAILY
Qty: 90 TABLET | Refills: 1 | Status: SHIPPED | OUTPATIENT
Start: 2024-03-13

## 2024-03-13 NOTE — PROGRESS NOTES
CC: left shoulder pain    73 y.o. Male presents today for evaluation of his left shoulder pain. Pt reports gradual onset of left-sided chest and left shoulder pain about 1 month ago. Pt reports pain has worsened since then. Pt reports pain is 8-9/10 today. Pt localizes pain to left-sided chest with referred pain into anterior left shoulder and anterior upper arm. Pt denies mechanical symptoms. Pt repots intermittent tingling into anterior upper arm.     Hand dominance: Right     SYMPTOMS:   Pain Score: 8-9/10  Pain location: left-sided chest, anterior shoulder, anterior upper arm  Time of onset: 1 month  Trauma, injury: gradual onset    Nocturnal pain: no  Weakness: no  Clicking, catching: no  Neck problems: no    INTERVENTIONS:   Medications tried: percocet (no relief)  Physical therapy: none  Injections: none    RELEVANT HISTORY:   Imaging to date: 2/22/24 (chest XR)  Previous significant shoulder/arm injuries: none on left   Previous shoulder surgeries: prev hx of R rotator cuff repair 10 yrs ago    Occupation: retired     REVIEW OF SYSTEMS:   Constitution: Patient denies fever or chills.  Eyes: Patient denies eye pain or vision changes.  HEENT: Patient denies ear pain, sore throat, or nasal discharge.  CVS: Patient denies chest pain.  Lungs: Patient denies cough. Pt reports shortness of breath.  Abdomen: Patient denies any stomach pain, nausea, vomiting, or diarrhea  Skin: Patient denies skin rash or itching.    Musculoskeletal: Patient denies recent injuries or trauma.  Neuro: Patient denies any numbness or tingling in lower extremities.  Psych: Patient denies any current anxiety or nervousness.    PAST MEDICAL HISTORY:   Past Medical History:   Diagnosis Date    COPD (chronic obstructive pulmonary disease)     Hyperlipidemia     Hypertension     Obstructive sleep apnea     Unspecified glaucoma        PAST SURGICAL HISTORY:  Past Surgical History:   Procedure Laterality Date    INGUINAL HERNIA REPAIR Left      SHOULDER ARTHROSCOPY Right     WRIST SURGERY Left        FAMILY HISTORY:  Family History   Problem Relation Age of Onset    Cancer Mother     Asthma Mother     Heart disease Father     COPD Father     Cancer Father     Cancer Brother         colon cancer    No Known Problems Brother     No Known Problems Brother        SOCIAL HISTORY:  Social History     Socioeconomic History    Marital status:    Tobacco Use    Smoking status: Former     Current packs/day: 0.00     Average packs/day: 2.0 packs/day for 40.0 years (80.0 ttl pk-yrs)     Types: Cigarettes     Start date: 3/24/1969     Quit date: 3/24/2009     Years since quittin.9    Smokeless tobacco: Never   Substance and Sexual Activity    Alcohol use: Yes     Comment: 6-12 beers/night; some days none.    Drug use: No     Social Determinants of Health     Financial Resource Strain: Low Risk  (2024)    Overall Financial Resource Strain (CARDIA)     Difficulty of Paying Living Expenses: Not very hard   Food Insecurity: No Food Insecurity (2024)    Hunger Vital Sign     Worried About Running Out of Food in the Last Year: Never true     Ran Out of Food in the Last Year: Never true   Transportation Needs: No Transportation Needs (2024)    PRAPARE - Transportation     Lack of Transportation (Medical): No     Lack of Transportation (Non-Medical): No   Physical Activity: Inactive (2024)    Exercise Vital Sign     Days of Exercise per Week: 0 days     Minutes of Exercise per Session: 0 min   Stress: No Stress Concern Present (2024)    [place] [employer] - Occupational Stress Questionnaire     Feeling of Stress : Not at all   Social Connections: Unknown (2024)    Social Connection and Isolation Panel [NHANES]     Frequency of Communication with Friends and Family: Once a week     Frequency of Social Gatherings with Friends and Family: Never     Active Member of Clubs or Organizations: No     Attends Club or  Organization Meetings: Never     Marital Status:    Housing Stability: Low Risk  (1/31/2024)    Housing Stability Vital Sign     Unable to Pay for Housing in the Last Year: No     Number of Places Lived in the Last Year: 1     Unstable Housing in the Last Year: No       MEDICATIONS:     Current Outpatient Medications:     albuterol (VENTOLIN HFA) 90 mcg/actuation inhaler, Inhale 2 puffs into the lungs every 4 (four) hours as needed for Wheezing or Shortness of Breath. Rescue, Disp: 18 g, Rfl: 2    amLODIPine (NORVASC) 10 MG tablet, Take 1 tablet (10 mg total) by mouth once daily., Disp: 90 tablet, Rfl: 3    aspirin 81 MG Chew, Take 1 tablet (81 mg total) by mouth once daily., Disp: , Rfl:     atorvastatin (LIPITOR) 40 MG tablet, TAKE ONE TABLET BY MOUTH DAILY., Disp: 90 tablet, Rfl: 1    budesonide-glycopyr-formoterol (BREZTRI AEROSPHERE) 160-9-4.8 mcg/actuation HFAA, Inhale 2 puffs into the lungs 2 (two) times a day., Disp: 10.7 g, Rfl: 2    hydrALAZINE (APRESOLINE) 25 MG tablet, Take 1 tablet (25 mg total) by mouth 3 (three) times daily., Disp: 270 tablet, Rfl: 1    hydroCHLOROthiazide (HYDRODIURIL) 25 MG tablet, Take 1 tablet (25 mg total) by mouth once daily., Disp: 90 tablet, Rfl: 3    losartan (COZAAR) 25 MG tablet, Take 1 tablet (25 mg total) by mouth once daily., Disp: 90 tablet, Rfl: 3    magnesium oxide 500 mg Tab, Take tablet by mouth once daily., Disp: 90 each, Rfl: 0    mucus clearing device (ACAPELLA, FLUTTER), 1 Device by Misc.(Non-Drug; Combo Route) route 2 (two) times daily., Disp: 1 each, Rfl: 0    predniSONE (DELTASONE) 20 MG tablet, TAKE 1 TABLET BY MOUTH 2 to 3 TIMES WEEKLY AS NEEDED, Disp: 21 tablet, Rfl: 2    tiZANidine (ZANAFLEX) 4 MG tablet, Take 1 tablet by mouth twice daily as needed., Disp: 180 tablet, Rfl: 0    albuterol-ipratropium (DUO-NEB) 2.5 mg-0.5 mg/3 mL nebulizer solution, Take 3 mLs by nebulization every 6 (six) hours as needed for Wheezing or Shortness of Breath.  "Rescue, Disp: 360 mL, Rfl: 5    budesonide (PULMICORT) 0.5 mg/2 mL nebulizer solution, Take 2 mLs (0.5 mg total) by nebulization 2 (two) times daily as needed (shortness of breath)., Disp: 120 mL, Rfl: 1    ibuprofen (ADVIL,MOTRIN) 800 MG tablet, Take 1 tablet (800 mg total) by mouth 3 (three) times daily., Disp: 90 tablet, Rfl: 1    ALLERGIES:   Review of patient's allergies indicates:  No Known Allergies     X-Ray Chest PA And Lateral  Narrative: EXAMINATION:  XR CHEST PA AND LATERAL    CLINICAL HISTORY:  Pleurodynia    TECHNIQUE:  PA and lateral views of the chest were performed.    COMPARISON:  02/15/2022    FINDINGS:  Lungs are deeply expanded compatible with emphysema.The lungs are clear, with normal appearance of pulmonary vasculature and no pleural effusion or pneumothorax.    The cardiac silhouette is normal in size.  There are aortic calcifications.  The hilar and mediastinal contours are unremarkable.    Visualized osseous structures show no acute abnormalities.  Impression: No acute radiographic findings in the chest.    Emphysema.    Electronically signed by: Chaitanya Pedraza MD  Date:    02/22/2024  Time:    11:20  \  PHYSICAL EXAMINATION:  BP (!) 156/68   Ht 5' 7" (1.702 m)   Wt 80.2 kg (176 lb 11.2 oz)   BMI 27.67 kg/m²   Vitals signs and nursing note have been reviewed.    General: In no acute distress, well developed, well nourished, no diaphoresis  Eyes: EOM full and smooth, no eye redness or discharge  HEENT: normocephalic and atraumatic, neck supple, trachea midline, no nasal discharge  Cardiovascular: no LE edema  Lungs: respirations non-labored, no conversational dyspnea   Neuro: AAOx3, CN2-12 grossly intact  Skin: No rashes, warm and dry  Psychiatric: cooperative, pleasant, mood and affect appropriate for age    Left Shoulder:  INSPECTION / PALPATION:  -Deformity   -Ecchymosis   -Atrophy   -Sulcus sign   -No TTP over anatomy including clavicle, scapular spine, ACJ, acromion, supraspinatus, " infraspinatus, bicipital groove     ROM:    Flex to 120° vs 170° contra   Abduct to 80° vs 160° contra   Int rot to waistline vs T7 contra   Ext rot to 50° vs 50° contra  -Scapular dyskinesis     STRENGTH:    Scaption 4/5   Flexion 4/5   Ext rot 5/5   Int rot 5/5   Sup/Pro 5/5    OTHER:   +Painful arc   -Drop arm   -Int lag  -Ext lag  +Neer's   +Cantrell-Nii   +[place] active compression   -Empty Can  +Full Can  -Speed's   -Yergason's  -Apprehension    NECK:   Grossly FROM & painless in neck flex, ext, B/L torsion, B/L lat flexion   -Spurling B/L    Hands NVI B/L      ASSESSMENT:      ICD-10-CM ICD-9-CM   1. Acute pain of left shoulder  M25.512 719.41         PLAN:  Based on patient history, physical exam findings, and imaging I am concerned for internal derangement to the patient's left shoulder which includes rotator cuff pathology.  Given this information, we will move forward with MRI for further evaluation.  We will also prescribe patient ibuprofen 800 mg to be taken up to 3 times a day as needed for pain.    All questions were answered to the best of my ability and all concerns were addressed at this time.    Follow up for above, or sooner if needed.      This note is dictated using the M*Modal Fluency Direct word recognition program. There are word recognition mistakes that are occasionally missed on review.

## 2024-04-02 ENCOUNTER — OFFICE VISIT (OUTPATIENT)
Dept: PULMONOLOGY | Facility: CLINIC | Age: 74
End: 2024-04-02
Payer: MEDICARE

## 2024-04-02 VITALS
BODY MASS INDEX: 27.91 KG/M2 | DIASTOLIC BLOOD PRESSURE: 61 MMHG | SYSTOLIC BLOOD PRESSURE: 156 MMHG | HEART RATE: 75 BPM | WEIGHT: 177.81 LBS | HEIGHT: 67 IN | OXYGEN SATURATION: 97 %

## 2024-04-02 DIAGNOSIS — J44.89 ASTHMA-COPD OVERLAP SYNDROME: ICD-10-CM

## 2024-04-02 DIAGNOSIS — J96.11 CHRONIC RESPIRATORY FAILURE WITH HYPOXIA: ICD-10-CM

## 2024-04-02 DIAGNOSIS — G47.33 OBSTRUCTIVE SLEEP APNEA ON CPAP: ICD-10-CM

## 2024-04-02 DIAGNOSIS — J44.9 STEROID-DEPENDENT COPD: Primary | ICD-10-CM

## 2024-04-02 DIAGNOSIS — Z92.241 STEROID-DEPENDENT COPD: Primary | ICD-10-CM

## 2024-04-02 PROCEDURE — 3077F SYST BP >= 140 MM HG: CPT | Mod: CPTII,S$GLB,, | Performed by: NURSE PRACTITIONER

## 2024-04-02 PROCEDURE — 1126F AMNT PAIN NOTED NONE PRSNT: CPT | Mod: CPTII,S$GLB,, | Performed by: NURSE PRACTITIONER

## 2024-04-02 PROCEDURE — 99213 OFFICE O/P EST LOW 20 MIN: CPT | Mod: S$GLB,,, | Performed by: NURSE PRACTITIONER

## 2024-04-02 PROCEDURE — 3078F DIAST BP <80 MM HG: CPT | Mod: CPTII,S$GLB,, | Performed by: NURSE PRACTITIONER

## 2024-04-02 PROCEDURE — 1159F MED LIST DOCD IN RCRD: CPT | Mod: CPTII,S$GLB,, | Performed by: NURSE PRACTITIONER

## 2024-04-02 PROCEDURE — 3008F BODY MASS INDEX DOCD: CPT | Mod: CPTII,S$GLB,, | Performed by: NURSE PRACTITIONER

## 2024-04-02 PROCEDURE — 4010F ACE/ARB THERAPY RXD/TAKEN: CPT | Mod: CPTII,S$GLB,, | Performed by: NURSE PRACTITIONER

## 2024-04-02 PROCEDURE — 3288F FALL RISK ASSESSMENT DOCD: CPT | Mod: CPTII,S$GLB,, | Performed by: NURSE PRACTITIONER

## 2024-04-02 PROCEDURE — 1101F PT FALLS ASSESS-DOCD LE1/YR: CPT | Mod: CPTII,S$GLB,, | Performed by: NURSE PRACTITIONER

## 2024-04-02 PROCEDURE — 99999 PR PBB SHADOW E&M-EST. PATIENT-LVL IV: CPT | Mod: PBBFAC,,, | Performed by: NURSE PRACTITIONER

## 2024-04-02 RX ORDER — IPRATROPIUM BROMIDE AND ALBUTEROL SULFATE 2.5; .5 MG/3ML; MG/3ML
3 SOLUTION RESPIRATORY (INHALATION) EVERY 6 HOURS PRN
Qty: 360 ML | Refills: 5 | Status: SHIPPED | OUTPATIENT
Start: 2024-04-02 | End: 2025-04-02

## 2024-04-02 RX ORDER — PREDNISONE 5 MG/1
10 TABLET ORAL DAILY
Qty: 180 TABLET | Refills: 3 | Status: SHIPPED | OUTPATIENT
Start: 2024-04-02

## 2024-04-02 RX ORDER — ATORVASTATIN CALCIUM 80 MG/1
80 TABLET, FILM COATED ORAL
COMMUNITY
Start: 2024-02-27

## 2024-04-02 NOTE — PROGRESS NOTES
4/2/2024    Brad Nowak  Office Note    Chief Complaint   Patient presents with    Medication Refill    Shortness of Breath       HPI:    4/2/2024- currently on Breztri daily, using prednisone 20 mg three days weekly, preferred 10 mg daily.   Wearing CPAP as needed, had dry mouth from wearing, no interested in wearing.   Wearing supplemental oxygen at night at 3L and during the day as needed. Able to cut grass but has to take breaks and using albuterol as needed. Using albuterol on average 3 times weekly, no nocturnal coughing  Has occasional cough that is occasionally productive grey color.     Tried biologics in past but not interested in resuming.     8/10/2022- still trying to use CPAP, complaint of dry mouth nightly. Wearing oxygen at home, using [facility] for supplemental oxygen with problems with bills. Did not receive the heated tubing ordered in May.   Cough and SOB- stable, most days, mild cough, SOB limits activity level, stays inside due to hot weather.     5/10/2022- has CPAP tried wearing but it wakes him up with dry mouth after 1 hour, is sleeping with oxygen only. Already has humidity.   Cough is resolved with aerobika and nebulizer treatments 2x daily, Helps him clear more mucous.    SOB severe complaint, difficult to walk out side of home with out stopping to rest. SOB after showing, improves with rest, has supplemental oxygen at 3L with benefit.   On prednisone 10 mg daily with 20 mg as needed.     2/9/22- received new CPAP following recall of original in past 2 weeks, wearing nightly with benefit; does not have supplemental oxygen.    SOB- worsening with time, difficult to do any exertion with out stopping to rest. Using albuterol inhaler 2x weekly with little benefit, severe complaint, worse in very cold weather. Difficult to leave home due to SOB.   Using nebulizer as needed. States nebulizer does not help with breathing.     No weightloss, chest tightness, wheeze or  night sweating,  occasional cough non productive.       8/9/2021- fell at home fractured ribs 7/25/21. States pain is recurrent, worse with movement. Tx with pain medication, 10 on 0-10 scale with movement. Improves with pain medication with no current pain.  SOB- unchanged. Daily, worse after taking shower and cutting grass, improves with rest, able to do activities of daily living. On prednisone 10 mg daily. Taking 20 mg one day weekly. Associated with occasional non productive cough.  No chest tightness or wheezing. Has CPAP, not wearing due to CPAP causes dry mouth that is uncomfortable. States he breaths better with out it.     2/9/21- wearing CPAP nightly with some benefit. Did not receive the new mask as ordered. Mask is staying on all night. Waking up every night 1-2 x nightly around 1 or 2 pm.   SOB- unchanged, daily, can still perform tasks but has to move slowly, currently busy doing home repairs. Currently on Prednisone 10 mg daily with 20 mg once a week.   No cough, chest tightness, or wheeze. Using nebulizer 2x daily.        11/9/2020- states did not receive new mask from [facility], states when he wakes up mask is not correct on face and air is leaking.   Wearing CPAP nightly, states feels about the same.   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 On 5 mg prednisone daily. Using Albuterol 20 mg 2-3 x weekly.   Cough, chest tightness, wheeze- unchanged, daily complaints.     10/15/2020- received CPAP machine from [facility] on Sept 17th, states wearing every night between 9:30- 5 am. States his daytime drowsiness has worsened since starting CPAP, still trying to adjust to wearing CPAP full face mask. States not comfortable. Has air leaking from above nose.   Returned oxygen concentrator to [facility] due to it not working.   Cough- recurrent problem, unchanged, non productive, SOB- unchanged, using Trelegy daily, budesonide daily, albuterol daily, 5 mg prednisone. Has to  stop outdoor chores due to SOB, no limits on indoor activities.       9/9/20- states oxygen concentrator is not working, has turned up to 5 L with very little air coming out of tubing. Titration study completed. Order sent for CPAP.   Wanting to change from [facility] to different DME company.     SOB- states felt better the morning after CPAP titration, worse with exertion. No improvement on supplemental oxygen, improves with rest.   Associated with chest tightness that improves with prednisone.   Using budesonide daily with only slight improvement in SOB  Cough- recurrent problem, non productive, mild,       8/27/2020- had sleep study, positive results and waiting for Titration study.  No change in fatigue, SOB, daily, chronic complaint, using prednisone as needed on average 2-3 days weekly. Takes for Chest tightness.   Using nebulizer Budesonide 2 times daily with minimal improvement.  No cough, wearing supplemental oxygen at night.     7/15/2020- shortness of breath worsening slowly with time, states harder to breath following pneumonia May 2020 tx in [facility]. SOB severe and hinders his activity level, wearing oxygen only at night 5L. States it does not help much.   Complaint fatigue, daytime drowsiness, sleeping time varies, feeling drowsy in mornings, sleeps alone.  Cough- recurrent problem, occasionally productive light yellow mucous cream colored, no nocturnal arousals, no chest tightness or wheeze.  I05 therapy did not improve breathing, not currently on daily prednisone, using nebulizer as needed 2-3x daily. Trelegy    4/15/2020- SOB- unchanged, daily, worse with exertion, limiting daily activities and slows him down, decreased Prednisone 20 mg once tp twice a week. Has supplemental oxygen at 5L at night.     December 20, 2019- received oxygen concentrator, states does not improve SOB during day, SOB- stable, daily problem, worse with exertion, improves with nebulized Albuterol. Increased Prednisone  himself to 10 mg in am and 40 mg nightly every night for last 3 weeks.      September 20, 2019- SOB worsening, using albuterol nebulizer 1-2 x daily every day. Currently on Prednisone 10 mg daily, with 20mg taper when needed. States having to breath out of mouth to exhale. Stopped Dupixent due to lack of benefit.     June 20, 2019- Dupixent injections started Feb 2019, pt states no benefit, Breathing has worsened. Exercise tolerance has decreased. Currently taking 10 mg prednisone daily, taken prednisone 20 mg taper 5 x in 3 months.    03/20/2019-Breathing worsened in past month, on dupixent since January, Currently on Trelegy 1 puff daily and prednisone 10 mg daily every day.   No fever, no body aches, no pain, no wheeze, not able to do activities with out shortness of breath. Occasional non productive cough.     12/20/18- Breathing worsened, SOB with any exertion, not taking advair due to cost.  On long term prednisone therapy for uncontrolled severe persistent asthma. Eosinophil count artificially low due steroid use.    11/9/18- ER at [place] for chest pain, chest tightness, elevated pulse rate of 130, Discharged  Dx: COPD exacerbation, treated with albuterol nebulizer. States Advair cost to high, $183 month, States recently retired.   Using Duo neb 2x daily, 2-3 albuterol inhaler daily.    9/20/18- pt presents in clinic alone, states taking prednisone 10 mg daily and 20 mg daily twice a week when SOB, has taken 40 mg a day once last week. Uses rescue inhaler most days after climbing stairs at work. Has trouble sleeping. Discussed fasenra and pharmacy unable to contact patient. States he does not carry phone at work during day, does not answer numbers he does not know, and has no voicemail set up at moment. States using Advair Diskus daily. Cough every few days, non productive. Worse at night.      July 5, 2018- on prednisone 5/d with 20/d up to 3 weekly.  Was in hospital December.  Breathing worse, saw  asbestos  and advised 2nd opinion, to see Dr Rodriguez soon.  No fasenra as pharmacy unable to contact pt. Pt ues prednisone 5/d and not using trelegy- has one puff left on 14 day sample given in April and relates trelegy not seems to help.  April 5 - increase prednisone from 5/d to 20/d since last yr - breathing still poor.  Rest good - but not better on prednisone 20/d.  No big improvement on incruse. Sinus better on flonase.     12/28/2017 pt very active, bikes to work with tool boxes with no problem. Had had hospital stays every 6 months til started prednisone continiously  5 yrs ago.  Pt recently flared with recent NSR stay. Pt had pft with fev1 50% at 1.53 liter 12/15/2017.  Very mahmood still.  On breo and singulair.  Use spiriva in past.     From consult last month:Plan:   Pt has eosinophillic asthma and copd.  Pt should respond to singulair.  Higher dose steroid action plan needed.  He would likely do very well with il5 rx.     outpt soon on prednisone 60/d x 7 with taper, going to breo 200, and singulair would be good with current rx.  Would not strop prednisone.  outpt f/u recommended. I do no see asbestos complications.  History of Present Illness:  Cc is sob x 3 days.   Pt worked refinery as .  Stopped smoking yrs ago. FH + asthma with no prior h/o asthma.  Has had cough and wheezes and sob going back prior to [place].  Pt noted needed freq steroids so was kept on prednisone 5 mg daily with need to increase dose ever 2-3 months because of increase sob.  Pt uses breo 100 and xopenex works better than ventolin.  Pt has chr nasal drainage but no bad infections.  abx not used.  He denies spiriva helped in past and denies using daliresp.    Pt rides bike to work - bike weighs 300 lbs with tools for trade.  Pt very active and still works - lives with daughter.    Reviewed note    HPI:      8/21/2018 - Pt with known COPD, asthma (sees Dr Eddy) here for second opinion regarding asbestos  lung disease.  Started working on [employer] in 1968.  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  He started working as a  in 1987 until present time and reports that he has had asbestos exposure.  He denies working directly with asbestos but did work in the vicinity.  He reports that Dr Eddy thinks he has asbestos issues.  Has had PFT but doesn't remember when.  He has chronic SOB, SHOEMAKER (being addressed by Dr Eddy)  He only uses prn inhalers.  Has some chronic cough and wheezing.  Feels that SOB is worse but he rides bike around at work (weights about 200#).        The chief compliant  problem varies with instablilty at time        PFSH:  Past Medical History:   Diagnosis Date    COPD (chronic obstructive pulmonary disease)     Hyperlipidemia     Hypertension     Obstructive sleep apnea     Unspecified glaucoma          Past Surgical History:   Procedure Laterality Date    INGUINAL HERNIA REPAIR Left     SHOULDER ARTHROSCOPY Right     WRIST SURGERY Left      Social History     Tobacco Use    Smoking status: Former     Current packs/day: 0.00     Average packs/day: 2.0 packs/day for 40.0 years (80.0 ttl pk-yrs)     Types: Cigarettes     Start date: 3/24/1969     Quit date: 3/24/2009     Years since quitting: 15.0    Smokeless tobacco: Never   Substance Use Topics    Alcohol use: Yes     Comment: 6-12 beers/night; some days none.    Drug use: No     Family History   Problem Relation Age of Onset    Cancer Mother     Asthma Mother     Heart disease Father     COPD Father     Cancer Father     Cancer Brother         colon cancer    No Known Problems Brother     No Known Problems Brother      Review of patient's allergies indicates:  No Known Allergies  I have reviewed past medical, family, and social history. I have reviewed previous nurse notes.    Performance Status:The patient's activity  "level is household activities.       Review of Systems:  a review of eleven systems covering constitutional, Eye, HEENT, Psych, Respiratory, Cardiac, GI, , Musculoskeletal, Endocrine, Dermatologic was negative except for pertinent findings as listed ABOVE and below: pertinent positive as above, rest is good  Fatigue, shortness of breath,   cough     Exam:Comprehensive exam done. BP (!) 156/61 (BP Location: Left arm, Patient Position: Sitting, BP Method: Medium (Automatic))   Pulse 75   Ht 5' 7" (1.702 m)   Wt 80.7 kg (177 lb 12.8 oz)   SpO2 97% Comment: on room air at rest  BMI 27.85 kg/m²   Exam included Vitals as listed, and patient's appearance and affect and alertness and mood, oral exam for yeast and hygiene and pharynx lesions and Mallapatti (M) score, neck with inspection for jvd and masses and thyroid abnormalities and lymph nodes (supraclavicular and infraclavicular nodes and axillary also examined and noted if abn), chest exam included symmetry and effort and fremitus and percussion and auscultation, cardiac exam included rhythm and gallops and murmur and rubs and jvd and edema, abdominal exam for mass and hepatosplenomegaly and tenderness and hernias and bowel sounds, Musculoskeletal exam with muscle tone and posture and mobility/gait and  strength, and skin for rashes and cyanosis and pallor and turgor, extremity for clubbing.  Findings were normal except for pertinent findings listed below: M2; BS diminished bilaterally         Radiographs (ct chest and cxr) reviewed: results reviewed by direct vision    CT chest 02/15/2022   1. Pulmonary emphysema and coronary artery disease. 6 cm bleb at the right lung apex.  2. Left pulmonary nodule. In a low risk patient, no further follow-up is recommended.  In a high-risk patient, 12 month follow-up CT could be considered.      CTA Chest Non-Coronary 05/04/2020   Limited CT pulmonary angiogram without evidence for definite filling defects within the " "adequately opacified portions of the pulmonary arteries.  Airspace consolidation in the right lung base and to a lesser extent in the left lung base may represent areas of pneumonia.  Thickening of the wall of the mid to distal esophagus.  This could be further assessed with upper endoscopy or an esophagram to evaluate the clinical significance of this finding.  Mildly prominent lymph nodes in the subcarinal region.  Diffuse fatty change throughout the liver.  Emphysematous changes in the lungs bilaterally.    X-Ray Chest AP Portable 05/04/2020   The heart size is within normal limits.  There are calcifications in the aortic arch.  There are linear foci of atelectasis or parenchymal scarring in the lung bases bilaterally.  There are no pleural effusions.  The osseous structures are intact.    CT Chest Without Contrast 12/20/2019   Emphysema.  Atherosclerotic calcification of the aorta, origins of the right and left subclavian arteries and coronary arteries.    CT Chest Without Contrast 07/09/2019  Viewed by direct vision, poor quality images 74 images total not a sufficient study.  The lungs are hyperexpanded with bullous emphysematous changes seen in the upper lobes.  The lower lobes demonstrate prominent interlobar septa.  There are few patchy" bud in tree "appearance seen in the left lower lobe.  There are no pulmonary nodules or suspicious masses.  A small bilateral pleural effusion is present which could be physiologic.    The heart is normal in size however demonstrates coronary artery calcifications.  The tracheobronchial airway is slightly dilated.  There is shotty lymph nodes noted of the mediastinum.  Note is made of calcifications of the thoracic aorta.     XR CHEST 1 VIEW 03/20/2019   Negative chest x-ray     2D ECHO WITH COLOR FLOW DOPPLER 12/15/2017   3 - Normal left ventricular systolic function (EF 65-70%).     4 - Impaired LV relaxation, normal LAP (grade 1 diastolic dysfunction).       Labs " reviewed       Lab Results   Component Value Date    WBC 12.89 (H) 12/24/2022    RBC 3.59 (L) 12/24/2022    HGB 11.8 (L) 12/24/2022    HCT 34.9 (L) 12/24/2022    MCV 97 12/24/2022    MCH 32.9 (H) 12/24/2022    MCHC 33.8 12/24/2022    RDW 12.8 12/24/2022     12/24/2022    MPV 10.4 12/24/2022    GRAN 11.4 (H) 12/24/2022    GRAN 88.3 (H) 12/24/2022    LYMPH 0.6 (L) 12/24/2022    LYMPH 4.3 (L) 12/24/2022    MONO 0.8 12/24/2022    MONO 6.2 12/24/2022    EOS 0.0 12/24/2022    BASO 0.03 12/24/2022    EOSINOPHIL 0.2 12/24/2022    BASOPHIL 0.2 12/24/2022       PFT results reviewed  Pulmonary Functions Testing Results:  PFT results reviewed fev1 50 % at 1.53 with no bd response 12/15/2017      Spirometry bronchodilator, lung volume by gas dilution, diffusion capacity measured July 9, 2019.  The FEV1 to FVC ratio was only 54%, this indicates airflow obstruction.  The FEV1 measured 59% predicted at 1.75 L.  The of 5% improvement in the FEV1   following bronchodilator was not statistically significant, 12% seem to for statistical significance.  Total lung capacity was normal.  Diffusion was low, uncorrected for anemia present, at 61%.     There is  severe airflow obstruction, no bronchodilator response, no restriction, and loss of diffusion measured.  Clinical correlation recommended.     6 min walk study was done February 9, 2021. Baseline room air saturation was 96%. With ambulation O2 sat felt to 92% but no lower. Patient walked about 86% of the reference distance of 425 m.       EPWORTH SLEEPINESS SCALE SCORE 16 on 7/15/2020  Sleep study 8/4/2020= AHI 23.4    6 minute walk study was accomplished February 9, 2022. Baseline room air saturation was 91 percent. With ambulation O2 sat fell to 88 percent by 5 minutes. Patient need 2 liters of oxygen maintain O2 sat in the 90s. At the end of the walk on 2 liters of   oxygen O2 sat was 97 percent. Patient walked 70 percent of the reference distance at 340 meters.        Plan:  Clinical impression is resonably certain and repeated evaluation prn +/- follow up will be needed as below.    Brad was seen today for medication refill and shortness of breath.    Diagnoses and all orders for this visit:    Steroid-dependent COPD  -     predniSONE (DELTASONE) 5 MG tablet; Take 2 tablets (10 mg total) by mouth once daily.  -     mucus clearing device (ACAPELLA, FLUTTER); 1 Device by Misc.(Non-Drug; Combo Route) route 2 (two) times daily.  -     albuterol-ipratropium (DUO-NEB) 2.5 mg-0.5 mg/3 mL nebulizer solution; Take 3 mLs by nebulization every 6 (six) hours as needed for Wheezing or Shortness of Breath. Rescue    Obstructive sleep apnea on CPAP  Comments:  - recommend you restart wearing CPAP nightly with benefit.    Chronic respiratory failure with hypoxia  -     albuterol-ipratropium (DUO-NEB) 2.5 mg-0.5 mg/3 mL nebulizer solution; Take 3 mLs by nebulization every 6 (six) hours as needed for Wheezing or Shortness of Breath. Rescue    Asthma-COPD overlap syndrome  Comments:  - continue current prescription medication regiment          Follow up in about 6 months (around 10/2/2024), or if symptoms worsen or fail to improve.    Discussed with patient above for education the following:      Patient Instructions   We reorder all Asthma/COPD medication regiment    Continue supplemental oxygen     Recommend you restart CPAP for sleep apnea

## 2024-04-02 NOTE — PATIENT INSTRUCTIONS
We reorder all Asthma/COPD medication regiment    Continue supplemental oxygen     Recommend you restart CPAP for sleep apnea

## 2024-05-24 ENCOUNTER — TELEPHONE (OUTPATIENT)
Dept: PULMONOLOGY | Facility: CLINIC | Age: 74
End: 2024-05-24
Payer: MEDICARE

## 2024-05-24 NOTE — TELEPHONE ENCOUNTER
----- Message from Roberta Tolliver sent at 5/24/2024  2:00 PM CDT -----  Contact: self  Type: Needs Medical Advice  Who Called:  pt  Best Call Back Number: [phone]   Additional Information: pt is wanting to know where this prescription for mucus clearing device (ACAPELLA, FLUTTER)  Medication  Date: 4/2/2024 Department: [facility] - Pulmonary Ordering/Authorizing: Nidia Guevara NP    Went to for him to .please call (I do not see it on the chart)

## 2024-09-20 DIAGNOSIS — J96.11 CHRONIC RESPIRATORY FAILURE WITH HYPOXIA: ICD-10-CM

## 2024-09-20 DIAGNOSIS — Z92.241 STEROID-DEPENDENT COPD: ICD-10-CM

## 2024-09-20 DIAGNOSIS — J44.9 STEROID-DEPENDENT COPD: ICD-10-CM

## 2024-09-20 RX ORDER — IPRATROPIUM BROMIDE AND ALBUTEROL SULFATE 2.5; .5 MG/3ML; MG/3ML
3 SOLUTION RESPIRATORY (INHALATION) EVERY 6 HOURS PRN
Qty: 360 ML | Refills: 5 | Status: SHIPPED | OUTPATIENT
Start: 2024-09-20 | End: 2025-09-20

## 2024-10-08 ENCOUNTER — OFFICE VISIT (OUTPATIENT)
Dept: PULMONOLOGY | Facility: CLINIC | Age: 74
End: 2024-10-08
Payer: MEDICARE

## 2024-10-08 VITALS
WEIGHT: 177.13 LBS | BODY MASS INDEX: 27.8 KG/M2 | HEIGHT: 67 IN | SYSTOLIC BLOOD PRESSURE: 177 MMHG | OXYGEN SATURATION: 98 % | DIASTOLIC BLOOD PRESSURE: 78 MMHG | HEART RATE: 66 BPM

## 2024-10-08 DIAGNOSIS — J43.1 PANLOBULAR EMPHYSEMA: ICD-10-CM

## 2024-10-08 DIAGNOSIS — J96.11 CHRONIC RESPIRATORY FAILURE WITH HYPOXIA: ICD-10-CM

## 2024-10-08 DIAGNOSIS — J44.9 CHRONIC OBSTRUCTIVE PULMONARY DISEASE, UNSPECIFIED COPD TYPE: Primary | ICD-10-CM

## 2024-10-08 DIAGNOSIS — Z92.241 STEROID-DEPENDENT CHRONIC OBSTRUCTIVE PULMONARY DISEASE: ICD-10-CM

## 2024-10-08 DIAGNOSIS — G47.33 OBSTRUCTIVE SLEEP APNEA SYNDROME: ICD-10-CM

## 2024-10-08 DIAGNOSIS — J44.9 STEROID-DEPENDENT CHRONIC OBSTRUCTIVE PULMONARY DISEASE: ICD-10-CM

## 2024-10-08 PROCEDURE — 99999 PR PBB SHADOW E&M-EST. PATIENT-LVL IV: CPT | Mod: PBBFAC,,, | Performed by: NURSE PRACTITIONER

## 2024-10-08 PROCEDURE — 3078F DIAST BP <80 MM HG: CPT | Mod: CPTII,S$GLB,, | Performed by: NURSE PRACTITIONER

## 2024-10-08 PROCEDURE — 3008F BODY MASS INDEX DOCD: CPT | Mod: CPTII,S$GLB,, | Performed by: NURSE PRACTITIONER

## 2024-10-08 PROCEDURE — 4010F ACE/ARB THERAPY RXD/TAKEN: CPT | Mod: CPTII,S$GLB,, | Performed by: NURSE PRACTITIONER

## 2024-10-08 PROCEDURE — 99214 OFFICE O/P EST MOD 30 MIN: CPT | Mod: S$GLB,,, | Performed by: NURSE PRACTITIONER

## 2024-10-08 PROCEDURE — 1101F PT FALLS ASSESS-DOCD LE1/YR: CPT | Mod: CPTII,S$GLB,, | Performed by: NURSE PRACTITIONER

## 2024-10-08 PROCEDURE — 1159F MED LIST DOCD IN RCRD: CPT | Mod: CPTII,S$GLB,, | Performed by: NURSE PRACTITIONER

## 2024-10-08 PROCEDURE — 3288F FALL RISK ASSESSMENT DOCD: CPT | Mod: CPTII,S$GLB,, | Performed by: NURSE PRACTITIONER

## 2024-10-08 PROCEDURE — 3077F SYST BP >= 140 MM HG: CPT | Mod: CPTII,S$GLB,, | Performed by: NURSE PRACTITIONER

## 2024-10-08 RX ORDER — PREDNISONE 10 MG/1
TABLET ORAL
COMMUNITY

## 2024-10-08 RX ORDER — BUDESONIDE, GLYCOPYRROLATE, AND FORMOTEROL FUMARATE 160; 9; 4.8 UG/1; UG/1; UG/1
2 AEROSOL, METERED RESPIRATORY (INHALATION) 2 TIMES DAILY
Qty: 32.1 G | Refills: 3 | Status: SHIPPED | OUTPATIENT
Start: 2024-10-08

## 2024-10-08 NOTE — PROGRESS NOTES
10/8/2024    Brad Nowak  Office Note    Chief Complaint   Patient presents with    6m f/u    Medication Refill    Shortness of Breath       HPI:    10/8/2024- using flutter valve Aerobika with nebulizer 2 times weekly, complaint of wheeze, shortness of breath with exertion, and recurrent cough.   Non productive cough.   No longer able to cut grass due to shortness of breath. On supplemental oxygen at 3L. Tried biologic therapy with no benefit. Shortness of breath is worsening with time.   Inhibiting his ability to complete tasks inside and outside home.   On CPAP most nights. Causes dry mouth. Using heated humidity with minimal improvement.     Currently on breztri daily. On prednisone 10 mg daily. States mild improvement.     4/2/2024- currently on Breztri daily, using prednisone 20 mg three days weekly, preferred 10 mg daily.   Wearing CPAP as needed, had dry mouth from wearing, no interested in wearing.   Wearing supplemental oxygen at night at 3L and during the day as needed. Able to cut grass but has to take breaks and using albuterol as needed. Using albuterol on average 3 times weekly, no nocturnal coughing  Has occasional cough that is occasionally productive grey color.     Tried biologics in past but not interested in resuming.     8/10/2022- still trying to use CPAP, complaint of dry mouth nightly. Wearing oxygen at home, using [facility] for supplemental oxygen with problems with bills. Did not receive the heated tubing ordered in May.   Cough and SOB- stable, most days, mild cough, SOB limits activity level, stays inside due to hot weather.     5/10/2022- has CPAP tried wearing but it wakes him up with dry mouth after 1 hour, is sleeping with oxygen only. Already has humidity.   Cough is resolved with aerobika and nebulizer treatments 2x daily, Helps him clear more mucous.    SOB severe complaint, difficult to walk out side of home with out stopping to rest. SOB after showing, improves with rest,  has supplemental oxygen at 3L with benefit.   On prednisone 10 mg daily with 20 mg as needed.     2/9/22- received new CPAP following recall of original in past 2 weeks, wearing nightly with benefit; does not have supplemental oxygen.    SOB- worsening with time, difficult to do any exertion with out stopping to rest. Using albuterol inhaler 2x weekly with little benefit, severe complaint, worse in very cold weather. Difficult to leave home due to SOB.   Using nebulizer as needed. States nebulizer does not help with breathing.     No weightloss, chest tightness, wheeze or  night sweating, occasional cough non productive.       8/9/2021- fell at home fractured ribs 7/25/21. States pain is recurrent, worse with movement. Tx with pain medication, 10 on 0-10 scale with movement. Improves with pain medication with no current pain.  SOB- unchanged. Daily, worse after taking shower and cutting grass, improves with rest, able to do activities of daily living. On prednisone 10 mg daily. Taking 20 mg one day weekly. Associated with occasional non productive cough.  No chest tightness or wheezing. Has CPAP, not wearing due to CPAP causes dry mouth that is uncomfortable. States he breaths better with out it.     2/9/21- wearing CPAP nightly with some benefit. Did not receive the new mask as ordered. Mask is staying on all night. Waking up every night 1-2 x nightly around 1 or 2 pm.   SOB- unchanged, daily, can still perform tasks but has to move slowly, currently busy doing home repairs. Currently on Prednisone 10 mg daily with 20 mg once a week.   No cough, chest tightness, or wheeze. Using nebulizer 2x daily.        11/9/2020- states did not receive new mask from [facility], states when he wakes up mask is not correct on face and air is leaking.   Wearing CPAP nightly, states feels about the same.   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 On 5 mg prednisone daily. Using  Albuterol 20 mg 2-3 x weekly.   Cough, chest tightness, wheeze- unchanged, daily complaints.     10/15/2020- received CPAP machine from [facility] on Sept 17th, states wearing every night between 9:30- 5 am. States his daytime drowsiness has worsened since starting CPAP, still trying to adjust to wearing CPAP full face mask. States not comfortable. Has air leaking from above nose.   Returned oxygen concentrator to [facility] due to it not working.   Cough- recurrent problem, unchanged, non productive, SOB- unchanged, using Trelegy daily, budesonide daily, albuterol daily, 5 mg prednisone. Has to stop outdoor chores due to SOB, no limits on indoor activities.       9/9/20- states oxygen concentrator is not working, has turned up to 5 L with very little air coming out of tubing. Titration study completed. Order sent for CPAP.   Wanting to change from [facility] to different DME company.     SOB- states felt better the morning after CPAP titration, worse with exertion. No improvement on supplemental oxygen, improves with rest.   Associated with chest tightness that improves with prednisone.   Using budesonide daily with only slight improvement in SOB  Cough- recurrent problem, non productive, mild,       8/27/2020- had sleep study, positive results and waiting for Titration study.  No change in fatigue, SOB, daily, chronic complaint, using prednisone as needed on average 2-3 days weekly. Takes for Chest tightness.   Using nebulizer Budesonide 2 times daily with minimal improvement.  No cough, wearing supplemental oxygen at night.     7/15/2020- shortness of breath worsening slowly with time, states harder to breath following pneumonia May 2020 tx in [facility]. SOB severe and hinders his activity level, wearing oxygen only at night 5L. States it does not help much.   Complaint fatigue, daytime drowsiness, sleeping time varies, feeling drowsy in mornings, sleeps alone.  Cough- recurrent problem, occasionally  productive light yellow mucous cream colored, no nocturnal arousals, no chest tightness or wheeze.  I05 therapy did not improve breathing, not currently on daily prednisone, using nebulizer as needed 2-3x daily. Trelegy    4/15/2020- SOB- unchanged, daily, worse with exertion, limiting daily activities and slows him down, decreased Prednisone 20 mg once tp twice a week. Has supplemental oxygen at 5L at night.     December 20, 2019- received oxygen concentrator, states does not improve SOB during day, SOB- stable, daily problem, worse with exertion, improves with nebulized Albuterol. Increased Prednisone himself to 10 mg in am and 40 mg nightly every night for last 3 weeks.      September 20, 2019- SOB worsening, using albuterol nebulizer 1-2 x daily every day. Currently on Prednisone 10 mg daily, with 20mg taper when needed. States having to breath out of mouth to exhale. Stopped Dupixent due to lack of benefit.     June 20, 2019- Dupixent injections started Feb 2019, pt states no benefit, Breathing has worsened. Exercise tolerance has decreased. Currently taking 10 mg prednisone daily, taken prednisone 20 mg taper 5 x in 3 months.    03/20/2019-Breathing worsened in past month, on dupixent since January, Currently on Trelegy 1 puff daily and prednisone 10 mg daily every day.   No fever, no body aches, no pain, no wheeze, not able to do activities with out shortness of breath. Occasional non productive cough.     12/20/18- Breathing worsened, SOB with any exertion, not taking advair due to cost.  On long term prednisone therapy for uncontrolled severe persistent asthma. Eosinophil count artificially low due steroid use.    11/9/18- ER at [facility] for chest pain, chest tightness, elevated pulse rate of 130, Discharged  Dx: COPD exacerbation, treated with albuterol nebulizer. States Advair cost to high, $183 month, States recently retired.   Using Duo neb 2x daily, 2-3 albuterol inhaler daily.    9/20/18- pt  presents in clinic alone, states taking prednisone 10 mg daily and 20 mg daily twice a week when SOB, has taken 40 mg a day once last week. Uses rescue inhaler most days after climbing stairs at work. Has trouble sleeping. Discussed fasenra and pharmacy unable to contact patient. States he does not carry phone at work during day, does not answer numbers he does not know, and has no voicemail set up at moment. States using Advair Diskus daily. Cough every few days, non productive. Worse at night.      July 5, 2018- on prednisone 5/d with 20/d up to 3 weekly.  Was in hospital December.  Breathing worse, saw asbestos  and advised 2nd opinion, to see Dr Rodriguez soon.  No fasenra as pharmacy unable to contact pt. Pt ues prednisone 5/d and not using trelegy- has one puff left on 14 day sample given in April and relates trelegy not seems to help.  April 5 - increase prednisone from 5/d to 20/d since last yr - breathing still poor.  Rest good - but not better on prednisone 20/d.  No big improvement on incruse. Sinus better on flonase.     12/28/2017 pt very active, bikes to work with tool boxes with no problem. Had had hospital stays every 6 months til started prednisone continiously  5 yrs ago.  Pt recently flared with recent NSR stay. Pt had pft with fev1 50% at 1.53 liter 12/15/2017.  Very mahmood still.  On breo and singulair.  Use spiriva in past.     From consult last month:Plan:   Pt has eosinophillic asthma and copd.  Pt should respond to singulair.  Higher dose steroid action plan needed.  He would likely do very well with il5 rx.     outpt soon on prednisone 60/d x 7 with taper, going to breo 200, and singulair would be good with current rx.  Would not strop prednisone.  outpt f/u recommended. I do no see asbestos complications.  History of Present Illness:  Cc is sob x 3 days.   Pt worked [employer] as .  Stopped smoking yrs ago. FH + asthma with no prior h/o asthma.  Has had cough and wheezes and  sob going back prior to [place].  Pt noted needed freq steroids so was kept on prednisone 5 mg daily with need to increase dose ever 2-3 months because of increase sob.  Pt uses breo 100 and xopenex works better than ventolin.  Pt has chr nasal drainage but no bad infections.  abx not used.  He denies spiriva helped in past and denies using daliresp.    Pt rides bike to work - bike weighs 300 lbs with tools for trade.  Pt very active and still works - lives with daughter.    Reviewed note    HPI:      8/21/2018 - Pt with known COPD, asthma (sees Dr Eddy) here for second opinion regarding asbestos lung disease.  Started working on [employer] in 1968.  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  He started working as a  in 1987 until present time and reports that he has had asbestos exposure.  He denies working directly with asbestos but did work in the vicinity.  He reports that Dr Eddy thinks he has asbestos issues.  Has had PFT but doesn't remember when.  He has chronic SOB, SHOEMAKER (being addressed by Dr Eddy)  He only uses prn inhalers.  Has some chronic cough and wheezing.  Feels that SOB is worse but he rides bike around at work (weights about 200#).        The chief compliant  problem is worsening with time.         PFSH:  Past Medical History:   Diagnosis Date    COPD (chronic obstructive pulmonary disease)     Hyperlipidemia     Hypertension     Obstructive sleep apnea     Unspecified glaucoma          Past Surgical History:   Procedure Laterality Date    INGUINAL HERNIA REPAIR Left     SHOULDER ARTHROSCOPY Right     WRIST SURGERY Left      Social History     Tobacco Use    Smoking status: Former     Current packs/day: 0.00     Average packs/day: 2.0 packs/day for 40.0 years (80.0 ttl pk-yrs)     Types: Cigarettes     Start date: 3/24/1969     Quit date: 3/24/2009     Years  "since quitting: 15.5    Smokeless tobacco: Never   Substance Use Topics    Alcohol use: Yes     Comment: 6-12 beers/night; some days none.    Drug use: No     Family History   Problem Relation Name Age of Onset    Cancer Mother Erma     Asthma Mother Erma     Heart disease Father Opy     COPD Father Opy     Cancer Father Opy     Cancer Brother Best         colon cancer    No Known Problems Brother Jarad     No Known Problems Brother Catrachito      Review of patient's allergies indicates:  No Known Allergies  I have reviewed past medical, family, and social history. I have reviewed previous nurse notes.    Performance Status:The patient's activity level is household activities.       Review of Systems:  a review of eleven systems covering constitutional, Eye, HEENT, Psych, Respiratory, Cardiac, GI, , Musculoskeletal, Endocrine, Dermatologic was negative except for pertinent findings as listed ABOVE and below: pertinent positive as above, rest is good  Fatigue, shortness of breath,   cough     Exam:Comprehensive exam done. BP (!) 177/78 (BP Location: Right arm, Patient Position: Sitting)   Pulse 66   Ht 5' 7" (1.702 m)   Wt 80.3 kg (177 lb 2.2 oz)   SpO2 98% Comment: on room air at rest  BMI 27.74 kg/m²   Exam included Vitals as listed, and patient's appearance and affect and alertness and mood, oral exam for yeast and hygiene and pharynx lesions and Mallapatti (M) score, neck with inspection for jvd and masses and thyroid abnormalities and lymph nodes (supraclavicular and infraclavicular nodes and axillary also examined and noted if abn), chest exam included symmetry and effort and fremitus and percussion and auscultation, cardiac exam included rhythm and gallops and murmur and rubs and jvd and edema, abdominal exam for mass and hepatosplenomegaly and tenderness and hernias and bowel sounds, Musculoskeletal exam with muscle tone and posture and mobility/gait and  strength, and skin for rashes and cyanosis " "and pallor and turgor, extremity for clubbing.  Findings were normal except for pertinent findings listed below: M2; BS diminished bilaterally         Radiographs (ct chest and cxr) reviewed: results reviewed by direct vision    CT chest 02/15/2022   1. Pulmonary emphysema and coronary artery disease. 6 cm bleb at the right lung apex.  2. Left pulmonary nodule. In a low risk patient, no further follow-up is recommended.  In a high-risk patient, 12 month follow-up CT could be considered.      CTA Chest Non-Coronary 05/04/2020   Limited CT pulmonary angiogram without evidence for definite filling defects within the adequately opacified portions of the pulmonary arteries.  Airspace consolidation in the right lung base and to a lesser extent in the left lung base may represent areas of pneumonia.  Thickening of the wall of the mid to distal esophagus.  This could be further assessed with upper endoscopy or an esophagram to evaluate the clinical significance of this finding.  Mildly prominent lymph nodes in the subcarinal region.  Diffuse fatty change throughout the liver.  Emphysematous changes in the lungs bilaterally.    X-Ray Chest AP Portable 05/04/2020   The heart size is within normal limits.  There are calcifications in the aortic arch.  There are linear foci of atelectasis or parenchymal scarring in the lung bases bilaterally.  There are no pleural effusions.  The osseous structures are intact.    CT Chest Without Contrast 12/20/2019   Emphysema.  Atherosclerotic calcification of the aorta, origins of the right and left subclavian arteries and coronary arteries.    CT Chest Without Contrast 07/09/2019  Viewed by direct vision, poor quality images 74 images total not a sufficient study.  The lungs are hyperexpanded with bullous emphysematous changes seen in the upper lobes.  The lower lobes demonstrate prominent interlobar septa.  There are few patchy" bud in tree "appearance seen in the left lower lobe.  There " are no pulmonary nodules or suspicious masses.  A small bilateral pleural effusion is present which could be physiologic.    The heart is normal in size however demonstrates coronary artery calcifications.  The tracheobronchial airway is slightly dilated.  There is shotty lymph nodes noted of the mediastinum.  Note is made of calcifications of the thoracic aorta.     XR CHEST 1 VIEW 03/20/2019   Negative chest x-ray     2D ECHO WITH COLOR FLOW DOPPLER 12/15/2017   3 - Normal left ventricular systolic function (EF 65-70%).     4 - Impaired LV relaxation, normal LAP (grade 1 diastolic dysfunction).       Labs reviewed       Lab Results   Component Value Date    WBC 12.89 (H) 12/24/2022    RBC 3.59 (L) 12/24/2022    HGB 11.8 (L) 12/24/2022    HCT 34.9 (L) 12/24/2022    MCV 97 12/24/2022    MCH 32.9 (H) 12/24/2022    MCHC 33.8 12/24/2022    RDW 12.8 12/24/2022     12/24/2022    MPV 10.4 12/24/2022    GRAN 11.4 (H) 12/24/2022    GRAN 88.3 (H) 12/24/2022    LYMPH 0.6 (L) 12/24/2022    LYMPH 4.3 (L) 12/24/2022    MONO 0.8 12/24/2022    MONO 6.2 12/24/2022    EOS 0.0 12/24/2022    BASO 0.03 12/24/2022    EOSINOPHIL 0.2 12/24/2022    BASOPHIL 0.2 12/24/2022       PFT results reviewed  Pulmonary Functions Testing Results:  PFT results reviewed fev1 50 % at 1.53 with no bd response 12/15/2017      Spirometry bronchodilator, lung volume by gas dilution, diffusion capacity measured July 9, 2019.  The FEV1 to FVC ratio was only 54%, this indicates airflow obstruction.  The FEV1 measured 59% predicted at 1.75 L.  The of 5% improvement in the FEV1   following bronchodilator was not statistically significant, 12% seem to for statistical significance.  Total lung capacity was normal.  Diffusion was low, uncorrected for anemia present, at 61%.     There is  severe airflow obstruction, no bronchodilator response, no restriction, and loss of diffusion measured.  Clinical correlation recommended.     6 min walk study was done  February 9, 2021. Baseline room air saturation was 96%. With ambulation O2 sat felt to 92% but no lower. Patient walked about 86% of the reference distance of 425 m.       EPWORTH SLEEPINESS SCALE SCORE 16 on 7/15/2020  Sleep study 8/4/2020= AHI 23.4    6 minute walk study was accomplished February 9, 2022. Baseline room air saturation was 91 percent. With ambulation O2 sat fell to 88 percent by 5 minutes. Patient need 2 liters of oxygen maintain O2 sat in the 90s. At the end of the walk on 2 liters of   oxygen O2 sat was 97 percent. Patient walked 70 percent of the reference distance at 340 meters.       Plan:  Clinical impression is resonably certain and repeated evaluation prn +/- follow up will be needed as below.    Brad was seen today for 6m f/u, medication refill and shortness of breath.    Diagnoses and all orders for this visit:    Chronic obstructive pulmonary disease, unspecified COPD type  -     Ambulatory referral/consult to Pulmonary Rehab; Future  -     CT Chest Without Contrast; Future  -     Complete PFT with bronchodilator; Future  -     budesonide-glycopyr-formoterol (BREZTRI AEROSPHERE) 160-9-4.8 mcg/actuation HFAA; Inhale 2 puffs into the lungs 2 (two) times a day.    Panlobular emphysema  Comments:  - repeat PFT and CT chest for [employer]ix caculation for [place] Valve  Orders:  -     Ambulatory referral/consult to Pulmonary Rehab; Future  -     CT Chest Without Contrast; Future  -     Complete PFT with bronchodilator; Future  -     budesonide-glycopyr-formoterol (BREZTRI AEROSPHERE) 160-9-4.8 mcg/actuation HFAA; Inhale 2 puffs into the lungs 2 (two) times a day.    Chronic respiratory failure with hypoxia  Comments:  - continue supplemental oxygen    Steroid-dependent chronic obstructive pulmonary disease  Comments:  - Pulmonary Rehab, in home if in clinic not available  - continue current prescription medication regiment    Obstructive sleep apnea syndrome  Comments:  - Continue CPAP  nightly            Follow up in about 6 months (around 4/8/2025), or if symptoms worsen or fail to improve.    Discussed with patient above for education the following:      Patient Instructions   Continue CPAP nightly and supplemental oxygen as needed.     Will repeat CT of chest and lung function test. I will place the information in a computer program and evaluate you for the [place] valve we discuss in clinic. If you qualify I will send referral to [facility] for further evaluation    Continue Breztri, nebulizer, and aerobika device.   Adding new nebulizer medication. Expect phone call from a specialty pharmacy out of state to fill.

## 2024-10-08 NOTE — PATIENT INSTRUCTIONS
Continue CPAP nightly and supplemental oxygen as needed.     Will repeat CT of chest and lung function test. I will place the information in a computer program and evaluate you for the [place] valve we discuss in clinic. If you qualify I will send referral to [facility] for further evaluation    Continue Breztri, nebulizer, and aerobika device.   Adding new nebulizer medication. Expect phone call from a specialty pharmacy out of state to fill.

## 2024-10-20 PROBLEM — N17.9 AKI (ACUTE KIDNEY INJURY): Status: RESOLVED | Noted: 2024-10-20 | Resolved: 2024-10-20

## 2024-10-20 PROBLEM — N17.9 AKI (ACUTE KIDNEY INJURY): Status: ACTIVE | Noted: 2024-10-20

## 2024-10-22 ENCOUNTER — PATIENT OUTREACH (OUTPATIENT)
Dept: ADMINISTRATIVE | Facility: CLINIC | Age: 74
End: 2024-10-22
Payer: MEDICARE

## 2024-10-22 NOTE — PROGRESS NOTES
C3 nurse spoke with Brad Nowak  for a TCC post hospital discharge follow up call. The patient has a scheduled HOSFU appointment with Jory Glez MD on 10/30/2024 @ patient scheduled appt [facility] , did not mention  the time..

## 2024-10-25 DIAGNOSIS — J44.9 STEROID-DEPENDENT CHRONIC OBSTRUCTIVE PULMONARY DISEASE: ICD-10-CM

## 2024-10-25 DIAGNOSIS — J43.9 PULMONARY EMPHYSEMA, UNSPECIFIED EMPHYSEMA TYPE: ICD-10-CM

## 2024-10-25 DIAGNOSIS — Z79.52 CURRENT CHRONIC USE OF SYSTEMIC STEROIDS: ICD-10-CM

## 2024-10-25 DIAGNOSIS — Z92.241 STEROID-DEPENDENT CHRONIC OBSTRUCTIVE PULMONARY DISEASE: ICD-10-CM

## 2024-10-25 DIAGNOSIS — I10 HYPERTENSION, UNSPECIFIED TYPE: ICD-10-CM

## 2024-10-25 RX ORDER — AMLODIPINE BESYLATE 10 MG/1
10 TABLET ORAL DAILY
Qty: 90 TABLET | Refills: 0 | Status: SHIPPED | OUTPATIENT
Start: 2024-10-25 | End: 2025-10-25

## 2024-10-25 RX ORDER — HYDRALAZINE HYDROCHLORIDE 25 MG/1
25 TABLET, FILM COATED ORAL 3 TIMES DAILY
Qty: 270 TABLET | Refills: 0 | Status: SHIPPED | OUTPATIENT
Start: 2024-10-25 | End: 2025-10-25

## 2024-10-25 RX ORDER — PREDNISONE 20 MG/1
TABLET ORAL
Qty: 21 TABLET | Refills: 2 | Status: SHIPPED | OUTPATIENT
Start: 2024-10-25

## 2025-01-02 ENCOUNTER — TELEPHONE (OUTPATIENT)
Dept: PULMONOLOGY | Facility: CLINIC | Age: 75
End: 2025-01-02
Payer: MEDICARE

## 2025-01-02 DIAGNOSIS — R91.1 LUNG NODULE: Primary | ICD-10-CM

## 2025-01-02 NOTE — TELEPHONE ENCOUNTER
Pt scheduled for tomorrow for ct scan     ----- Message from Nidia Guevara NP sent at 1/2/2025 10:32 AM CST -----  Please call and notify patient I ordered a CT of chest to evaluate the previously seen nodule from October. He did not have the blood test or PET scan I ordered last October. The CT can be done in its place.     Nidia Guevara  ----- Message -----  From: SYSTEM  Sent: 12/29/2024   1:00 AM CST  To: Nidia Guevara NP

## 2025-02-19 DIAGNOSIS — M25.512 LEFT SHOULDER PAIN: Primary | ICD-10-CM

## 2025-02-22 DIAGNOSIS — J96.11 CHRONIC RESPIRATORY FAILURE WITH HYPOXIA: ICD-10-CM

## 2025-02-22 DIAGNOSIS — Z92.241 STEROID-DEPENDENT CHRONIC OBSTRUCTIVE PULMONARY DISEASE: ICD-10-CM

## 2025-02-22 DIAGNOSIS — J47.9 BRONCHIECTASIS WITHOUT COMPLICATION: ICD-10-CM

## 2025-02-22 DIAGNOSIS — J43.9 PULMONARY EMPHYSEMA, UNSPECIFIED EMPHYSEMA TYPE: ICD-10-CM

## 2025-02-22 DIAGNOSIS — Z79.52 CURRENT CHRONIC USE OF SYSTEMIC STEROIDS: ICD-10-CM

## 2025-02-22 DIAGNOSIS — J44.9 STEROID-DEPENDENT CHRONIC OBSTRUCTIVE PULMONARY DISEASE: ICD-10-CM

## 2025-02-22 DIAGNOSIS — J44.9 STEROID-DEPENDENT COPD: ICD-10-CM

## 2025-02-22 DIAGNOSIS — Z92.241 STEROID-DEPENDENT COPD: ICD-10-CM

## 2025-02-24 RX ORDER — ALBUTEROL SULFATE 90 UG/1
2 INHALANT RESPIRATORY (INHALATION) EVERY 4 HOURS PRN
Qty: 18 G | Refills: 2 | Status: SHIPPED | OUTPATIENT
Start: 2025-02-24

## 2025-02-24 RX ORDER — PREDNISONE 20 MG/1
TABLET ORAL
Qty: 21 TABLET | Refills: 2 | Status: SHIPPED | OUTPATIENT
Start: 2025-02-24

## 2025-02-24 RX ORDER — IPRATROPIUM BROMIDE AND ALBUTEROL SULFATE 2.5; .5 MG/3ML; MG/3ML
3 SOLUTION RESPIRATORY (INHALATION) EVERY 6 HOURS PRN
Qty: 360 ML | Refills: 5 | Status: SHIPPED | OUTPATIENT
Start: 2025-02-24 | End: 2026-02-24

## 2025-02-24 RX ORDER — BUDESONIDE 0.5 MG/2ML
0.5 INHALANT ORAL 2 TIMES DAILY PRN
Qty: 120 ML | Refills: 1 | Status: SHIPPED | OUTPATIENT
Start: 2025-02-24

## 2025-03-21 DIAGNOSIS — Z92.241 STEROID-DEPENDENT COPD: ICD-10-CM

## 2025-03-21 DIAGNOSIS — J44.9 STEROID-DEPENDENT COPD: ICD-10-CM

## 2025-03-21 RX ORDER — PREDNISONE 5 MG/1
10 TABLET ORAL DAILY
Qty: 180 TABLET | Refills: 3 | Status: SHIPPED | OUTPATIENT
Start: 2025-03-21

## 2025-03-27 ENCOUNTER — TELEPHONE (OUTPATIENT)
Dept: ORTHOPEDICS | Facility: CLINIC | Age: 75
End: 2025-03-27
Payer: MEDICARE

## 2025-03-27 DIAGNOSIS — M25.512 LEFT SHOULDER PAIN, UNSPECIFIED CHRONICITY: Primary | ICD-10-CM

## 2025-03-27 NOTE — TELEPHONE ENCOUNTER
"----- Message from Kin sent at 3/27/2025  8:06 AM CDT -----  Regarding: call back  Consult/Advisory:  Name Of Caller: Self Contact Preference?:[phone] What is the nature of the call?: Calling to speak w/  someone in regards to getting an Xray done before his appt requesting call back  Additional Notes:"Thank you for all that you do for our patients"  "

## 2025-03-28 ENCOUNTER — OFFICE VISIT (OUTPATIENT)
Dept: ORTHOPEDICS | Facility: CLINIC | Age: 75
End: 2025-03-28
Payer: MEDICARE

## 2025-03-28 VITALS
HEIGHT: 67 IN | SYSTOLIC BLOOD PRESSURE: 166 MMHG | DIASTOLIC BLOOD PRESSURE: 68 MMHG | HEART RATE: 63 BPM | BODY MASS INDEX: 28.52 KG/M2 | WEIGHT: 181.69 LBS

## 2025-03-28 DIAGNOSIS — M25.512 CHRONIC LEFT SHOULDER PAIN: Chronic | ICD-10-CM

## 2025-03-28 DIAGNOSIS — M25.812 IMPINGEMENT OF LEFT SHOULDER: Primary | ICD-10-CM

## 2025-03-28 DIAGNOSIS — G89.29 CHRONIC LEFT SHOULDER PAIN: Chronic | ICD-10-CM

## 2025-03-28 PROCEDURE — 99999 PR PBB SHADOW E&M-EST. PATIENT-LVL V: CPT | Mod: PBBFAC,,,

## 2025-03-28 RX ORDER — TRIAMCINOLONE ACETONIDE 40 MG/ML
40 INJECTION, SUSPENSION INTRA-ARTICULAR; INTRAMUSCULAR
Status: DISCONTINUED | OUTPATIENT
Start: 2025-03-28 | End: 2025-03-28 | Stop reason: HOSPADM

## 2025-03-28 RX ORDER — TRIAMCINOLONE ACETONIDE 40 MG/ML
40 INJECTION, SUSPENSION INTRA-ARTICULAR; INTRAMUSCULAR
Status: COMPLETED | OUTPATIENT
Start: 2025-03-28 | End: 2025-03-28

## 2025-03-28 RX ADMIN — TRIAMCINOLONE ACETONIDE 40 MG: 40 INJECTION, SUSPENSION INTRA-ARTICULAR; INTRAMUSCULAR at 08:03

## 2025-03-28 NOTE — PROGRESS NOTES
Patient ID: Brad Nowak is a 75 y.o. male    CC: left shoulder pain    History of Present Illness:    Brad Nowak presents to clinic for left shoulder pain. RHD. Patient denies known CHARLY. The pain started 4 months ago and is becoming progressively worse.  Pain is located over (points to) anterior and lateral shoulder . He reports that the pain is a 0 /10 sharp pain today. He reports he only experiences pain when raising the arm. The pain is affecting ADLs and limiting desired level of activity. Denies numbness, tingling, radiation and inability to bear weight.  Pain is 10 /10 at its worst.     Trial of rest with no improvement. He is s/p [facility]. He has not tried injections or PT to his left shoulder.     Occupation: retired     Ambulating: unassisted   Diabetic: negative   Smoking: quit in    Hx of DVT/PE: negative     PAST MEDICAL HISTORY:   Past Medical History:   Diagnosis Date    COPD (chronic obstructive pulmonary disease)     Hyperlipidemia     Hypertension     Obstructive sleep apnea     Unspecified glaucoma      PAST SURGICAL HISTORY:   Past Surgical History:   Procedure Laterality Date    INGUINAL HERNIA REPAIR Left     SHOULDER ARTHROSCOPY Right     WRIST SURGERY Left      FAMILY HISTORY:   Family History   Problem Relation Name Age of Onset    Cancer Mother [place]     Asthma Mother Erma     Heart disease Father Opy     COPD Father Opy     Cancer Father Opy     Cancer Brother Best         colon cancer    No Known Problems Brother Jarad     No Known Problems Brother Catrachito      SOCIAL HISTORY:   Social History     Occupational History    Not on file   Tobacco Use    Smoking status: Former     Current packs/day: 0.00     Average packs/day: 2.0 packs/day for 40.0 years (80.0 ttl pk-yrs)     Types: Cigarettes     Start date: 3/24/1969     Quit date: 3/24/2009     Years since quittin.0    Smokeless tobacco: Never   Substance and Sexual Activity     Alcohol use: Yes     Comment: 6-12 beers/night; some days none.    Drug use: No    Sexual activity: Not on file        MEDICATIONS: Current Medications[1]  ALLERGIES: Review of patient's allergies indicates:  No Known Allergies      Physical Exam     Vitals:    03/28/25 0807   BP: (!) 166/68   Pulse: 63     Alert and oriented to person, place and time. No acute distress. Well-groomed, not ill appearing. Pupils round and reactive, normal respiratory effort, no audible wheezing.     Shoulder / Upper Extremity Exam    OBSERVATION:     Swelling  none  Deformity  none   Discoloration  none   Scapular winging none   Scars   none  Atrophy  none    TENDERNESS                Clavicle   Negative         AC Jt.    Negative        SC Jt.    Negative          Acromion:  Negative        Scapular Spine Negative   Supraspinatus  Negative       Infraspinatus  Negative   LH Biceps   Negative   Greater Tub.  Negative   Trapezius  Negative   Cervical spine  Negative        ROM: (* = with pain)              FE    90°  *with pain     ER at 0°    60°        ER at 90° ABD  90°  *with pain     IR at 90°  ABD   NA         IR (spine level)   T10         STRENGTH: (* = with pain)    SCAPTION   5/5 *with pain       IR    5/5       ER    5/5       BICEPS   5/5       Deltoid    5/5         SIGNS:  Painful side       NEER   pos    OTAMANNAS  neg    DAIGLE   pos   SPEEDS  neg     DROP ARM   neg   BELLY PRESS neg   Superior escape none    LIFT-OFF  neg   X-Body ADD    neg    MOVING VALGUS neg        STABILITY TESTING        Translation       Anterior  Normal      Posterior  Normal     Sulcus   < 10mm     Signs    Apprehension   neg   Relocation   no change        Jerk test  neg         Imaging:     Bilateral shoulder X-rays ordered/reviewed by me showing no evidence of fracture or dislocation. There is no obvious malalignment. No evidence of masses, lesions or foreign bodies. Mild glenohumeral DJD.    MRI left shoulder report only Reviewed  with the following findings:   Findings:  There is increased signal intensity in the supraspinatus, infraspinatus, and subscapularis tendons. No discrete tears are seen. The teres minor tendon is intact.  The glenoid labrum has grossly normal morphology and signal intensity. The long head of the biceps tendon and its anchor are intact.  Mild degenerative changes at the AC joint with inferior osteophytes. The acromion has a flattened configuration.  No fracture or dislocation.       Assessment & Plan    Left shoulder pain  -     Ambulatory referral/consult to Orthopedics         I made the decision to obtain old records of the patient including previous notes and imaging. New imaging was ordered today of the extremity or extremities evaluated. I independently reviewed and interpreted the radiographs and/or MRIs/CT scan today as well as prior imaging.    We discussed at length different treatment options including conservative vs surgical management. These include anti-inflammatories, acetaminophen, rest, ice, heat, formal physical therapy including strengthening and stretching exercises, home exercise programs, injections, dry needling, and finally surgical intervention.      Patient here for chronic left shoulder pain.  Discussed his MRI results which are fairly unremarkable.  He has not tried any injections or formal physical therapy.  He does need to work on range of motion.  Patient would like to proceed with a trial of subacromial corticosteroid injection and physical therapy.     Procedure: left shoulder CSI   Medication ordered: None today. Continue OTC tylenol/ibuprofen as needed.   Ice compress to the affected area 2-3x a day for 15-20 minutes as needed for pain management  Ambulatory referral: physical therapy   Consider MRI of left shoulder if pain is refractory to conservative treatment    Follow up: 8 weeks   X-rays next visit: none     All questions were answered and patient is agreeable to the above  plan.                    [1]   Current Outpatient Medications:     albuterol (VENTOLIN HFA) 90 mcg/actuation inhaler, Inhale 2 puffs into the lungs every 4 (four) hours as needed for Wheezing or Shortness of Breath. Rescue, Disp: 18 g, Rfl: 2    albuterol-ipratropium (DUO-NEB) 2.5 mg-0.5 mg/3 mL nebulizer solution, Take 3 mLs by nebulization every 6 (six) hours as needed for Wheezing or Shortness of Breath. Rescue, Disp: 360 mL, Rfl: 5    amLODIPine (NORVASC) 10 MG tablet, Take 1 tablet (10 mg total) by mouth once daily., Disp: 90 tablet, Rfl: 0    aspirin 81 MG Chew, Take 1 tablet (81 mg total) by mouth once daily., Disp: , Rfl:     atorvastatin (LIPITOR) 80 MG tablet, Take 80 mg by mouth., Disp: , Rfl:     budesonide (PULMICORT) 0.5 mg/2 mL nebulizer solution, Take 2 mLs (0.5 mg total) by nebulization 2 (two) times daily as needed (shortness of breath)., Disp: 120 mL, Rfl: 1    budesonide-glycopyr-formoterol (BREZTRI AEROSPHERE) 160-9-4.8 mcg/actuation HFAA, Inhale 2 puffs into the lungs 2 (two) times a day., Disp: 32.1 g, Rfl: 3    hydrALAZINE (APRESOLINE) 25 MG tablet, Take 1 tablet (25 mg total) by mouth 3 (three) times daily., Disp: 270 tablet, Rfl: 0    hydroCHLOROthiazide (HYDRODIURIL) 12.5 MG Tab, Take 1 tablet (12.5 mg total) by mouth once daily., Disp: 90 tablet, Rfl: 3    ibuprofen (ADVIL,MOTRIN) 800 MG tablet, Take 1 tablet (800 mg total) by mouth 3 (three) times daily., Disp: 90 tablet, Rfl: 1    magnesium oxide 500 mg Tab, Take tablet by mouth once daily., Disp: 90 each, Rfl: 0    mucus clearing device (ACAPELLA, FLUTTER), 1 Device by Misc.(Non-Drug; Combo Route) route 2 (two) times daily., Disp: 1 each, Rfl: 0    mucus clearing device (ACAPELLA, FLUTTER), 1 Device by Misc.(Non-Drug; Combo Route) route 2 (two) times daily., Disp: 1 each, Rfl: 0    predniSONE (DELTASONE) 10 MG tablet, , Disp: , Rfl:     predniSONE (DELTASONE) 20 MG tablet, TAKE 1 TABLET BY MOUTH 2 to 3 TIMES WEEKLY AS NEEDED, Disp: 21  tablet, Rfl: 2    predniSONE (DELTASONE) 5 MG tablet, Take 2 tablets (10 mg total) by mouth once daily., Disp: 180 tablet, Rfl: 3    tiZANidine (ZANAFLEX) 4 MG tablet, Take 1 tablet by mouth twice daily as needed., Disp: 180 tablet, Rfl: 0    losartan (COZAAR) 25 MG tablet, Take 1 tablet (25 mg total) by mouth once daily., Disp: 90 tablet, Rfl: 3

## 2025-03-28 NOTE — PROCEDURES
Large Joint Aspiration/Injection: L subacromial bursa    Date/Time: 3/28/2025 8:00 AM    Performed by: Tyra Maldonado PA-C  Authorized by: Tyra Maldonado PA-C    Consent Done?:  Yes (Verbal)  Indications:  Pain and arthritis  Site marked: the procedure site was marked    Timeout: prior to procedure the correct patient, procedure, and site was verified    Prep: patient was prepped and draped in usual sterile fashion    Local anesthesia used?: No    Local anesthetic:  Topical anesthetic and bupivacaine 0.25% without epinephrine  Anesthetic total (ml):  4      Details:  Needle Size:  21 G  Ultrasonic Guidance for needle placement?: No    Approach:  Posterior  Location:  Shoulder  Site:  L subacromial bursa  Medications:  40 mg triamcinolone acetonide 40 mg/mL  Patient tolerance:  Patient tolerated the procedure well with no immediate complications

## 2025-04-08 ENCOUNTER — OFFICE VISIT (OUTPATIENT)
Dept: PULMONOLOGY | Facility: CLINIC | Age: 75
End: 2025-04-08
Payer: MEDICARE

## 2025-04-08 VITALS
HEART RATE: 56 BPM | DIASTOLIC BLOOD PRESSURE: 67 MMHG | BODY MASS INDEX: 28.23 KG/M2 | HEIGHT: 67 IN | OXYGEN SATURATION: 99 % | SYSTOLIC BLOOD PRESSURE: 176 MMHG | WEIGHT: 179.88 LBS

## 2025-04-08 DIAGNOSIS — J18.9 COMMUNITY ACQUIRED PNEUMONIA, UNSPECIFIED LATERALITY: Primary | ICD-10-CM

## 2025-04-08 DIAGNOSIS — J44.9 STEROID-DEPENDENT COPD: ICD-10-CM

## 2025-04-08 DIAGNOSIS — J43.9 PULMONARY EMPHYSEMA, UNSPECIFIED EMPHYSEMA TYPE: ICD-10-CM

## 2025-04-08 DIAGNOSIS — J96.11 CHRONIC RESPIRATORY FAILURE WITH HYPOXIA: ICD-10-CM

## 2025-04-08 DIAGNOSIS — G47.33 OBSTRUCTIVE SLEEP APNEA SYNDROME: ICD-10-CM

## 2025-04-08 DIAGNOSIS — Z92.241 STEROID-DEPENDENT COPD: ICD-10-CM

## 2025-04-08 DIAGNOSIS — Z79.52 CURRENT CHRONIC USE OF SYSTEMIC STEROIDS: ICD-10-CM

## 2025-04-08 PROBLEM — J44.89 ASTHMA-COPD OVERLAP SYNDROME: Status: RESOLVED | Noted: 2018-12-20 | Resolved: 2025-04-08

## 2025-04-08 PROBLEM — J84.9 INTERSTITIAL PULMONARY DISEASE, UNSPECIFIED: Status: RESOLVED | Noted: 2020-08-27 | Resolved: 2025-04-08

## 2025-04-08 PROCEDURE — 99999 PR PBB SHADOW E&M-EST. PATIENT-LVL III: CPT | Mod: PBBFAC,,, | Performed by: NURSE PRACTITIONER

## 2025-04-08 RX ORDER — PREDNISONE 20 MG/1
TABLET ORAL
Qty: 21 TABLET | Refills: 2 | Status: SHIPPED | OUTPATIENT
Start: 2025-04-08

## 2025-04-08 NOTE — PROGRESS NOTES
4/8/2025    Brad Nowak  Office Note    Chief Complaint   Patient presents with    6m f/u       HPI:  4/8/2025- has started experimental treatment with [employer] [facility] in [place], [place]. Has not started study as of yet. On supplemental oxygen at 3L. States he get more improvement from nebulizer.   On CPAP nightly.   Currently on Breztri 2 puffs twice daily, Prednisone 10 mg daily with 20 mg one day weekly. Using nebulizer every 2 days.   Declined biologic therapy due to high co-pay, declined Ohtuvayre nebulized medication due to high co-pay. not interested in [place] valve surgery.   Did not hear from Pulmonary rehab to schedule.     10/8/2024- using flutter valve Aerobika with nebulizer 2 times weekly, complaint of wheeze, shortness of breath with exertion, and recurrent cough.   Non productive cough.   No longer able to cut grass due to shortness of breath. On supplemental oxygen at 3L. Tried biologic therapy with no benefit. Shortness of breath is worsening with time.   Inhibiting his ability to complete tasks inside and outside home.   On CPAP most nights. Causes dry mouth. Using heated humidity with minimal improvement.     Currently on breztri daily. On prednisone 10 mg daily. States mild improvement.     4/2/2024- currently on Breztri daily, using prednisone 20 mg three days weekly, preferred 10 mg daily.   Wearing CPAP as needed, had dry mouth from wearing, no interested in wearing.   Wearing supplemental oxygen at night at 3L and during the day as needed. Able to cut grass but has to take breaks and using albuterol as needed. Using albuterol on average 3 times weekly, no nocturnal coughing  Has occasional cough that is occasionally productive grey color.     Tried biologics in past but not interested in resuming.     8/10/2022- still trying to use CPAP, complaint of dry mouth nightly. Wearing oxygen at home, using [facility] for supplemental oxygen with problems with bills. Did not receive the heated  tubing ordered in May.   Cough and SOB- stable, most days, mild cough, SOB limits activity level, stays inside due to hot weather.     5/10/2022- has CPAP tried wearing but it wakes him up with dry mouth after 1 hour, is sleeping with oxygen only. Already has humidity.   Cough is resolved with aerobika and nebulizer treatments 2x daily, Helps him clear more mucous.    SOB severe complaint, difficult to walk out side of home with out stopping to rest. SOB after showing, improves with rest, has supplemental oxygen at 3L with benefit.   On prednisone 10 mg daily with 20 mg as needed.     2/9/22- received new CPAP following recall of original in past 2 weeks, wearing nightly with benefit; does not have supplemental oxygen.    SOB- worsening with time, difficult to do any exertion with out stopping to rest. Using albuterol inhaler 2x weekly with little benefit, severe complaint, worse in very cold weather. Difficult to leave home due to SOB.   Using nebulizer as needed. States nebulizer does not help with breathing.     No weightloss, chest tightness, wheeze or  night sweating, occasional cough non productive.       8/9/2021- fell at home fractured ribs 7/25/21. States pain is recurrent, worse with movement. Tx with pain medication, 10 on 0-10 scale with movement. Improves with pain medication with no current pain.  SOB- unchanged. Daily, worse after taking shower and cutting grass, improves with rest, able to do activities of daily living. On prednisone 10 mg daily. Taking 20 mg one day weekly. Associated with occasional non productive cough.  No chest tightness or wheezing. Has CPAP, not wearing due to CPAP causes dry mouth that is uncomfortable. States he breaths better with out it.     2/9/21- wearing CPAP nightly with some benefit. Did not receive the new mask as ordered. Mask is staying on all night. Waking up every night 1-2 x nightly around 1 or 2 pm.   SOB- unchanged, daily, can still perform tasks but has to  move slowly, currently busy doing home repairs. Currently on Prednisone 10 mg daily with 20 mg once a week.   No cough, chest tightness, or wheeze. Using nebulizer 2x daily.        11/9/2020- states did not receive new mask from [facility], states when he wakes up mask is not correct on face and air is leaking.   Wearing CPAP nightly, states feels about the same.   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 On 5 mg prednisone daily. Using Albuterol 20 mg 2-3 x weekly.   Cough, chest tightness, wheeze- unchanged, daily complaints.     10/15/2020- received CPAP machine from [facility] on Sept 17th, states wearing every night between 9:30- 5 am. States his daytime drowsiness has worsened since starting CPAP, still trying to adjust to wearing CPAP full face mask. States not comfortable. Has air leaking from above nose.   Returned oxygen concentrator to [facility] [place] due to it not working.   Cough- recurrent problem, unchanged, non productive, SOB- unchanged, using Trelegy daily, budesonide daily, albuterol daily, 5 mg prednisone. Has to stop outdoor chores due to SOB, no limits on indoor activities.       9/9/20- states oxygen concentrator is not working, has turned up to 5 L with very little air coming out of tubing. Titration study completed. Order sent for CPAP.   Wanting to change from [facility] to different [employer] company.     SOB- states felt better the morning after CPAP titration, worse with exertion. No improvement on supplemental oxygen, improves with rest.   Associated with chest tightness that improves with prednisone.   Using budesonide daily with only slight improvement in SOB  Cough- recurrent problem, non productive, mild,       8/27/2020- had sleep study, positive results and waiting for Titration study.  No change in fatigue, SOB, daily, chronic complaint, using prednisone as needed on average 2-3 days weekly. Takes for Chest tightness.   Using nebulizer Budesonide  2 times daily with minimal improvement.  No cough, wearing supplemental oxygen at night.     7/15/2020- shortness of breath worsening slowly with time, states harder to breath following pneumonia May 2020 tx in [facility]. SOB severe and hinders his activity level, wearing oxygen only at night 5L. States it does not help much.   Complaint fatigue, daytime drowsiness, sleeping time varies, feeling drowsy in mornings, sleeps alone.  Cough- recurrent problem, occasionally productive light yellow mucous cream colored, no nocturnal arousals, no chest tightness or wheeze.  I05 therapy did not improve breathing, not currently on daily prednisone, using nebulizer as needed 2-3x daily. Trelegy    4/15/2020- SOB- unchanged, daily, worse with exertion, limiting daily activities and slows him down, decreased Prednisone 20 mg once tp twice a week. Has supplemental oxygen at 5L at night.     December 20, 2019- received oxygen concentrator, states does not improve SOB during day, SOB- stable, daily problem, worse with exertion, improves with nebulized Albuterol. Increased Prednisone himself to 10 mg in am and 40 mg nightly every night for last 3 weeks.      September 20, 2019- SOB worsening, using albuterol nebulizer 1-2 x daily every day. Currently on Prednisone 10 mg daily, with 20mg taper when needed. States having to breath out of mouth to exhale. Stopped Dupixent due to lack of benefit.     June 20, 2019- Dupixent injections started Feb 2019, pt states no benefit, Breathing has worsened. Exercise tolerance has decreased. Currently taking 10 mg prednisone daily, taken prednisone 20 mg taper 5 x in 3 months.    03/20/2019-Breathing worsened in past month, on dupixent since January, Currently on Trelegy 1 puff daily and prednisone 10 mg daily every day.   No fever, no body aches, no pain, no wheeze, not able to do activities with out shortness of breath. Occasional non productive cough.     12/20/18- Breathing worsened,  SOB with any exertion, not taking advair due to cost.  On long term prednisone therapy for uncontrolled severe persistent asthma. Eosinophil count artificially low due steroid use.    11/9/18- ER at [facility] for chest pain, chest tightness, elevated pulse rate of 130, Discharged  Dx: COPD exacerbation, treated with albuterol nebulizer. States Advair cost to high, $183 month, States recently retired.   Using Duo neb 2x daily, 2-3 albuterol inhaler daily.    9/20/18- pt presents in clinic alone, states taking prednisone 10 mg daily and 20 mg daily twice a week when SOB, has taken 40 mg a day once last week. Uses rescue inhaler most days after climbing stairs at work. Has trouble sleeping. Discussed fasenra and pharmacy unable to contact patient. States he does not carry phone at work during day, does not answer numbers he does not know, and has no voicemail set up at moment. States using Advair Diskus daily. Cough every few days, non productive. Worse at night.      July 5, 2018- on prednisone 5/d with 20/d up to 3 weekly.  Was in hospital December.  Breathing worse, saw asbestos  and advised 2nd opinion, to see Dr Rodriguez soon.  No fasenra as pharmacy unable to contact pt. Pt ues prednisone 5/d and not using trelegy- has one puff left on 14 day sample given in April and relates trelegy not seems to help.  April 5 - increase prednisone from 5/d to 20/d since last yr - breathing still poor.  Rest good - but not better on prednisone 20/d.  No big improvement on incruse. Sinus better on flonase.     12/28/2017 pt very active, bikes to work with tool boxes with no problem. Had had hospital stays every 6 months til started prednisone continiously  5 yrs ago.  Pt recently flared with recent NSR stay. Pt had pft with fev1 50% at 1.53 liter 12/15/2017.  Very mahmood still.  On breo and singulair.  Use spiriva in past.     From consult last month:Plan:   Pt has eosinophillic asthma and copd.  Pt should respond to  singulair.  Higher dose steroid action plan needed.  He would likely do very well with il5 rx.     outpt soon on prednisone 60/d x 7 with taper, going to breo 200, and singulair would be good with current rx.  Would not strop prednisone.  outpt f/u recommended. I do no see asbestos complications.  History of Present Illness:  Cc is sob x 3 days.   Pt worked refinery as .  Stopped smoking yrs ago. FH + asthma with no prior h/o asthma.  Has had cough and wheezes and sob going back prior to [place].  Pt noted needed freq steroids so was kept on prednisone 5 mg daily with need to increase dose ever 2-3 months because of increase sob.  Pt uses breo 100 and xopenex works better than ventolin.  Pt has chr nasal drainage but no bad infections.  abx not used.  He denies spiriva helped in past and denies using daliresp.    Pt rides bike to work - bike weighs 300 lbs with tools for trade.  Pt very active and still works - lives with daughter.    Reviewed note    HPI:      8/21/2018 - Pt with known COPD, asthma (sees Dr Eddy) here for second opinion regarding asbestos lung disease.  Started working on [employer] in 1968.  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  He started working as a  in 1987 until present time and reports that he has had asbestos exposure.  He denies working directly with asbestos but did work in the vicinity.  He reports that Dr Eddy thinks he has asbestos issues.  Has had PFT but doesn't remember when.  He has chronic SOB, SHOEMAKER (being addressed by Dr Eddy)  He only uses prn inhalers.  Has some chronic cough and wheezing.  Feels that SOB is worse but he rides bike around at work (weights about 200#).        The chief compliant  problem varies with instablilty at time        PFSH:  Past Medical History:   Diagnosis Date    COPD (chronic obstructive  "pulmonary disease)     Hyperlipidemia     Hypertension     Obstructive sleep apnea     Unspecified glaucoma          Past Surgical History:   Procedure Laterality Date    INGUINAL HERNIA REPAIR Left     SHOULDER ARTHROSCOPY Right     WRIST SURGERY Left      Social History     Tobacco Use    Smoking status: Former     Current packs/day: 0.00     Average packs/day: 2.0 packs/day for 40.0 years (80.0 ttl pk-yrs)     Types: Cigarettes     Start date: 3/24/1969     Quit date: 3/24/2009     Years since quittin.0    Smokeless tobacco: Never   Substance Use Topics    Alcohol use: Yes     Comment: 6-12 beers/night; some days none.    Drug use: No     Family History   Problem Relation Name Age of Onset    Cancer Mother [place]     Asthma Mother [place]     Heart disease Father Opy     COPD Father Opy     Cancer Father Opy     Cancer Brother Best         colon cancer    No Known Problems Brother Jarad     No Known Problems Brother Catrachito      Review of patient's allergies indicates:  No Known Allergies  I have reviewed past medical, family, and social history. I have reviewed previous nurse notes.    Performance Status:The patient's activity level is household activities.       Review of Systems:  a review of eleven systems covering constitutional, Eye, HEENT, Psych, Respiratory, Cardiac, GI, , Musculoskeletal, Endocrine, Dermatologic was negative except for pertinent findings as listed ABOVE and below: pertinent positive as above, rest is good  Fatigue, shortness of breath,   cough     Exam:Comprehensive exam done. BP (!) 176/67 (BP Location: Right arm, Patient Position: Sitting)   Pulse (!) 56   Ht 5' 7" (1.702 m)   Wt 81.6 kg (179 lb 14.3 oz)   SpO2 99% Comment: on room air at rest  BMI 28.18 kg/m²   Exam included Vitals as listed, and patient's appearance and affect and alertness and mood, oral exam for yeast and hygiene and pharynx lesions and Mallapatti (M) score, neck with inspection for jvd and masses and " thyroid abnormalities and lymph nodes (supraclavicular and infraclavicular nodes and axillary also examined and noted if abn), chest exam included symmetry and effort and fremitus and percussion and auscultation, cardiac exam included rhythm and gallops and murmur and rubs and jvd and edema, abdominal exam for mass and hepatosplenomegaly and tenderness and hernias and bowel sounds, Musculoskeletal exam with muscle tone and posture and mobility/gait and  strength, and skin for rashes and cyanosis and pallor and turgor, extremity for clubbing.  Findings were normal except for pertinent findings listed below: M2; BS diminished bilaterally         Radiographs (ct chest and cxr) reviewed: results reviewed by direct vision  CT Chest Without Contrast 01/03/2025 Significant improvement of previously noted right lower lobe opacity    CT chest 02/15/2022   1. Pulmonary emphysema and coronary artery disease. 6 cm bleb at the right lung apex.  2. Left pulmonary nodule. In a low risk patient, no further follow-up is recommended.  In a high-risk patient, 12 month follow-up CT could be considered.      CTA Chest Non-Coronary 05/04/2020   Limited CT pulmonary angiogram without evidence for definite filling defects within the adequately opacified portions of the pulmonary arteries.  Airspace consolidation in the right lung base and to a lesser extent in the left lung base may represent areas of pneumonia.  Thickening of the wall of the mid to distal esophagus.  This could be further assessed with upper endoscopy or an esophagram to evaluate the clinical significance of this finding.  Mildly prominent lymph nodes in the subcarinal region.  Diffuse fatty change throughout the liver.  Emphysematous changes in the lungs bilaterally.    X-Ray Chest AP Portable 05/04/2020   The heart size is within normal limits.  There are calcifications in the aortic arch.  There are linear foci of atelectasis or parenchymal scarring in the lung  "bases bilaterally.  There are no pleural effusions.  The osseous structures are intact.    CT Chest Without Contrast 12/20/2019   Emphysema.  Atherosclerotic calcification of the aorta, origins of the right and left subclavian arteries and coronary arteries.    CT Chest Without Contrast 07/09/2019  Viewed by direct vision, poor quality images 74 images total not a sufficient study.  The lungs are hyperexpanded with bullous emphysematous changes seen in the upper lobes.  The lower lobes demonstrate prominent interlobar septa.  There are few patchy" bud in tree "appearance seen in the left lower lobe.  There are no pulmonary nodules or suspicious masses.  A small bilateral pleural effusion is present which could be physiologic.    The heart is normal in size however demonstrates coronary artery calcifications.  The tracheobronchial airway is slightly dilated.  There is shotty lymph nodes noted of the mediastinum.  Note is made of calcifications of the thoracic aorta.     XR CHEST 1 VIEW 03/20/2019   Negative chest x-ray     2D ECHO WITH COLOR FLOW DOPPLER 12/15/2017   3 - Normal left ventricular systolic function (EF 65-70%).     4 - Impaired LV relaxation, normal LAP (grade 1 diastolic dysfunction).       Labs reviewed       Lab Results   Component Value Date    WBC 8.60 10/20/2024    RBC 4.45 (L) 10/20/2024    HGB 14.4 10/20/2024    HCT 44.1 10/20/2024    MCV 99 (H) 10/20/2024    MCH 32.4 (H) 10/20/2024    MCHC 32.7 10/20/2024    RDW 12.5 10/20/2024     10/20/2024    MPV 10.5 10/20/2024    GRAN 6.1 10/20/2024    GRAN 70.4 10/20/2024    LYMPH 1.6 10/20/2024    LYMPH 18.5 10/20/2024    MONO 0.8 10/20/2024    MONO 9.8 10/20/2024    EOS 0.1 10/20/2024    BASO 0.03 10/20/2024    EOSINOPHIL 0.7 10/20/2024    BASOPHIL 0.3 10/20/2024       PFT results reviewed  Pulmonary Functions Testing Results:  PFT results reviewed fev1 50 % at 1.53 with no bd response 12/15/2017      Spirometry bronchodilator, lung volume by gas " dilution, diffusion capacity measured July 9, 2019.  The FEV1 to FVC ratio was only 54%, this indicates airflow obstruction.  The FEV1 measured 59% predicted at 1.75 L.  The of 5% improvement in the FEV1   following bronchodilator was not statistically significant, 12% seem to for statistical significance.  Total lung capacity was normal.  Diffusion was low, uncorrected for anemia present, at 61%.     There is  severe airflow obstruction, no bronchodilator response, no restriction, and loss of diffusion measured.  Clinical correlation recommended.     6 min walk study was done February 9, 2021. Baseline room air saturation was 96%. With ambulation O2 sat felt to 92% but no lower. Patient walked about 86% of the reference distance of 425 m.       EPWORTH SLEEPINESS SCALE SCORE 16 on 7/15/2020  Sleep study 8/4/2020= AHI 23.4    6 minute walk study was accomplished February 9, 2022. Baseline room air saturation was 91 percent. With ambulation O2 sat fell to 88 percent by 5 minutes. Patient need 2 liters of oxygen maintain O2 sat in the 90s. At the end of the walk on 2 liters of   oxygen O2 sat was 97 percent. Patient walked 70 percent of the reference distance at 340 meters.       Plan:  Clinical impression is resonably certain and repeated evaluation prn +/- follow up will be needed as below.    Brad was seen today for 6m f/u.    Diagnoses and all orders for this visit:    Community acquired pneumonia, unspecified laterality  Comments:  - CT reviewed  - Pneumonia resolved    Steroid-dependent COPD  -     predniSONE (DELTASONE) 20 MG tablet; TAKE 1 TABLET BY MOUTH 2 to 3 TIMES WEEKLY AS NEEDED    Chronic respiratory failure with hypoxia  -     HME - OTHER    Pulmonary emphysema, unspecified emphysema type  -     predniSONE (DELTASONE) 20 MG tablet; TAKE 1 TABLET BY MOUTH 2 to 3 TIMES WEEKLY AS NEEDED    Current chronic use of systemic steroids  -     predniSONE (DELTASONE) 20 MG tablet; TAKE 1 TABLET BY MOUTH 2 to 3  TIMES WEEKLY AS NEEDED    Obstructive sleep apnea syndrome  -     HME - OTHER              Follow up in about 6 months (around 10/8/2025), or if symptoms worsen or fail to improve.    Discussed with patient above for education the following:      There are no Patient Instructions on file for this visit.

## 2025-04-30 ENCOUNTER — CLINICAL SUPPORT (OUTPATIENT)
Dept: REHABILITATION | Facility: HOSPITAL | Age: 75
End: 2025-04-30
Payer: MEDICARE

## 2025-04-30 DIAGNOSIS — G89.29 CHRONIC LEFT SHOULDER PAIN: Chronic | ICD-10-CM

## 2025-04-30 DIAGNOSIS — M25.812 IMPINGEMENT OF LEFT SHOULDER: ICD-10-CM

## 2025-04-30 DIAGNOSIS — M25.512 CHRONIC LEFT SHOULDER PAIN: Chronic | ICD-10-CM

## 2025-04-30 DIAGNOSIS — M25.612 DECREASED ROM OF LEFT SHOULDER: Primary | ICD-10-CM

## 2025-04-30 PROCEDURE — 97110 THERAPEUTIC EXERCISES: CPT | Mod: PN

## 2025-04-30 PROCEDURE — 97162 PT EVAL MOD COMPLEX 30 MIN: CPT | Mod: PN

## 2025-05-01 NOTE — PROGRESS NOTES
Outpatient Rehab    Physical Therapy Evaluation (only)    Patient Name: Brad Nowak  MRN: [id number]  YOB: 1950  Encounter Date: 4/30/2025    Therapy Diagnosis:   Encounter Diagnoses   Name Primary?    Impingement of left shoulder     Chronic left shoulder pain     Decreased ROM of left shoulder Yes     Physician: Tyra Maldonado, *    Physician Orders: Eval and Treat  Medical Diagnosis: Impingement of left shoulder  Left shoulder pain    Visit # / Visits Authorized:  1 / 1  Insurance Authorization Period: 3/28/2025 to 3/28/2026  Date of Evaluation: 4/30/2025  Plan of Care Certification: 4/30/2025 to 7/27/25     Time In: 0800   Time Out: 0900  Total Time: 60   Total Billable Time: 60    Intake Outcome Measure for FOTO Survey    Therapist reviewed FOTO scores for Brad Nowak on 4/30/2025.   FOTO report - see Media section or FOTO account episode details.     Intake Score:  %         Subjective   History of Present Illness  Brad is a 75 y.o. male who reports to physical therapy with a chief concern of left shoulder.     The patient reports a medical diagnosis of Impingement of left shoulder. The patient has experienced this issue since 10/30/24.   Diagnostic tests related to this condition: X-ray.   X-Ray Details: FINDINGS:  Osseous mineralization is preserved.  No acute displaced fractures.  No suspicious lytic or blastic lesions.  Mild bilateral glenohumeral osteoarthritis.  AC joints are congruent.  No subluxation or dislocation.  Degenerative changes of the spine.  Visualized lungs are clear.     Impression:     Mild bilateral glenohumeral osteoarthritis.    History of Present Condition/Illness: Insidious onset 6 months prior to today's evaluation. Patient reports a decrease in shoulder pain and improved range of motion with injection.     Pain     Patient reports a current pain level of 0/10. Pain at best is reported as 0/10. Pain at worst is reported as 0/10.   Clinical Progression  (since onset): Stable  Pain-Relieving Factors: Medications - prescription, Other (Comment)  Other Pain-Relieving Factors: Injection  Pain-Aggravating Factors: Lifting         Living Arrangements  Living Situation  Housing: Home independently  Living Arrangements: Alone  Support Systems: Family members        Employment  Employment Status: Retired         Past Medical History/Physical Systems Review:   Brad Nowak  has a past medical history of COPD (chronic obstructive pulmonary disease), Hyperlipidemia, Hypertension, Obstructive sleep apnea, and Unspecified glaucoma.    Brad Nowak  has a past surgical history that includes Wrist surgery (Left); Inguinal hernia repair (Left); and Shoulder arthroscopy (Right).    Brad has a current medication list which includes the following prescription(s): albuterol, albuterol-ipratropium, amlodipine, aspirin, atorvastatin, budesonide, breztri aerosphere, hydralazine, hydrochlorothiazide, losartan, magnesium oxide, mucus clearing device, mucus clearing device, prednisone, prednisone, and tizanidine.    Review of patient's allergies indicates:  No Known Allergies     Objective      Shoulder Range of Motion  Right Shoulder   Active (deg) Passive (deg) Pain   Flexion 175       Extension 65       Scaption 170       ABduction 175       ADduction         Horizontal ABduction         Horizontal ADduction         External Rotation (Shoulder ABducted 0 degrees) 70       External Rotation (Shoulder ABducted 45 degrees)         External Rotation (Shoulder ABducted 90 degrees)         Internal Rotation (Shoulder ABducted 0 degrees) 60       Internal Rotation (Shoulder ABducted 45 degrees)         Internal Rotation (Shoulder ABducted 90 degrees)           Left Shoulder   Active (deg) Passive (deg) Pain   Flexion 150       Extension 65       Scaption 145       ABduction 150       ADduction         Horizontal ABduction         Horizontal ADduction         External Rotation  (Shoulder ABducted 0 degrees) 50       External Rotation (Shoulder ABducted 45 degrees)         External Rotation (Shoulder ABducted 90 degrees)         Internal Rotation (Shoulder ABducted 0 degrees) 50       Internal Rotation (Shoulder ABducted 45 degrees)         Internal Rotation (Shoulder ABducted 90 degrees)                       Shoulder Strength - Planes of Motion   Right Strength Right Pain Left Strength Left  Pain   Flexion 4+   4+     Extension 4+   4+     ABduction 4+   4+     ADduction 4+   4+     Horizontal ABduction 4+   4+     Horizontal ADduction 4+   4+     Internal Rotation 0° 4+   4+     Internal Rotation 90° 4+   4+     External Rotation 0° 4+   4+     External Rotation 90° 4+   4+           Right  Strength  Right Hand Dynamometer Position: 2  Elbow Position Forearm Position Trial 1 (lbs) Trial 2  (lbs) Trial 3  (lbs) Average  (lbs) Pain   Flexed Neutral 80 80             Left  Strength  Left Hand Dynamometer Position: 2  Elbow Position Forearm Position Trial 1 (lbs) Trial 2 (lbs) Trial 3 (lbs) Average (lbs) Pain   Flexed Neutral 60 60                    Shoulder Special Tests  Impingement Tests  Positive: Left Infraspinatus Muscle  Negative: Right Infraspinatus Muscle             Time Entry(in minutes):  PT Evaluation (Moderate) Time Entry: 50  Therapeutic Exercise Time Entry: 10    Assessment & Plan   Assessment  Brad presents with a condition of Low complexity.   Presentation of Symptoms: Stable  Will Comorbidities Impact Care: No       Functional Limitations: Activity tolerance, Completing self-care activities, Proprioception, Range of motion, Participating in leisure activities, Pain with ADLs/IADLs, Gross motor coordination  Impairments: Pain with functional activity, Impaired physical strength  Personal Factors Affecting Prognosis: Pain    Prognosis: Good  Assessment Details: Patient currently presents to therapy with a reduction in pain however displays a decrease in shoulder  range of motion and periscapular strength. Patient demonstrates deficits with range of motion, strength, and function that limit ability to participate in school, work, and recreational activities. They would benefit from skilled PT services to normalize kinetic chain mobility, strength, and function to safely return to their prior level of activity.    Plan  From a physical therapy perspective, the patient would benefit from: Skilled Rehab Services    Planned therapy interventions include: Therapeutic exercise, Therapeutic activities, Neuromuscular re-education, Manual therapy, ADLs/IADLs, Other (Comment), and Gait training. Dry Needling (prn)  Planned modalities to include: Biofeedback, Electrical stimulation - attended, Electrical stimulation - passive/unattended, Thermotherapy (hot pack), and Cryotherapy (cold pack).        Visit Frequency: 2 times Per Week for 8 Weeks.       This plan was discussed with Patient.   Discussion participants: Agreed Upon Plan of Care  Plan details: Frequency and duration of treatment to be adjusted as needed          Patient's spiritual, cultural, and educational needs considered and patient agreeable to plan of care and goals.           Goals:   Active       LTG        Patient will improve shoulder flexion to >/= to 160 degrees to improve overhead movement.        Start:  05/01/25    Expected End:  07/24/25            Patient will improve shoulder abduction  to >/= to 160 degrees to improve overhead movement.        Start:  05/01/25    Expected End:  07/24/25                Obdulio Paredes PT

## 2025-05-08 ENCOUNTER — CLINICAL SUPPORT (OUTPATIENT)
Dept: REHABILITATION | Facility: HOSPITAL | Age: 75
End: 2025-05-08
Payer: MEDICARE

## 2025-05-08 DIAGNOSIS — M25.612 DECREASED ROM OF LEFT SHOULDER: Primary | ICD-10-CM

## 2025-05-08 PROCEDURE — 97110 THERAPEUTIC EXERCISES: CPT | Mod: PN,CQ

## 2025-05-08 NOTE — PROGRESS NOTES
Outpatient Rehab    Physical Therapy Visit    Patient Name: Brad Nowak  MRN: [id number]  YOB: 1950  Encounter Date: 5/8/2025    Therapy Diagnosis:   Encounter Diagnosis   Name Primary?    Decreased ROM of left shoulder Yes     Physician: Tyra Maldonado, *    Physician Orders: Eval and Treat  Medical Diagnosis: Left shoulder pain  Impingement of left shoulder    Visit # / Visits Authorized:  1 / 10  Insurance Authorization Period: 4/30/2025 to 7/10/2025  Date of Evaluation: 4/30/2025  Plan of Care Certification: 4/30/2025 to 7/27/25      PT/PTA: PTA   Number of PTA visits since last PT visit:1  Time In: 0900   Time Out: 1000  Total Time (in minutes): 60   Total Billable Time (in minutes): 60      Precautions     COPD      Subjective   SH feels great since shot.  Pain reported as 0/10.      Objective            Treatment:  Therapeutic Exercise  TE 1: UBE 5'/5'  TE 2: Wall slides #1 3x10 flex/abd  TE 3: GTB ER/IR 3x10  TE 4: Rows GTB 3x10  TE 5: SH ext  3x10  TE 6: Pulleys 5' flex / 5' abd      Time Entry(in minutes):  Therapeutic Exercise Time Entry: 60    Assessment & Plan   Assessment: No adverse effects w/ exercises. Pt  concerned about price of co-pay and stated he might not be able to attend al sesssions. PTA provided pt w/ theraband and instructions to perform exercises at home if he chooses not to attend upcoming sessions. Brad will continue to benefit from skilled therapy.  Evaluation/Treatment Tolerance: Patient tolerated treatment well    Patient will continue to benefit from skilled outpatient physical therapy to address the deficits listed in the problem list box on initial evaluation, provide pt/family education and to maximize pt's level of independence in the home and community environment.     Patient's spiritual, cultural, and educational needs considered and patient agreeable to plan of care and goals.           Plan: COntinue POC as outlined in [place]    Goals:   Active        LTG        Patient will improve shoulder flexion to >/= to 160 degrees to improve overhead movement.  (Progressing)       Start:  05/01/25    Expected End:  07/24/25            Patient will improve shoulder abduction  to >/= to 160 degrees to improve overhead movement.  (Progressing)       Start:  05/01/25    Expected End:  07/24/25                Stew Mckeon, PTA

## 2025-05-21 ENCOUNTER — CLINICAL SUPPORT (OUTPATIENT)
Dept: REHABILITATION | Facility: HOSPITAL | Age: 75
End: 2025-05-21
Payer: MEDICARE

## 2025-05-21 DIAGNOSIS — M25.612 DECREASED ROM OF LEFT SHOULDER: Primary | ICD-10-CM

## 2025-05-21 PROCEDURE — 97110 THERAPEUTIC EXERCISES: CPT | Mod: PN,CQ

## 2025-05-21 NOTE — PROGRESS NOTES
Outpatient Rehab    Physical Therapy Visit    Patient Name: Brad Nowak  MRN: [id number]  YOB: 1950  Encounter Date: 5/21/2025    Therapy Diagnosis:   Encounter Diagnosis   Name Primary?    Decreased ROM of left shoulder Yes     Physician: Tyra Maldonado, *    Physician Orders: Eval and Treat  Medical Diagnosis: Left shoulder pain  Impingement of left shoulder    Visit # / Visits Authorized:  2 / 10  Insurance Authorization Period: 4/30/2025 to 7/10/2025  Date of Evaluation: 3/28/2025  Plan of Care Certification:       PT/PTA: PTA   Number of PTA visits since last PT visit:2  Time In: 0900   Time Out: 1000  Total Time (in minutes): 60   Total Billable Time (in minutes): 60      Precautions:       Subjective   I feel great, no pain today. I am a little stopped up from the humidity..  Pain reported as 0/10.      Objective            Treatment:  Therapeutic Exercise  TE 1: UBE 5'/5'  TE 2: Wall slides #1 3x10 flex/abd  TE 3: GTB ER/IR 3x10  TE 4: Rows GTB 3x10  TE 6: Pulleys 5' flex / 5' abd      Time Entry(in minutes):  Therapeutic Exercise Time Entry: 60    Assessment & Plan   Assessment: Pt stated that ABD wall slides affected his COPD, pt encouraged to take breaks along w/ breathig exercises. VC required to properly perform pulleys and ER w/ TB. Stated that he will be seeing referal provider on Friday 5/23/2025. Brad will continue to benefit from skilled therapy to address deficits.  Evaluation/Treatment Tolerance: Patient tolerated treatment well    Patient will continue to benefit from skilled outpatient physical therapy to address the deficits listed in the problem list box on initial evaluation, provide pt/family education and to maximize pt's level of independence in the home and community environment.     Patient's spiritual, cultural, and educational needs considered and patient agreeable to plan of care and goals.           Plan: COntinue POC as outlined in Eval    Goals:   Active        LTG        Patient will improve shoulder flexion to >/= to 160 degrees to improve overhead movement.  (Progressing)       Start:  05/01/25    Expected End:  07/24/25            Patient will improve shoulder abduction  to >/= to 160 degrees to improve overhead movement.  (Progressing)       Start:  05/01/25    Expected End:  07/24/25                Stew Mckeon, PTA

## 2025-05-28 ENCOUNTER — CLINICAL SUPPORT (OUTPATIENT)
Dept: REHABILITATION | Facility: HOSPITAL | Age: 75
End: 2025-05-28
Payer: MEDICARE

## 2025-05-28 DIAGNOSIS — M25.612 DECREASED ROM OF LEFT SHOULDER: Primary | ICD-10-CM

## 2025-05-28 PROCEDURE — 97110 THERAPEUTIC EXERCISES: CPT | Mod: PN

## 2025-05-28 NOTE — PROGRESS NOTES
Outpatient Rehab    Physical Therapy Discharge    Patient Name: Brad Nowak  MRN: [id number]  YOB: 1950  Encounter Date: 5/28/2025    Therapy Diagnosis:   Encounter Diagnosis   Name Primary?    Decreased ROM of left shoulder Yes     Physician: Tyra Maldonado, *    Physician Orders: Eval and Treat  Medical Diagnosis: Left shoulder pain  Impingement of left shoulder    Visit # / Visits Authorized:  3 / 10  Insurance Authorization Period: 4/30/2025 to 7/10/2025  Date of Evaluation: 3/28/2025  Plan of Care Certification:       PT/PTA: PT   Number of PTA visits since last PT visit:0  Time In: 0830   Time Out: 0900  Total Time (in minutes): 30   Total Billable Time (in minutes): 30    FOTO:  Intake Score:  %  Survey Score 2:  %  Survey Score 3:  %    Precautions:       Subjective   NO pain.  Pain reported as 0/10.      Objective      Shoulder Range of Motion  Left Shoulder   Active (deg) Passive (deg) Pain   Flexion 170       Extension 65       Scaption 160       ABduction 160       ADduction         Horizontal ABduction         Horizontal ADduction         External Rotation (Shoulder ABducted 0 degrees) 60       External Rotation (Shoulder ABducted 45 degrees)         External Rotation (Shoulder ABducted 90 degrees)         Internal Rotation (Shoulder ABducted 0 degrees) 60       Internal Rotation (Shoulder ABducted 45 degrees)         Internal Rotation (Shoulder ABducted 90 degrees)                       Shoulder Strength - Planes of Motion   Right Strength Right Pain Left Strength Left  Pain   Flexion 4+   4+     Extension 4+   4+     ABduction 4+   4+     ADduction 4+   4+     Horizontal ABduction           Horizontal ADduction           Internal Rotation 0° 4+   4+     Internal Rotation 90°           External Rotation 0° 4+   4+     External Rotation 90°                            Treatment:  Therapeutic Exercise  TE 1: UBE 5'/5'  TE 2: Wall slides #1 3x10 flex/abd  TE 3: GTB ER/IR 3x10  TE 4:  Rows GTB 3x10      Time Entry(in minutes):  Therapeutic Exercise Time Entry: 30    Assessment & Plan   Assessment: Pt currently presents with no ROM and LUE strength limitations and reports the ability to perform activity without an increase in pain. Pt will be discharged from therapy services.  Evaluation/Treatment Tolerance: Patient tolerated treatment well    The patient's spiritual, cultural, and educational needs were considered, and the patient is agreeable to the plan of care and goals.           Plan: COntinue POC as outlined in Eval    Goals:   Active       LTG        Patient will improve shoulder flexion to >/= to 160 degrees to improve overhead movement.  (Adequate for Care Transition)       Start:  05/01/25    Expected End:  07/24/25            Patient will improve shoulder abduction  to >/= to 160 degrees to improve overhead movement.  (Adequate for Care Transition)       Start:  05/01/25    Expected End:  07/24/25                Obdulio Paredes, PT

## 2025-06-03 ENCOUNTER — OFFICE VISIT (OUTPATIENT)
Dept: ORTHOPEDICS | Facility: CLINIC | Age: 75
End: 2025-06-03
Payer: MEDICARE

## 2025-06-03 VITALS
WEIGHT: 179.88 LBS | HEART RATE: 73 BPM | DIASTOLIC BLOOD PRESSURE: 74 MMHG | SYSTOLIC BLOOD PRESSURE: 168 MMHG | HEIGHT: 66 IN | BODY MASS INDEX: 28.91 KG/M2

## 2025-06-03 DIAGNOSIS — M25.512 CHRONIC LEFT SHOULDER PAIN: Primary | ICD-10-CM

## 2025-06-03 DIAGNOSIS — G89.29 CHRONIC LEFT SHOULDER PAIN: Primary | ICD-10-CM

## 2025-06-03 DIAGNOSIS — M25.812 IMPINGEMENT OF LEFT SHOULDER: ICD-10-CM

## 2025-06-03 PROCEDURE — 99213 OFFICE O/P EST LOW 20 MIN: CPT | Mod: S$GLB,,,

## 2025-06-03 PROCEDURE — 99999 PR PBB SHADOW E&M-EST. PATIENT-LVL IV: CPT | Mod: PBBFAC,,,

## 2025-06-03 PROCEDURE — 3288F FALL RISK ASSESSMENT DOCD: CPT | Mod: CPTII,S$GLB,,

## 2025-06-03 PROCEDURE — 3077F SYST BP >= 140 MM HG: CPT | Mod: CPTII,S$GLB,,

## 2025-06-03 PROCEDURE — 1126F AMNT PAIN NOTED NONE PRSNT: CPT | Mod: CPTII,S$GLB,,

## 2025-06-03 PROCEDURE — 4010F ACE/ARB THERAPY RXD/TAKEN: CPT | Mod: CPTII,S$GLB,,

## 2025-06-03 PROCEDURE — 3008F BODY MASS INDEX DOCD: CPT | Mod: CPTII,S$GLB,,

## 2025-06-03 PROCEDURE — 1159F MED LIST DOCD IN RCRD: CPT | Mod: CPTII,S$GLB,,

## 2025-06-03 PROCEDURE — 1101F PT FALLS ASSESS-DOCD LE1/YR: CPT | Mod: CPTII,S$GLB,,

## 2025-06-03 PROCEDURE — 3078F DIAST BP <80 MM HG: CPT | Mod: CPTII,S$GLB,,

## 2025-06-03 PROCEDURE — 1160F RVW MEDS BY RX/DR IN RCRD: CPT | Mod: CPTII,S$GLB,,

## 2025-07-02 DIAGNOSIS — I73.9 PERIPHERAL ARTERY DISEASE: Primary | ICD-10-CM

## 2025-07-02 DIAGNOSIS — I27.20 PULMONARY HYPERTENSION: Primary | ICD-10-CM

## 2025-07-07 ENCOUNTER — TELEPHONE (OUTPATIENT)
Dept: VASCULAR SURGERY | Facility: CLINIC | Age: 75
End: 2025-07-07
Payer: MEDICARE

## 2025-07-07 DIAGNOSIS — I73.9 PAD (PERIPHERAL ARTERY DISEASE): Primary | ICD-10-CM

## 2025-07-07 NOTE — TELEPHONE ENCOUNTER
Spoke to pt. Advised he will need to have ABIs done prior to appt with Dr. Keller on  7/9/25. ABIs scheduled on 7/9/25 at 2:45 pm. Patient verbalized understanding and had no further questions.

## 2025-07-09 ENCOUNTER — HOSPITAL ENCOUNTER (OUTPATIENT)
Dept: VASCULAR SURGERY | Facility: CLINIC | Age: 75
Discharge: HOME | End: 2025-07-09
Attending: SURGERY
Payer: MEDICARE

## 2025-07-09 ENCOUNTER — INITIAL CONSULT (OUTPATIENT)
Dept: VASCULAR SURGERY | Facility: CLINIC | Age: 75
End: 2025-07-09
Payer: MEDICARE

## 2025-07-09 VITALS — BODY MASS INDEX: 28.75 KG/M2 | WEIGHT: 178.13 LBS | HEART RATE: 79 BPM

## 2025-07-09 DIAGNOSIS — I73.9 PERIPHERAL ARTERY DISEASE: ICD-10-CM

## 2025-07-09 DIAGNOSIS — I73.9 PAD (PERIPHERAL ARTERY DISEASE): ICD-10-CM

## 2025-07-09 PROCEDURE — 99999 PR PBB SHADOW E&M-EST. PATIENT-LVL II: CPT | Mod: PBBFAC,,, | Performed by: SURGERY

## 2025-07-09 PROCEDURE — 93923 UPR/LXTR ART STDY 3+ LVLS: CPT | Mod: S$GLB,,, | Performed by: SURGERY

## 2025-07-09 NOTE — PROGRESS NOTES
VASCULAR SURGERY CLINIC NOTE    Patient ID: Brad Nowak is a 75 y.o. male.    I. HISTORY     Chief Complaint: bilateral leg swelling    HPI: Brad Nowak is a 75 y.o. male who is here today for evaluation of bilateral leg swelling. Pt reports his feet are swollen all the time; he states his right leg is typically worse than the left. He denies any pain with walking. He states he has attempted to use knee-high compression stockings in the past, but had stopped. He denies having any cardiac or renal medical history.       Past Medical History:   Diagnosis Date    COPD (chronic obstructive pulmonary disease)     Hyperlipidemia     Hypertension     Obstructive sleep apnea     Unspecified glaucoma         Past Surgical History:   Procedure Laterality Date    INGUINAL HERNIA REPAIR Left     SHOULDER ARTHROSCOPY Right     WRIST SURGERY Left        Social History     Occupational History    Not on file   Tobacco Use    Smoking status: Former     Current packs/day: 0.00     Average packs/day: 2.0 packs/day for 40.0 years (80.0 ttl pk-yrs)     Types: Cigarettes     Start date: 3/24/1969     Quit date: 3/24/2009     Years since quittin.3    Smokeless tobacco: Never   Substance and Sexual Activity    Alcohol use: Yes     Comment: 6-12 beers/night; some days none.    Drug use: No    Sexual activity: Not on file       Current Medications[1]    Review of Systems   Constitutional: Negative.   Cardiovascular:  Positive for leg swelling. Negative for claudication.         II. PHYSICAL EXAM     Physical Exam  HENT:      Head: Normocephalic and atraumatic.   Eyes:      Extraocular Movements: Extraocular movements intact.   Cardiovascular:      Pulses: Normal pulses.      Comments: 2+ DP and PT pulses bilaterally  Pulmonary:      Effort: Pulmonary effort is normal.   Musculoskeletal:      Comments: Bilateral lower extremity swelling   Skin:     General: Skin is warm.   Neurological:      General: No focal deficit present.       Mental Status: He is alert.           Imaging Results:   VAS US Ankle Brachial Indices Resting 7/9/2025  Right Leg: Segmental pressures and PVR waveforms suggest minimal PAOD.   Right Lower Digits: Toe PPG waveforms described above. A TBI of 0.57 with a great toe pressure of 96 mmHg is noted.     Left Leg: Segmental pressures and PVR waveforms suggest minimal PAOD.   Lt lower digits: Toe PPG waveforms as described above. A TBI of 0.72 with a great toe pressure of 120 mmHg is noted.     III. ASSESSMENT & PLAN (MEDICAL DECISION MAKING)     1. Peripheral artery disease          Assessment/Diagnosis and Plan:  75 y.o. male here for evaluation of bilateral lower leg swelling. Pt ZYA's show good blood flow bilaterally to lower extremities and pt lower extremity pulse exam normal.     -Recommend compression with 20-30mmHg Rx stockings, elevation  -Exercise regularly  -RTC as needed for any new symptoms or concerns      Zoe Harmon, PGY-1  Vascular Surgery       [1]   Current Outpatient Medications:     albuterol (VENTOLIN HFA) 90 mcg/actuation inhaler, Inhale 2 puffs into the lungs every 4 (four) hours as needed for Wheezing or Shortness of Breath. Rescue, Disp: 18 g, Rfl: 2    albuterol-ipratropium (DUO-NEB) 2.5 mg-0.5 mg/3 mL nebulizer solution, Take 3 mLs by nebulization every 6 (six) hours as needed for Wheezing or Shortness of Breath. Rescue, Disp: 360 mL, Rfl: 5    amLODIPine (NORVASC) 10 MG tablet, Take 1 tablet (10 mg total) by mouth once daily., Disp: 90 tablet, Rfl: 0    aspirin 81 MG Chew, Take 1 tablet (81 mg total) by mouth once daily., Disp: , Rfl:     atorvastatin (LIPITOR) 80 MG tablet, Take 80 mg by mouth., Disp: , Rfl:     budesonide (PULMICORT) 0.5 mg/2 mL nebulizer solution, Take 2 mLs (0.5 mg total) by nebulization 2 (two) times daily as needed (shortness of breath)., Disp: 120 mL, Rfl: 1    budesonide-glycopyr-formoterol (BREZTRI AEROSPHERE) 160-9-4.8 mcg/actuation HFAA, Inhale 2 puffs into the  lungs 2 (two) times a day., Disp: 32.1 g, Rfl: 3    hydrALAZINE (APRESOLINE) 25 MG tablet, Take 1 tablet (25 mg total) by mouth 3 (three) times daily., Disp: 270 tablet, Rfl: 0    hydroCHLOROthiazide (HYDRODIURIL) 12.5 MG Tab, Take 1 tablet (12.5 mg total) by mouth once daily., Disp: 90 tablet, Rfl: 3    magnesium oxide 500 mg Tab, Take tablet by mouth once daily., Disp: 90 each, Rfl: 0    mucus clearing device (ACAPELLA, FLUTTER), 1 Device by Misc.(Non-Drug; Combo Route) route 2 (two) times daily., Disp: 1 each, Rfl: 0    mucus clearing device (ACAPELLA, FLUTTER), 1 Device by Misc.(Non-Drug; Combo Route) route 2 (two) times daily., Disp: 1 each, Rfl: 0    predniSONE (DELTASONE) 20 MG tablet, TAKE 1 TABLET BY MOUTH 2 to 3 TIMES WEEKLY AS NEEDED, Disp: 21 tablet, Rfl: 2    predniSONE (DELTASONE) 5 MG tablet, Take 2 tablets (10 mg total) by mouth once daily., Disp: 180 tablet, Rfl: 3    tiZANidine (ZANAFLEX) 4 MG tablet, Take 1 tablet by mouth twice daily as needed., Disp: 180 tablet, Rfl: 0    losartan (COZAAR) 25 MG tablet, Take 1 tablet (25 mg total) by mouth once daily., Disp: 90 tablet, Rfl: 3

## 2025-07-25 ENCOUNTER — OFFICE VISIT (OUTPATIENT)
Dept: CARDIOLOGY | Facility: CLINIC | Age: 75
End: 2025-07-25
Payer: MEDICARE

## 2025-07-25 VITALS
BODY MASS INDEX: 30.05 KG/M2 | WEIGHT: 186.19 LBS | SYSTOLIC BLOOD PRESSURE: 132 MMHG | OXYGEN SATURATION: 99 % | HEART RATE: 105 BPM | DIASTOLIC BLOOD PRESSURE: 60 MMHG

## 2025-07-25 DIAGNOSIS — I27.20 PULMONARY HYPERTENSION: ICD-10-CM

## 2025-07-25 DIAGNOSIS — R07.89 OTHER CHEST PAIN: ICD-10-CM

## 2025-07-25 DIAGNOSIS — I25.10 ATHEROSCLEROSIS OF NATIVE CORONARY ARTERY OF NATIVE HEART WITHOUT ANGINA PECTORIS: ICD-10-CM

## 2025-07-25 DIAGNOSIS — E78.5 HYPERLIPIDEMIA, UNSPECIFIED HYPERLIPIDEMIA TYPE: ICD-10-CM

## 2025-07-25 DIAGNOSIS — I45.10 RBBB: ICD-10-CM

## 2025-07-25 DIAGNOSIS — R94.31 ABNORMAL ELECTROCARDIOGRAM (ECG) (EKG): ICD-10-CM

## 2025-07-25 DIAGNOSIS — I10 HYPERTENSION, UNSPECIFIED TYPE: ICD-10-CM

## 2025-07-25 DIAGNOSIS — R06.09 DOE (DYSPNEA ON EXERTION): Primary | ICD-10-CM

## 2025-07-25 DIAGNOSIS — J96.11 CHRONIC RESPIRATORY FAILURE WITH HYPOXIA: ICD-10-CM

## 2025-07-25 PROCEDURE — 99999 PR PBB SHADOW E&M-EST. PATIENT-LVL III: CPT | Mod: PBBFAC,,, | Performed by: INTERNAL MEDICINE

## 2025-07-25 RX ORDER — FUROSEMIDE 20 MG/1
20 TABLET ORAL 2 TIMES DAILY
COMMUNITY
Start: 2025-06-27

## 2025-07-25 RX ORDER — POTASSIUM CHLORIDE 750 MG/1
10 TABLET, EXTENDED RELEASE ORAL 2 TIMES DAILY PRN
COMMUNITY
Start: 2025-05-09

## 2025-07-25 NOTE — PROGRESS NOTES
Cardiology    7/25/2025  8:15 AM    Problem list  Problem List[1]    History of Present Illness    Mr. Nowak presents today for follow up.  He reports significant improvement in LLE swelling with diuretic medication and stopping amlodipine 2 weeks ago. RLE swelling has shown minimal improvement with fluctuating symptoms. He was previously on amlodipine, which was discontinued and replaced with a diuretic. He has a long-standing history of COPD and emphysema with progressive worsening of dyspnea. He has sharp chest pain. He reports recent increase in respiratory symptom severity. Former smoker who quit in 2009.           Medications  Current Medications[2]   Prior to Admission medications    Medication Sig Start Date End Date Taking? Authorizing Provider   albuterol (VENTOLIN HFA) 90 mcg/actuation inhaler Inhale 2 puffs into the lungs every 4 (four) hours as needed for Wheezing or Shortness of Breath. Rescue 2/24/25  Yes Estelle Carrera NP   albuterol-ipratropium (DUO-NEB) 2.5 mg-0.5 mg/3 mL nebulizer solution Take 3 mLs by nebulization every 6 (six) hours as needed for Wheezing or Shortness of Breath. Rescue 2/24/25 2/24/26 Yes Estelle Carrera NP   aspirin 81 MG Chew Take 1 tablet (81 mg total) by mouth once daily. 1/22/16  Yes Betsy Velasco MD   atorvastatin (LIPITOR) 80 MG tablet Take 80 mg by mouth. 2/27/24  Yes Provider, Historical   budesonide (PULMICORT) 0.5 mg/2 mL nebulizer solution Take 2 mLs (0.5 mg total) by nebulization 2 (two) times daily as needed (shortness of breath). 2/24/25  Yes Estelle Carrera NP   budesonide-glycopyr-formoterol (BREZTRI AEROSPHERE) 160-9-4.8 mcg/actuation HFAA Inhale 2 puffs into the lungs 2 (two) times a day. 10/8/24  Yes Nidia Guevara NP   furosemide (LASIX) 20 MG tablet Take 20 mg by mouth 2 (two) times a day. 6/27/25  Yes Provider, Historical   hydrALAZINE (APRESOLINE) 25 MG tablet Take 1 tablet (25 mg total) by mouth 3 (three) times daily. 10/25/24 10/25/25 Yes  Reji Angulo MD   hydroCHLOROthiazide (HYDRODIURIL) 12.5 MG Tab Take 1 tablet (12.5 mg total) by mouth once daily. 10/20/24 10/20/25 Yes Tamiko Patel MD   magnesium oxide 500 mg Tab Take tablet by mouth once daily. 11/8/21  Yes    mucus clearing device (ACAPELLA, FLUTTER) 1 Device by Misc.(Non-Drug; Combo Route) route 2 (two) times daily. 4/2/24  Yes Nidia Guevara NP   mucus clearing device (ACAPELLA, FLUTTER) 1 Device by Misc.(Non-Drug; Combo Route) route 2 (two) times daily. 2/24/25  Yes Estelle Carrera NP   potassium chloride (KLOR-CON) 10 MEQ TbSR Take 10 mEq by mouth 2 (two) times daily as needed. 5/9/25  Yes Provider, Historical   predniSONE (DELTASONE) 20 MG tablet TAKE 1 TABLET BY MOUTH 2 to 3 TIMES WEEKLY AS NEEDED 4/8/25  Yes Nidia Guevara, NP   predniSONE (DELTASONE) 5 MG tablet Take 2 tablets (10 mg total) by mouth once daily. 3/21/25  Yes Nidia Guevara NP   tiZANidine (ZANAFLEX) 4 MG tablet Take 1 tablet by mouth twice daily as needed. 8/15/23  Yes    losartan (COZAAR) 25 MG tablet Take 1 tablet (25 mg total) by mouth once daily. 9/21/23 10/22/24  Reji Angulo MD   amLODIPine (NORVASC) 10 MG tablet Take 1 tablet (10 mg total) by mouth once daily. 10/25/24 7/25/25  Reji Angulo MD         History  Past Medical History:   Diagnosis Date    COPD (chronic obstructive pulmonary disease)     Hyperlipidemia     Hypertension     Obstructive sleep apnea     Unspecified glaucoma      Past Surgical History:   Procedure Laterality Date    INGUINAL HERNIA REPAIR Left     SHOULDER ARTHROSCOPY Right     WRIST SURGERY Left      Social History[3]      Allergies  Review of patient's allergies indicates:  No Known Allergies      Review of Systems   Review of Systems   Constitutional: Negative for decreased appetite, fever and weight loss.   HENT:  Negative for congestion and nosebleeds.    Eyes:  Negative for double vision, vision loss in left eye, vision loss in right eye and visual disturbance.    Cardiovascular:  Positive for chest pain. Negative for claudication, cyanosis, dyspnea on exertion, irregular heartbeat, leg swelling, near-syncope, orthopnea, palpitations, paroxysmal nocturnal dyspnea and syncope.   Respiratory:  Positive for shortness of breath. Negative for cough, hemoptysis, sleep disturbances due to breathing, snoring, sputum production and wheezing.    Endocrine: Negative for cold intolerance and heat intolerance.   Skin:  Negative for nail changes and rash.   Musculoskeletal:  Negative for joint pain, muscle cramps, muscle weakness and myalgias.   Gastrointestinal:  Negative for change in bowel habit, heartburn, hematemesis, hematochezia, hemorrhoids and melena.   Neurological:  Negative for dizziness, focal weakness and headaches.         Physical Exam  Wt Readings from Last 1 Encounters:   07/25/25 84.5 kg (186 lb 3.2 oz)     BP Readings from Last 3 Encounters:   07/25/25 132/60   06/03/25 (!) 168/74   04/08/25 (!) 176/67     Pulse Readings from Last 1 Encounters:   07/25/25 105     Body mass index is 30.05 kg/m².    Physical Exam  Vitals reviewed.   Constitutional:       Appearance: He is well-developed.   HENT:      Head: Atraumatic.   Eyes:      General: No scleral icterus.  Neck:      Vascular: Normal carotid pulses. No carotid bruit, hepatojugular reflux or JVD.   Cardiovascular:      Rate and Rhythm: Normal rate and regular rhythm.      Chest Wall: PMI is not displaced.      Pulses: Intact distal pulses.           Carotid pulses are 2+ on the right side and 2+ on the left side.       Radial pulses are 2+ on the right side and 2+ on the left side.        Dorsalis pedis pulses are 1+ on the right side and 1+ on the left side.      Heart sounds: Normal heart sounds, S1 normal and S2 normal. No murmur heard.     No friction rub.   Pulmonary:      Effort: Pulmonary effort is normal. No respiratory distress.      Breath sounds: Normal breath sounds. No stridor. No wheezing or rales.    Chest:      Chest wall: No tenderness.   Abdominal:      General: Bowel sounds are normal.      Palpations: Abdomen is soft.   Musculoskeletal:      Cervical back: Neck supple. No edema.   Skin:     General: Skin is warm and dry.      Nails: There is no clubbing.   Neurological:      Mental Status: He is alert and oriented to person, place, and time.   Psychiatric:         Behavior: Behavior normal.         Thought Content: Thought content normal.             Assessment  1. Pulmonary hypertension  unchanged  - Ambulatory referral/consult to Cardiology    2. SHOEMAKER (dyspnea on exertion) (Primary)  Being evaluated  - NM Multi Study RX Stress Card Component (BR); Future  - Nuclear Stress Test; Future    3. Atherosclerosis of native coronary artery of native heart without angina pectoris  Being evaluated  - EKG 12-lead    4. Hyperlipidemia, unspecified hyperlipidemia type  On atorvastatin 80mg    5. Hypertension, unspecified type  Controlled on meds    6. Chronic respiratory failure with hypoxia  unchanged    7. Other chest pain  Being evaluated  - NM Myocardial Perfusion Spect Multi Pharmacologic; Future  - NM Multi Study RX Stress Card Component (BR); Future  - Nuclear Stress Test; Future    8. Abnormal electrocardiogram (ECG) (EKG)  RBBB  - NM Myocardial Perfusion Spect Multi Pharmacologic; Future  - NM Multi Study RX Stress Card Component (BR); Future  - Nuclear Stress Test; Future        Plan and Discussion  Will get lexiscan to evaluate his CP, abn EKG.  Continue current meds.    Follow Up  1 month        Cresencio Zapata MD, F.A.C.C, F.S.C.A.I.      Total professional time spent for the encounter: 30 minutes  Time was spent preparing to see the patient, reviewing results of prior testing, obtaining and/or reviewing separately obtained history, performing a medically appropriate examination and interview, counseling and educating the patient/family, ordering medications/tests/procedures, referring and communicating  with other health care professionals, documenting clinical information in the electronic health record, and independently interpreting results.    This note was generated with the assistance of ambient listening technology. Verbal consent was obtained by the patient and accompanying visitor(s) for the recording of patient appointment to facilitate this note. I attest to having reviewed and edited the generated note for accuracy, though some syntax or spelling errors may persist. Please contact the author of this note for any clarification.           [1]   Patient Active Problem List  Diagnosis    Abdominal aortic atherosclerosis    History of prior cigarette smoking    Current chronic use of systemic steroids    Eosinophilic asthma    Chronic sinus complaints    Asbestos exposure    Aortic atherosclerosis    Hypertension    Hyperlipidemia    Community acquired pneumonia    Chronic respiratory failure with hypoxia    Sleep apnea    Mitral valve prolapse    SHOEMAKER (dyspnea on exertion)    Atherosclerosis of native coronary artery of native heart without angina pectoris    Edema    Steroid-dependent COPD    PAD (peripheral artery disease)   [2]   Current Outpatient Medications   Medication Sig Dispense Refill    albuterol (VENTOLIN HFA) 90 mcg/actuation inhaler Inhale 2 puffs into the lungs every 4 (four) hours as needed for Wheezing or Shortness of Breath. Rescue 18 g 2    albuterol-ipratropium (DUO-NEB) 2.5 mg-0.5 mg/3 mL nebulizer solution Take 3 mLs by nebulization every 6 (six) hours as needed for Wheezing or Shortness of Breath. Rescue 360 mL 5    aspirin 81 MG Chew Take 1 tablet (81 mg total) by mouth once daily.      atorvastatin (LIPITOR) 80 MG tablet Take 80 mg by mouth.      budesonide (PULMICORT) 0.5 mg/2 mL nebulizer solution Take 2 mLs (0.5 mg total) by nebulization 2 (two) times daily as needed (shortness of breath). 120 mL 1    budesonide-glycopyr-formoterol (BREZTRI AEROSPHERE) 160-9-4.8 mcg/actuation  HFAA Inhale 2 puffs into the lungs 2 (two) times a day. 32.1 g 3    furosemide (LASIX) 20 MG tablet Take 20 mg by mouth 2 (two) times a day.      hydrALAZINE (APRESOLINE) 25 MG tablet Take 1 tablet (25 mg total) by mouth 3 (three) times daily. 270 tablet 0    hydroCHLOROthiazide (HYDRODIURIL) 12.5 MG Tab Take 1 tablet (12.5 mg total) by mouth once daily. 90 tablet 3    magnesium oxide 500 mg Tab Take tablet by mouth once daily. 90 each 0    mucus clearing device (ACAPELLA, FLUTTER) 1 Device by Misc.(Non-Drug; Combo Route) route 2 (two) times daily. 1 each 0    mucus clearing device (ACAPELLA, FLUTTER) 1 Device by Misc.(Non-Drug; Combo Route) route 2 (two) times daily. 1 each 0    potassium chloride (KLOR-CON) 10 MEQ TbSR Take 10 mEq by mouth 2 (two) times daily as needed.      predniSONE (DELTASONE) 20 MG tablet TAKE 1 TABLET BY MOUTH 2 to 3 TIMES WEEKLY AS NEEDED 21 tablet 2    predniSONE (DELTASONE) 5 MG tablet Take 2 tablets (10 mg total) by mouth once daily. 180 tablet 3    tiZANidine (ZANAFLEX) 4 MG tablet Take 1 tablet by mouth twice daily as needed. 180 tablet 0    losartan (COZAAR) 25 MG tablet Take 1 tablet (25 mg total) by mouth once daily. 90 tablet 3     No current facility-administered medications for this visit.   [3]   Social History  Socioeconomic History    Marital status:    Tobacco Use    Smoking status: Former     Current packs/day: 0.00     Average packs/day: 2.0 packs/day for 40.0 years (80.0 ttl pk-yrs)     Types: Cigarettes     Start date: 3/24/1969     Quit date: 3/24/2009     Years since quittin.3    Smokeless tobacco: Never   Substance and Sexual Activity    Alcohol use: Yes     Comment: 6-12 beers/night; some days none.    Drug use: No     Social Drivers of Health     Financial Resource Strain: High Risk (2025)    Overall Financial Resource Strain (CARDIA)     Difficulty of Paying Living Expenses: Hard   Food Insecurity: Food Insecurity Present (2025)    Hunger  Vital Sign     Worried About Running Out of Food in the Last Year: Never true     Ran Out of Food in the Last Year: Sometimes true   Transportation Needs: No Transportation Needs (5/11/2025)    PRAPARE - Transportation     Lack of Transportation (Medical): No     Lack of Transportation (Non-Medical): No   Physical Activity: Inactive (5/11/2025)    Exercise Vital Sign     Days of Exercise per Week: 0 days     Minutes of Exercise per Session: 0 min   Stress: No Stress Concern Present (5/11/2025)    [place] [employer] [employer] - Occupational Stress Questionnaire     Feeling of Stress : Not at all   Housing Stability: Low Risk  (5/11/2025)    Housing Stability Vital Sign     Unable to Pay for Housing in the Last Year: No     Number of Times Moved in the Last Year: 1     Homeless in the Last Year: No

## 2025-07-31 ENCOUNTER — TELEPHONE (OUTPATIENT)
Dept: PULMONOLOGY | Facility: CLINIC | Age: 75
End: 2025-07-31
Payer: MEDICARE

## 2025-08-20 ENCOUNTER — OFFICE VISIT (OUTPATIENT)
Dept: CARDIOLOGY | Facility: CLINIC | Age: 75
End: 2025-08-20
Payer: MEDICARE

## 2025-08-20 VITALS
OXYGEN SATURATION: 99 % | WEIGHT: 179.88 LBS | HEART RATE: 71 BPM | DIASTOLIC BLOOD PRESSURE: 58 MMHG | BODY MASS INDEX: 29.04 KG/M2 | SYSTOLIC BLOOD PRESSURE: 120 MMHG

## 2025-08-20 DIAGNOSIS — I45.10 RBBB: ICD-10-CM

## 2025-08-20 DIAGNOSIS — I10 PRIMARY HYPERTENSION: Primary | ICD-10-CM

## 2025-08-20 DIAGNOSIS — I70.0 AORTIC ATHEROSCLEROSIS: ICD-10-CM

## 2025-08-20 PROCEDURE — 3078F DIAST BP <80 MM HG: CPT | Mod: CPTII,S$GLB,, | Performed by: INTERNAL MEDICINE

## 2025-08-20 PROCEDURE — 3074F SYST BP LT 130 MM HG: CPT | Mod: CPTII,S$GLB,, | Performed by: INTERNAL MEDICINE

## 2025-08-20 PROCEDURE — 4010F ACE/ARB THERAPY RXD/TAKEN: CPT | Mod: CPTII,S$GLB,, | Performed by: INTERNAL MEDICINE

## 2025-08-20 PROCEDURE — 99213 OFFICE O/P EST LOW 20 MIN: CPT | Mod: S$GLB,,, | Performed by: INTERNAL MEDICINE

## 2025-08-20 PROCEDURE — 1159F MED LIST DOCD IN RCRD: CPT | Mod: CPTII,S$GLB,, | Performed by: INTERNAL MEDICINE

## 2025-08-20 PROCEDURE — 99999 PR PBB SHADOW E&M-EST. PATIENT-LVL IV: CPT | Mod: PBBFAC,,, | Performed by: INTERNAL MEDICINE

## 2025-08-20 PROCEDURE — 3288F FALL RISK ASSESSMENT DOCD: CPT | Mod: CPTII,S$GLB,, | Performed by: INTERNAL MEDICINE

## 2025-08-20 PROCEDURE — 1101F PT FALLS ASSESS-DOCD LE1/YR: CPT | Mod: CPTII,S$GLB,, | Performed by: INTERNAL MEDICINE

## 2025-08-20 PROCEDURE — 3008F BODY MASS INDEX DOCD: CPT | Mod: CPTII,S$GLB,, | Performed by: INTERNAL MEDICINE

## 2025-08-20 PROCEDURE — 1126F AMNT PAIN NOTED NONE PRSNT: CPT | Mod: CPTII,S$GLB,, | Performed by: INTERNAL MEDICINE
